# Patient Record
Sex: FEMALE | Race: WHITE | NOT HISPANIC OR LATINO
[De-identification: names, ages, dates, MRNs, and addresses within clinical notes are randomized per-mention and may not be internally consistent; named-entity substitution may affect disease eponyms.]

---

## 2017-01-05 ENCOUNTER — APPOINTMENT (OUTPATIENT)
Dept: DERMATOLOGY | Facility: CLINIC | Age: 57
End: 2017-01-05

## 2017-02-02 ENCOUNTER — MED ADMIN CHARGE (OUTPATIENT)
Age: 57
End: 2017-02-02

## 2017-02-15 ENCOUNTER — APPOINTMENT (OUTPATIENT)
Dept: INTERNAL MEDICINE | Facility: CLINIC | Age: 57
End: 2017-02-15

## 2017-02-15 VITALS
OXYGEN SATURATION: 96 % | HEIGHT: 66 IN | HEART RATE: 71 BPM | DIASTOLIC BLOOD PRESSURE: 80 MMHG | RESPIRATION RATE: 14 BRPM | SYSTOLIC BLOOD PRESSURE: 130 MMHG

## 2017-02-15 DIAGNOSIS — Z86.69 PERSONAL HISTORY OF OTHER DISEASES OF THE NERVOUS SYSTEM AND SENSE ORGANS: ICD-10-CM

## 2017-02-15 RX ORDER — METHYLPREDNISOLONE 4 MG/1
4 TABLET ORAL
Qty: 21 | Refills: 0 | Status: DISCONTINUED | COMMUNITY
Start: 2016-11-30

## 2017-02-15 RX ORDER — BIMATOPROST 0.3 MG/ML
0.03 SOLUTION/ DROPS OPHTHALMIC
Qty: 3 | Refills: 0 | Status: DISCONTINUED | COMMUNITY
Start: 2016-09-28

## 2017-02-15 RX ORDER — ERYTHROMYCIN 5 MG/G
5 OINTMENT OPHTHALMIC
Qty: 4 | Refills: 0 | Status: DISCONTINUED | COMMUNITY
Start: 2016-10-21

## 2017-02-18 ENCOUNTER — MEDICATION RENEWAL (OUTPATIENT)
Age: 57
End: 2017-02-18

## 2017-02-22 ENCOUNTER — RX RENEWAL (OUTPATIENT)
Age: 57
End: 2017-02-22

## 2017-03-13 ENCOUNTER — APPOINTMENT (OUTPATIENT)
Dept: INTERNAL MEDICINE | Facility: CLINIC | Age: 57
End: 2017-03-13

## 2017-03-13 VITALS — DIASTOLIC BLOOD PRESSURE: 80 MMHG | SYSTOLIC BLOOD PRESSURE: 132 MMHG

## 2017-03-13 VITALS
OXYGEN SATURATION: 98 % | TEMPERATURE: 97.8 F | DIASTOLIC BLOOD PRESSURE: 80 MMHG | HEIGHT: 66 IN | HEART RATE: 72 BPM | SYSTOLIC BLOOD PRESSURE: 140 MMHG | RESPIRATION RATE: 14 BRPM

## 2017-03-13 DIAGNOSIS — R50.9 FEVER, UNSPECIFIED: ICD-10-CM

## 2017-03-13 LAB
FLUAV SPEC QL CULT: NORMAL
FLUBV AG SPEC QL IA: NORMAL

## 2017-03-13 RX ORDER — AZITHROMYCIN 250 MG/1
250 TABLET, FILM COATED ORAL
Qty: 1 | Refills: 0 | Status: COMPLETED | COMMUNITY
Start: 2017-03-13 | End: 2017-03-18

## 2017-03-28 ENCOUNTER — RX RENEWAL (OUTPATIENT)
Age: 57
End: 2017-03-28

## 2017-04-06 ENCOUNTER — RX RENEWAL (OUTPATIENT)
Age: 57
End: 2017-04-06

## 2017-04-12 ENCOUNTER — RX RENEWAL (OUTPATIENT)
Age: 57
End: 2017-04-12

## 2017-04-22 ENCOUNTER — RX RENEWAL (OUTPATIENT)
Age: 57
End: 2017-04-22

## 2017-04-25 ENCOUNTER — APPOINTMENT (OUTPATIENT)
Dept: INTERNAL MEDICINE | Facility: CLINIC | Age: 57
End: 2017-04-25

## 2017-04-25 VITALS
HEIGHT: 66 IN | DIASTOLIC BLOOD PRESSURE: 60 MMHG | TEMPERATURE: 98.3 F | SYSTOLIC BLOOD PRESSURE: 110 MMHG | OXYGEN SATURATION: 96 % | RESPIRATION RATE: 14 BRPM | HEART RATE: 60 BPM

## 2017-04-25 DIAGNOSIS — H10.33 UNSPECIFIED ACUTE CONJUNCTIVITIS, BILATERAL: ICD-10-CM

## 2017-04-25 RX ORDER — NAPROXEN 500 MG/1
500 TABLET ORAL
Qty: 30 | Refills: 0 | Status: DISCONTINUED | COMMUNITY
Start: 2016-11-17

## 2017-04-25 RX ORDER — TIZANIDINE 4 MG/1
4 TABLET ORAL
Qty: 30 | Refills: 0 | Status: DISCONTINUED | COMMUNITY
Start: 2017-02-16

## 2017-04-25 RX ORDER — TOBRAMYCIN AND DEXAMETHASONE 3; 1 MG/ML; MG/ML
0.3-0.1 SUSPENSION/ DROPS OPHTHALMIC
Qty: 10 | Refills: 0 | Status: DISCONTINUED | COMMUNITY
Start: 2017-02-15

## 2017-04-25 RX ORDER — CYCLOBENZAPRINE HYDROCHLORIDE 5 MG/1
5 TABLET, FILM COATED ORAL
Qty: 30 | Refills: 0 | Status: DISCONTINUED | COMMUNITY
Start: 2016-11-30 | End: 2017-04-25

## 2017-04-25 RX ORDER — ALPRAZOLAM 0.25 MG/1
0.25 TABLET ORAL
Qty: 60 | Refills: 0 | Status: DISCONTINUED | COMMUNITY
Start: 2017-02-02

## 2017-04-25 RX ORDER — DIAZEPAM 10 MG/1
10 TABLET ORAL
Qty: 2 | Refills: 0 | Status: DISCONTINUED | COMMUNITY
Start: 2017-01-18

## 2017-05-04 ENCOUNTER — APPOINTMENT (OUTPATIENT)
Dept: CARDIOLOGY | Facility: CLINIC | Age: 57
End: 2017-05-04

## 2017-05-11 ENCOUNTER — APPOINTMENT (OUTPATIENT)
Dept: INTERNAL MEDICINE | Facility: CLINIC | Age: 57
End: 2017-05-11

## 2017-06-01 ENCOUNTER — APPOINTMENT (OUTPATIENT)
Dept: DERMATOLOGY | Facility: CLINIC | Age: 57
End: 2017-06-01

## 2017-06-12 ENCOUNTER — RX RENEWAL (OUTPATIENT)
Age: 57
End: 2017-06-12

## 2017-06-22 ENCOUNTER — APPOINTMENT (OUTPATIENT)
Dept: DERMATOLOGY | Facility: CLINIC | Age: 57
End: 2017-06-22

## 2017-06-22 DIAGNOSIS — L91.8 OTHER HYPERTROPHIC DISORDERS OF THE SKIN: ICD-10-CM

## 2017-06-22 DIAGNOSIS — L84 CORNS AND CALLOSITIES: ICD-10-CM

## 2017-06-22 DIAGNOSIS — D22.9 MELANOCYTIC NEVI, UNSPECIFIED: ICD-10-CM

## 2017-06-22 DIAGNOSIS — D23.30 OTHER BENIGN NEOPLASM OF SKIN OF UNSPECIFIED PART OF FACE: ICD-10-CM

## 2017-06-22 DIAGNOSIS — I78.1 NEVUS, NON-NEOPLASTIC: ICD-10-CM

## 2017-06-22 DIAGNOSIS — Z12.83 ENCOUNTER FOR SCREENING FOR MALIGNANT NEOPLASM OF SKIN: ICD-10-CM

## 2017-06-22 DIAGNOSIS — L82.1 OTHER SEBORRHEIC KERATOSIS: ICD-10-CM

## 2017-06-22 DIAGNOSIS — Z84.0 FAMILY HISTORY OF DISEASES OF THE SKIN AND SUBCUTANEOUS TISSUE: ICD-10-CM

## 2017-07-09 ENCOUNTER — RX RENEWAL (OUTPATIENT)
Age: 57
End: 2017-07-09

## 2017-07-27 ENCOUNTER — APPOINTMENT (OUTPATIENT)
Dept: DERMATOLOGY | Facility: CLINIC | Age: 57
End: 2017-07-27

## 2017-08-09 ENCOUNTER — RX RENEWAL (OUTPATIENT)
Age: 57
End: 2017-08-09

## 2017-08-21 ENCOUNTER — RX RENEWAL (OUTPATIENT)
Age: 57
End: 2017-08-21

## 2017-08-24 ENCOUNTER — APPOINTMENT (OUTPATIENT)
Dept: INTERNAL MEDICINE | Facility: CLINIC | Age: 57
End: 2017-08-24
Payer: COMMERCIAL

## 2017-08-24 ENCOUNTER — NON-APPOINTMENT (OUTPATIENT)
Age: 57
End: 2017-08-24

## 2017-08-24 VITALS
BODY MASS INDEX: 27.97 KG/M2 | WEIGHT: 174 LBS | SYSTOLIC BLOOD PRESSURE: 250 MMHG | HEART RATE: 62 BPM | OXYGEN SATURATION: 95 % | DIASTOLIC BLOOD PRESSURE: 76 MMHG | RESPIRATION RATE: 14 BRPM | HEIGHT: 66 IN

## 2017-08-24 VITALS — DIASTOLIC BLOOD PRESSURE: 70 MMHG | SYSTOLIC BLOOD PRESSURE: 116 MMHG

## 2017-08-24 PROCEDURE — 93000 ELECTROCARDIOGRAM COMPLETE: CPT

## 2017-08-24 PROCEDURE — 36415 COLL VENOUS BLD VENIPUNCTURE: CPT

## 2017-08-24 PROCEDURE — 99396 PREV VISIT EST AGE 40-64: CPT | Mod: 25

## 2017-08-24 RX ORDER — CLOTRIMAZOLE AND BETAMETHASONE DIPROPIONATE 10; .5 MG/G; MG/G
1-0.05 CREAM TOPICAL
Qty: 15 | Refills: 0 | Status: DISCONTINUED | COMMUNITY
Start: 2017-05-08

## 2017-08-24 RX ORDER — TERCONAZOLE 8 MG/G
0.8 CREAM VAGINAL
Qty: 20 | Refills: 0 | Status: DISCONTINUED | COMMUNITY
Start: 2017-05-08

## 2017-08-24 RX ORDER — TOBRAMYCIN 3 MG/ML
0.3 SOLUTION/ DROPS OPHTHALMIC EVERY 4 HOURS
Qty: 5 | Refills: 0 | Status: DISCONTINUED | COMMUNITY
Start: 2017-04-25 | End: 2017-08-24

## 2017-08-24 RX ORDER — AMOXICILLIN AND CLAVULANATE POTASSIUM 875; 125 MG/1; MG/1
875-125 TABLET, COATED ORAL
Qty: 20 | Refills: 0 | Status: DISCONTINUED | COMMUNITY
Start: 2017-04-25 | End: 2017-08-24

## 2017-08-28 LAB
25(OH)D3 SERPL-MCNC: 35.8 NG/ML
ALBUMIN SERPL ELPH-MCNC: 4.7 G/DL
ALP BLD-CCNC: 77 U/L
ALT SERPL-CCNC: 23 U/L
ANION GAP SERPL CALC-SCNC: 19 MMOL/L
APPEARANCE: CLEAR
AST SERPL-CCNC: 17 U/L
BASOPHILS # BLD AUTO: 0.06 K/UL
BASOPHILS NFR BLD AUTO: 0.6 %
BILIRUB SERPL-MCNC: 0.2 MG/DL
BILIRUBIN URINE: NEGATIVE
BLOOD URINE: NEGATIVE
BUN SERPL-MCNC: 25 MG/DL
CALCIUM SERPL-MCNC: 10.2 MG/DL
CHLORIDE SERPL-SCNC: 104 MMOL/L
CHOLEST SERPL-MCNC: 180 MG/DL
CHOLEST/HDLC SERPL: 5.5 RATIO
CO2 SERPL-SCNC: 25 MMOL/L
COLOR: YELLOW
CREAT SERPL-MCNC: 0.65 MG/DL
EOSINOPHIL # BLD AUTO: 0.19 K/UL
EOSINOPHIL NFR BLD AUTO: 1.9 %
ESTIMATED AVERAGE GLUCOSE: 134 MG/DL
FOLATE SERPL-MCNC: 11.6 NG/ML
GLUCOSE QUALITATIVE U: NORMAL MG/DL
GLUCOSE SERPL-MCNC: 136 MG/DL
HBA1C MFR BLD HPLC: 6.3 %
HCT VFR BLD CALC: 41.4 %
HDLC SERPL-MCNC: 33 MG/DL
HEMOCCULT STL QL IA: NEGATIVE
HGB BLD-MCNC: 12.9 G/DL
IMM GRANULOCYTES NFR BLD AUTO: 0.2 %
KETONES URINE: NEGATIVE
LDLC SERPL CALC-MCNC: 74 MG/DL
LEUKOCYTE ESTERASE URINE: NEGATIVE
LYMPHOCYTES # BLD AUTO: 2.58 K/UL
LYMPHOCYTES NFR BLD AUTO: 25.8 %
MAN DIFF?: NORMAL
MCHC RBC-ENTMCNC: 26.5 PG
MCHC RBC-ENTMCNC: 31.2 GM/DL
MCV RBC AUTO: 85.2 FL
MONOCYTES # BLD AUTO: 0.88 K/UL
MONOCYTES NFR BLD AUTO: 8.8 %
NEUTROPHILS # BLD AUTO: 6.26 K/UL
NEUTROPHILS NFR BLD AUTO: 62.7 %
NITRITE URINE: NEGATIVE
PH URINE: 5
PLATELET # BLD AUTO: 333 K/UL
POTASSIUM SERPL-SCNC: 4.5 MMOL/L
PROT SERPL-MCNC: 7.9 G/DL
PROTEIN URINE: NEGATIVE MG/DL
RBC # BLD: 4.86 M/UL
RBC # FLD: 13.8 %
SODIUM SERPL-SCNC: 148 MMOL/L
SPECIFIC GRAVITY URINE: 1.02
TRIGL SERPL-MCNC: 364 MG/DL
TSH SERPL-ACNC: 0.87 UIU/ML
UROBILINOGEN URINE: NORMAL MG/DL
VIT B12 SERPL-MCNC: 677 PG/ML
WBC # FLD AUTO: 9.99 K/UL

## 2017-09-04 ENCOUNTER — RX RENEWAL (OUTPATIENT)
Age: 57
End: 2017-09-04

## 2017-09-05 ENCOUNTER — RX RENEWAL (OUTPATIENT)
Age: 57
End: 2017-09-05

## 2017-09-11 ENCOUNTER — RX RENEWAL (OUTPATIENT)
Age: 57
End: 2017-09-11

## 2017-09-20 ENCOUNTER — APPOINTMENT (OUTPATIENT)
Dept: INTERNAL MEDICINE | Facility: CLINIC | Age: 57
End: 2017-09-20
Payer: COMMERCIAL

## 2017-09-20 VITALS
SYSTOLIC BLOOD PRESSURE: 140 MMHG | HEART RATE: 68 BPM | OXYGEN SATURATION: 95 % | TEMPERATURE: 98.5 F | DIASTOLIC BLOOD PRESSURE: 80 MMHG | HEIGHT: 66 IN | RESPIRATION RATE: 14 BRPM

## 2017-09-20 PROCEDURE — 99214 OFFICE O/P EST MOD 30 MIN: CPT

## 2017-10-16 ENCOUNTER — RX RENEWAL (OUTPATIENT)
Age: 57
End: 2017-10-16

## 2017-11-06 ENCOUNTER — RX RENEWAL (OUTPATIENT)
Age: 57
End: 2017-11-06

## 2017-11-08 ENCOUNTER — APPOINTMENT (OUTPATIENT)
Dept: INTERNAL MEDICINE | Facility: CLINIC | Age: 57
End: 2017-11-08
Payer: COMMERCIAL

## 2017-11-08 VITALS
HEIGHT: 66 IN | OXYGEN SATURATION: 95 % | SYSTOLIC BLOOD PRESSURE: 140 MMHG | HEART RATE: 100 BPM | RESPIRATION RATE: 14 BRPM | DIASTOLIC BLOOD PRESSURE: 80 MMHG | TEMPERATURE: 99.6 F

## 2017-11-08 DIAGNOSIS — J06.9 ACUTE UPPER RESPIRATORY INFECTION, UNSPECIFIED: ICD-10-CM

## 2017-11-08 DIAGNOSIS — R21 RASH AND OTHER NONSPECIFIC SKIN ERUPTION: ICD-10-CM

## 2017-11-08 LAB
FLUAV SPEC QL CULT: NORMAL
FLUBV AG SPEC QL IA: NORMAL

## 2017-11-08 PROCEDURE — 87804 INFLUENZA ASSAY W/OPTIC: CPT | Mod: QW

## 2017-11-08 PROCEDURE — 99214 OFFICE O/P EST MOD 30 MIN: CPT

## 2017-11-08 RX ORDER — AMOXICILLIN AND CLAVULANATE POTASSIUM 875; 125 MG/1; MG/1
875-125 TABLET, COATED ORAL
Qty: 20 | Refills: 0 | Status: COMPLETED | COMMUNITY
Start: 2017-09-20 | End: 2017-11-08

## 2017-11-08 RX ORDER — NYSTATIN 100000 [USP'U]/G
100000 CREAM TOPICAL TWICE DAILY
Qty: 1 | Refills: 0 | Status: ACTIVE | COMMUNITY
Start: 2017-11-08 | End: 1900-01-01

## 2017-11-09 ENCOUNTER — RX RENEWAL (OUTPATIENT)
Age: 57
End: 2017-11-09

## 2017-11-29 ENCOUNTER — APPOINTMENT (OUTPATIENT)
Dept: INTERNAL MEDICINE | Facility: CLINIC | Age: 57
End: 2017-11-29

## 2017-12-11 ENCOUNTER — APPOINTMENT (OUTPATIENT)
Dept: INTERNAL MEDICINE | Facility: CLINIC | Age: 57
End: 2017-12-11

## 2017-12-11 ENCOUNTER — RX RENEWAL (OUTPATIENT)
Age: 57
End: 2017-12-11

## 2017-12-14 ENCOUNTER — RX RENEWAL (OUTPATIENT)
Age: 57
End: 2017-12-14

## 2018-01-03 ENCOUNTER — NON-APPOINTMENT (OUTPATIENT)
Age: 58
End: 2018-01-03

## 2018-01-03 ENCOUNTER — APPOINTMENT (OUTPATIENT)
Dept: INTERNAL MEDICINE | Facility: CLINIC | Age: 58
End: 2018-01-03
Payer: COMMERCIAL

## 2018-01-03 VITALS
RESPIRATION RATE: 14 BRPM | TEMPERATURE: 98.4 F | SYSTOLIC BLOOD PRESSURE: 126 MMHG | WEIGHT: 175 LBS | DIASTOLIC BLOOD PRESSURE: 88 MMHG | HEIGHT: 66 IN | HEART RATE: 62 BPM | OXYGEN SATURATION: 99 % | BODY MASS INDEX: 28.12 KG/M2

## 2018-01-03 VITALS — DIASTOLIC BLOOD PRESSURE: 84 MMHG | SYSTOLIC BLOOD PRESSURE: 124 MMHG

## 2018-01-03 DIAGNOSIS — Z01.818 ENCOUNTER FOR OTHER PREPROCEDURAL EXAMINATION: ICD-10-CM

## 2018-01-03 PROCEDURE — 93000 ELECTROCARDIOGRAM COMPLETE: CPT

## 2018-01-03 PROCEDURE — 36415 COLL VENOUS BLD VENIPUNCTURE: CPT

## 2018-01-03 PROCEDURE — 99214 OFFICE O/P EST MOD 30 MIN: CPT | Mod: 25

## 2018-01-04 ENCOUNTER — APPOINTMENT (OUTPATIENT)
Dept: DERMATOLOGY | Facility: CLINIC | Age: 58
End: 2018-01-04

## 2018-01-04 LAB
BASOPHILS # BLD AUTO: 0.05 K/UL
BASOPHILS NFR BLD AUTO: 0.5 %
EOSINOPHIL # BLD AUTO: 0.18 K/UL
EOSINOPHIL NFR BLD AUTO: 1.7 %
HCT VFR BLD CALC: 42.9 %
HGB BLD-MCNC: 13.6 G/DL
IMM GRANULOCYTES NFR BLD AUTO: 0.3 %
LYMPHOCYTES # BLD AUTO: 2.69 K/UL
LYMPHOCYTES NFR BLD AUTO: 25.8 %
MAN DIFF?: NORMAL
MCHC RBC-ENTMCNC: 27.2 PG
MCHC RBC-ENTMCNC: 31.7 GM/DL
MCV RBC AUTO: 85.8 FL
MONOCYTES # BLD AUTO: 0.83 K/UL
MONOCYTES NFR BLD AUTO: 8 %
NEUTROPHILS # BLD AUTO: 6.66 K/UL
NEUTROPHILS NFR BLD AUTO: 63.7 %
PLATELET # BLD AUTO: 313 K/UL
RBC # BLD: 5 M/UL
RBC # FLD: 14.6 %
WBC # FLD AUTO: 10.44 K/UL

## 2018-01-05 LAB
ALBUMIN SERPL ELPH-MCNC: 4.5 G/DL
ALP BLD-CCNC: 79 U/L
ALT SERPL-CCNC: 33 U/L
ANION GAP SERPL CALC-SCNC: 17 MMOL/L
AST SERPL-CCNC: 27 U/L
BILIRUB SERPL-MCNC: 0.3 MG/DL
BUN SERPL-MCNC: 20 MG/DL
CALCIUM SERPL-MCNC: 10.1 MG/DL
CHLORIDE SERPL-SCNC: 99 MMOL/L
CO2 SERPL-SCNC: 27 MMOL/L
CREAT SERPL-MCNC: 0.71 MG/DL
GLUCOSE SERPL-MCNC: 127 MG/DL
HBA1C MFR BLD HPLC: 6.7 %
POTASSIUM SERPL-SCNC: 4.3 MMOL/L
PROT SERPL-MCNC: 7.8 G/DL
SODIUM SERPL-SCNC: 143 MMOL/L

## 2018-01-09 ENCOUNTER — RX RENEWAL (OUTPATIENT)
Age: 58
End: 2018-01-09

## 2018-01-11 ENCOUNTER — RESULT REVIEW (OUTPATIENT)
Age: 58
End: 2018-01-11

## 2018-01-20 ENCOUNTER — RX RENEWAL (OUTPATIENT)
Age: 58
End: 2018-01-20

## 2018-01-23 ENCOUNTER — RX RENEWAL (OUTPATIENT)
Age: 58
End: 2018-01-23

## 2018-02-09 ENCOUNTER — RX RENEWAL (OUTPATIENT)
Age: 58
End: 2018-02-09

## 2018-02-12 ENCOUNTER — OTHER (OUTPATIENT)
Age: 58
End: 2018-02-12

## 2018-02-12 ENCOUNTER — RX RENEWAL (OUTPATIENT)
Age: 58
End: 2018-02-12

## 2018-02-12 ENCOUNTER — MEDICATION RENEWAL (OUTPATIENT)
Age: 58
End: 2018-02-12

## 2018-03-13 ENCOUNTER — RX RENEWAL (OUTPATIENT)
Age: 58
End: 2018-03-13

## 2018-03-21 ENCOUNTER — APPOINTMENT (OUTPATIENT)
Dept: CHRONIC DISEASE MANAGEMENT | Facility: CLINIC | Age: 58
End: 2018-03-21

## 2018-04-11 ENCOUNTER — RX RENEWAL (OUTPATIENT)
Age: 58
End: 2018-04-11

## 2018-04-11 ENCOUNTER — APPOINTMENT (OUTPATIENT)
Dept: CHRONIC DISEASE MANAGEMENT | Facility: CLINIC | Age: 58
End: 2018-04-11

## 2018-04-13 ENCOUNTER — RX RENEWAL (OUTPATIENT)
Age: 58
End: 2018-04-13

## 2018-04-25 ENCOUNTER — APPOINTMENT (OUTPATIENT)
Dept: INTERNAL MEDICINE | Facility: CLINIC | Age: 58
End: 2018-04-25

## 2018-05-01 ENCOUNTER — APPOINTMENT (OUTPATIENT)
Dept: INTERNAL MEDICINE | Facility: CLINIC | Age: 58
End: 2018-05-01
Payer: COMMERCIAL

## 2018-05-01 VITALS
DIASTOLIC BLOOD PRESSURE: 88 MMHG | OXYGEN SATURATION: 95 % | WEIGHT: 180 LBS | SYSTOLIC BLOOD PRESSURE: 132 MMHG | HEIGHT: 66 IN | HEART RATE: 73 BPM | BODY MASS INDEX: 28.93 KG/M2 | RESPIRATION RATE: 14 BRPM | TEMPERATURE: 98.4 F

## 2018-05-01 VITALS — SYSTOLIC BLOOD PRESSURE: 120 MMHG | DIASTOLIC BLOOD PRESSURE: 80 MMHG

## 2018-05-01 PROCEDURE — 99214 OFFICE O/P EST MOD 30 MIN: CPT

## 2018-05-01 RX ORDER — OXYCODONE AND ACETAMINOPHEN 5; 325 MG/1; MG/1
5-325 TABLET ORAL
Qty: 2 | Refills: 0 | Status: DISCONTINUED | COMMUNITY
Start: 2017-11-24

## 2018-05-06 ENCOUNTER — RX RENEWAL (OUTPATIENT)
Age: 58
End: 2018-05-06

## 2018-05-11 ENCOUNTER — RX RENEWAL (OUTPATIENT)
Age: 58
End: 2018-05-11

## 2018-05-31 ENCOUNTER — RX RENEWAL (OUTPATIENT)
Age: 58
End: 2018-05-31

## 2018-06-15 ENCOUNTER — RX RENEWAL (OUTPATIENT)
Age: 58
End: 2018-06-15

## 2018-06-20 ENCOUNTER — LABORATORY RESULT (OUTPATIENT)
Age: 58
End: 2018-06-20

## 2018-06-21 ENCOUNTER — MOBILE ON CALL (OUTPATIENT)
Age: 58
End: 2018-06-21

## 2018-06-21 ENCOUNTER — APPOINTMENT (OUTPATIENT)
Dept: DERMATOLOGY | Facility: CLINIC | Age: 58
End: 2018-06-21
Payer: COMMERCIAL

## 2018-06-21 DIAGNOSIS — D48.5 NEOPLASM OF UNCERTAIN BEHAVIOR OF SKIN: ICD-10-CM

## 2018-06-21 DIAGNOSIS — L57.0 ACTINIC KERATOSIS: ICD-10-CM

## 2018-06-21 PROCEDURE — 99213 OFFICE O/P EST LOW 20 MIN: CPT | Mod: 25

## 2018-06-21 PROCEDURE — 11100 BX SKIN SUBCUTANEOUS&/MUCOUS MEMBRANE 1 LESION: CPT

## 2018-06-23 ENCOUNTER — INPATIENT (INPATIENT)
Facility: HOSPITAL | Age: 58
LOS: 2 days | Discharge: HOME | DRG: 440 | End: 2018-06-26
Attending: FAMILY MEDICINE | Admitting: HOSPITALIST
Payer: COMMERCIAL

## 2018-06-23 VITALS
HEIGHT: 66 IN | RESPIRATION RATE: 18 BRPM | WEIGHT: 184.97 LBS | SYSTOLIC BLOOD PRESSURE: 173 MMHG | TEMPERATURE: 98 F | HEART RATE: 76 BPM | DIASTOLIC BLOOD PRESSURE: 91 MMHG | OXYGEN SATURATION: 100 %

## 2018-06-23 DIAGNOSIS — K85.90 ACUTE PANCREATITIS WITHOUT NECROSIS OR INFECTION, UNSPECIFIED: ICD-10-CM

## 2018-06-23 DIAGNOSIS — E78.1 PURE HYPERGLYCERIDEMIA: ICD-10-CM

## 2018-06-23 DIAGNOSIS — I10 ESSENTIAL (PRIMARY) HYPERTENSION: ICD-10-CM

## 2018-06-23 DIAGNOSIS — E11.9 TYPE 2 DIABETES MELLITUS WITHOUT COMPLICATIONS: ICD-10-CM

## 2018-06-23 DIAGNOSIS — Z29.9 ENCOUNTER FOR PROPHYLACTIC MEASURES, UNSPECIFIED: ICD-10-CM

## 2018-06-23 DIAGNOSIS — Z86.018 PERSONAL HISTORY OF OTHER BENIGN NEOPLASM: Chronic | ICD-10-CM

## 2018-06-23 LAB
ALBUMIN SERPL ELPH-MCNC: 3.5 G/DL — SIGNIFICANT CHANGE UP (ref 3.3–5)
ALP SERPL-CCNC: 116 U/L — SIGNIFICANT CHANGE UP (ref 40–120)
ALT FLD-CCNC: 51 U/L — SIGNIFICANT CHANGE UP (ref 12–78)
ANION GAP SERPL CALC-SCNC: 7 MMOL/L — SIGNIFICANT CHANGE UP (ref 5–17)
APPEARANCE UR: CLEAR — SIGNIFICANT CHANGE UP
APTT BLD: 28.1 SEC — SIGNIFICANT CHANGE UP (ref 27.5–37.4)
AST SERPL-CCNC: 58 U/L — SIGNIFICANT CHANGE UP (ref 15–37)
BACTERIA # UR AUTO: NEGATIVE — SIGNIFICANT CHANGE UP
BASOPHILS # BLD AUTO: 0.1 K/UL — SIGNIFICANT CHANGE UP (ref 0–0.2)
BASOPHILS NFR BLD AUTO: 0.5 % — SIGNIFICANT CHANGE UP (ref 0–2)
BILIRUB SERPL-MCNC: 0.9 MG/DL — SIGNIFICANT CHANGE UP (ref 0.2–1.2)
BILIRUB UR-MCNC: NEGATIVE — SIGNIFICANT CHANGE UP
BUN SERPL-MCNC: 18 MG/DL — SIGNIFICANT CHANGE UP (ref 7–23)
CALCIUM SERPL-MCNC: SIGNIFICANT CHANGE UP MG/DL (ref 8.5–10.1)
CHLORIDE SERPL-SCNC: 100 MMOL/L — SIGNIFICANT CHANGE UP (ref 96–108)
CO2 SERPL-SCNC: 28 MMOL/L — SIGNIFICANT CHANGE UP (ref 22–31)
COLOR SPEC: YELLOW — SIGNIFICANT CHANGE UP
CREAT SERPL-MCNC: 0.5 MG/DL — SIGNIFICANT CHANGE UP (ref 0.5–1.3)
DIFF PNL FLD: ABNORMAL
EOSINOPHIL # BLD AUTO: 0.22 K/UL — SIGNIFICANT CHANGE UP (ref 0–0.5)
EOSINOPHIL NFR BLD AUTO: 1.1 % — SIGNIFICANT CHANGE UP (ref 0–6)
EPI CELLS # UR: SIGNIFICANT CHANGE UP
GLUCOSE SERPL-MCNC: 247 MG/DL — HIGH (ref 70–99)
GLUCOSE UR QL: NEGATIVE — SIGNIFICANT CHANGE UP
HCT VFR BLD CALC: 39.2 % — SIGNIFICANT CHANGE UP (ref 34.5–45)
HGB BLD-MCNC: 14.4 G/DL — SIGNIFICANT CHANGE UP (ref 11.5–15.5)
IMM GRANULOCYTES NFR BLD AUTO: 0.7 % — SIGNIFICANT CHANGE UP (ref 0–1.5)
INR BLD: 1.07 RATIO — SIGNIFICANT CHANGE UP (ref 0.88–1.16)
KETONES UR-MCNC: ABNORMAL
LACTATE SERPL-SCNC: 0.6 MMOL/L — LOW (ref 0.7–2)
LEUKOCYTE ESTERASE UR-ACNC: NEGATIVE — SIGNIFICANT CHANGE UP
LIDOCAIN IGE QN: 2892 U/L — HIGH (ref 73–393)
LYMPHOCYTES # BLD AUTO: 1.87 K/UL — SIGNIFICANT CHANGE UP (ref 1–3.3)
LYMPHOCYTES # BLD AUTO: 9 % — LOW (ref 13–44)
MCHC RBC-ENTMCNC: 30.1 PG — SIGNIFICANT CHANGE UP (ref 27–34)
MCHC RBC-ENTMCNC: 36.7 GM/DL — HIGH (ref 32–36)
MCV RBC AUTO: 82 FL — SIGNIFICANT CHANGE UP (ref 80–100)
MONOCYTES # BLD AUTO: 1.23 K/UL — HIGH (ref 0–0.9)
MONOCYTES NFR BLD AUTO: 5.9 % — SIGNIFICANT CHANGE UP (ref 2–14)
NEUTROPHILS # BLD AUTO: 17.22 K/UL — HIGH (ref 1.8–7.4)
NEUTROPHILS NFR BLD AUTO: 82.8 % — HIGH (ref 43–77)
NITRITE UR-MCNC: NEGATIVE — SIGNIFICANT CHANGE UP
NRBC # BLD: 0 /100 WBCS — SIGNIFICANT CHANGE UP (ref 0–0)
PH UR: 7 — SIGNIFICANT CHANGE UP (ref 5–8)
PLATELET # BLD AUTO: 317 K/UL — SIGNIFICANT CHANGE UP (ref 150–400)
POTASSIUM SERPL-MCNC: 4.4 MMOL/L — SIGNIFICANT CHANGE UP (ref 3.5–5.3)
POTASSIUM SERPL-SCNC: 4.4 MMOL/L — SIGNIFICANT CHANGE UP (ref 3.5–5.3)
PROT SERPL-MCNC: 7 G/DL — SIGNIFICANT CHANGE UP (ref 6–8.3)
PROT UR-MCNC: 25 MG/DL
PROTHROM AB SERPL-ACNC: 11.7 SEC — SIGNIFICANT CHANGE UP (ref 9.8–12.7)
RBC # BLD: 4.78 M/UL — SIGNIFICANT CHANGE UP (ref 3.8–5.2)
RBC # FLD: 14.5 % — SIGNIFICANT CHANGE UP (ref 10.3–14.5)
RBC CASTS # UR COMP ASSIST: SIGNIFICANT CHANGE UP /HPF (ref 0–4)
SODIUM SERPL-SCNC: 135 MMOL/L — SIGNIFICANT CHANGE UP (ref 135–145)
SP GR SPEC: 1.01 — SIGNIFICANT CHANGE UP (ref 1.01–1.02)
UROBILINOGEN FLD QL: NEGATIVE — SIGNIFICANT CHANGE UP
WBC # BLD: 20.79 K/UL — HIGH (ref 3.8–10.5)
WBC # FLD AUTO: 20.79 K/UL — HIGH (ref 3.8–10.5)
WBC UR QL: SIGNIFICANT CHANGE UP

## 2018-06-23 PROCEDURE — 99285 EMERGENCY DEPT VISIT HI MDM: CPT

## 2018-06-23 PROCEDURE — 99223 1ST HOSP IP/OBS HIGH 75: CPT | Mod: AI

## 2018-06-23 PROCEDURE — 71046 X-RAY EXAM CHEST 2 VIEWS: CPT | Mod: 26

## 2018-06-23 PROCEDURE — 76700 US EXAM ABDOM COMPLETE: CPT | Mod: 26

## 2018-06-23 RX ORDER — DEXTROSE 50 % IN WATER 50 %
12.5 SYRINGE (ML) INTRAVENOUS ONCE
Qty: 0 | Refills: 0 | Status: DISCONTINUED | OUTPATIENT
Start: 2018-06-23 | End: 2018-06-26

## 2018-06-23 RX ORDER — SODIUM CHLORIDE 9 MG/ML
1000 INJECTION, SOLUTION INTRAVENOUS
Qty: 0 | Refills: 0 | Status: DISCONTINUED | OUTPATIENT
Start: 2018-06-23 | End: 2018-06-24

## 2018-06-23 RX ORDER — FAMOTIDINE 10 MG/ML
20 INJECTION INTRAVENOUS
Qty: 0 | Refills: 0 | Status: DISCONTINUED | OUTPATIENT
Start: 2018-06-23 | End: 2018-06-23

## 2018-06-23 RX ORDER — ALPRAZOLAM 0.25 MG
0.5 TABLET ORAL
Qty: 0 | Refills: 0 | Status: DISCONTINUED | OUTPATIENT
Start: 2018-06-23 | End: 2018-06-26

## 2018-06-23 RX ORDER — INSULIN LISPRO 100/ML
VIAL (ML) SUBCUTANEOUS
Qty: 0 | Refills: 0 | Status: DISCONTINUED | OUTPATIENT
Start: 2018-06-23 | End: 2018-06-26

## 2018-06-23 RX ORDER — MORPHINE SULFATE 50 MG/1
4 CAPSULE, EXTENDED RELEASE ORAL ONCE
Qty: 0 | Refills: 0 | Status: DISCONTINUED | OUTPATIENT
Start: 2018-06-23 | End: 2018-06-23

## 2018-06-23 RX ORDER — HYDROMORPHONE HYDROCHLORIDE 2 MG/ML
0.5 INJECTION INTRAMUSCULAR; INTRAVENOUS; SUBCUTANEOUS ONCE
Qty: 0 | Refills: 0 | Status: DISCONTINUED | OUTPATIENT
Start: 2018-06-23 | End: 2018-06-23

## 2018-06-23 RX ORDER — FENOFIBRATE,MICRONIZED 130 MG
145 CAPSULE ORAL DAILY
Qty: 0 | Refills: 0 | Status: DISCONTINUED | OUTPATIENT
Start: 2018-06-23 | End: 2018-06-24

## 2018-06-23 RX ORDER — DEXTROSE 50 % IN WATER 50 %
15 SYRINGE (ML) INTRAVENOUS ONCE
Qty: 0 | Refills: 0 | Status: DISCONTINUED | OUTPATIENT
Start: 2018-06-23 | End: 2018-06-26

## 2018-06-23 RX ORDER — ONDANSETRON 8 MG/1
4 TABLET, FILM COATED ORAL EVERY 6 HOURS
Qty: 0 | Refills: 0 | Status: DISCONTINUED | OUTPATIENT
Start: 2018-06-23 | End: 2018-06-26

## 2018-06-23 RX ORDER — SODIUM CHLORIDE 9 MG/ML
1000 INJECTION, SOLUTION INTRAVENOUS
Qty: 0 | Refills: 0 | Status: DISCONTINUED | OUTPATIENT
Start: 2018-06-23 | End: 2018-06-26

## 2018-06-23 RX ORDER — SODIUM CHLORIDE 9 MG/ML
1000 INJECTION INTRAMUSCULAR; INTRAVENOUS; SUBCUTANEOUS
Qty: 0 | Refills: 0 | Status: DISCONTINUED | OUTPATIENT
Start: 2018-06-23 | End: 2018-06-23

## 2018-06-23 RX ORDER — ATENOLOL 25 MG/1
25 TABLET ORAL DAILY
Qty: 0 | Refills: 0 | Status: DISCONTINUED | OUTPATIENT
Start: 2018-06-23 | End: 2018-06-26

## 2018-06-23 RX ORDER — SODIUM CHLORIDE 9 MG/ML
3 INJECTION INTRAMUSCULAR; INTRAVENOUS; SUBCUTANEOUS ONCE
Qty: 0 | Refills: 0 | Status: COMPLETED | OUTPATIENT
Start: 2018-06-23 | End: 2018-06-23

## 2018-06-23 RX ORDER — FAMOTIDINE 10 MG/ML
20 INJECTION INTRAVENOUS
Qty: 0 | Refills: 0 | Status: DISCONTINUED | OUTPATIENT
Start: 2018-06-23 | End: 2018-06-26

## 2018-06-23 RX ORDER — METOCLOPRAMIDE HCL 10 MG
10 TABLET ORAL EVERY 6 HOURS
Qty: 0 | Refills: 0 | Status: DISCONTINUED | OUTPATIENT
Start: 2018-06-23 | End: 2018-06-26

## 2018-06-23 RX ORDER — MORPHINE SULFATE 50 MG/1
2 CAPSULE, EXTENDED RELEASE ORAL EVERY 6 HOURS
Qty: 0 | Refills: 0 | Status: DISCONTINUED | OUTPATIENT
Start: 2018-06-23 | End: 2018-06-26

## 2018-06-23 RX ORDER — ONDANSETRON 8 MG/1
4 TABLET, FILM COATED ORAL ONCE
Qty: 0 | Refills: 0 | Status: COMPLETED | OUTPATIENT
Start: 2018-06-23 | End: 2018-06-23

## 2018-06-23 RX ORDER — IOHEXOL 300 MG/ML
30 INJECTION, SOLUTION INTRAVENOUS ONCE
Qty: 0 | Refills: 0 | Status: COMPLETED | OUTPATIENT
Start: 2018-06-23 | End: 2018-06-23

## 2018-06-23 RX ORDER — NOREPINEPHRINE BITARTRATE/D5W 8 MG/250ML
0.05 PLASTIC BAG, INJECTION (ML) INTRAVENOUS
Qty: 8 | Refills: 0 | Status: DISCONTINUED | OUTPATIENT
Start: 2018-06-23 | End: 2018-06-23

## 2018-06-23 RX ORDER — GLUCAGON INJECTION, SOLUTION 0.5 MG/.1ML
1 INJECTION, SOLUTION SUBCUTANEOUS ONCE
Qty: 0 | Refills: 0 | Status: DISCONTINUED | OUTPATIENT
Start: 2018-06-23 | End: 2018-06-26

## 2018-06-23 RX ORDER — SODIUM CHLORIDE 9 MG/ML
1000 INJECTION INTRAMUSCULAR; INTRAVENOUS; SUBCUTANEOUS ONCE
Qty: 0 | Refills: 0 | Status: COMPLETED | OUTPATIENT
Start: 2018-06-23 | End: 2018-06-23

## 2018-06-23 RX ADMIN — MORPHINE SULFATE 4 MILLIGRAM(S): 50 CAPSULE, EXTENDED RELEASE ORAL at 20:35

## 2018-06-23 RX ADMIN — MORPHINE SULFATE 2 MILLIGRAM(S): 50 CAPSULE, EXTENDED RELEASE ORAL at 23:03

## 2018-06-23 RX ADMIN — SODIUM CHLORIDE 125 MILLILITER(S): 9 INJECTION INTRAMUSCULAR; INTRAVENOUS; SUBCUTANEOUS at 21:42

## 2018-06-23 RX ADMIN — HYDROMORPHONE HYDROCHLORIDE 0.5 MILLIGRAM(S): 2 INJECTION INTRAMUSCULAR; INTRAVENOUS; SUBCUTANEOUS at 22:05

## 2018-06-23 RX ADMIN — MORPHINE SULFATE 2 MILLIGRAM(S): 50 CAPSULE, EXTENDED RELEASE ORAL at 23:20

## 2018-06-23 RX ADMIN — SODIUM CHLORIDE 3 MILLILITER(S): 9 INJECTION INTRAMUSCULAR; INTRAVENOUS; SUBCUTANEOUS at 20:10

## 2018-06-23 RX ADMIN — SODIUM CHLORIDE 1000 MILLILITER(S): 9 INJECTION INTRAMUSCULAR; INTRAVENOUS; SUBCUTANEOUS at 21:10

## 2018-06-23 RX ADMIN — HYDROMORPHONE HYDROCHLORIDE 0.5 MILLIGRAM(S): 2 INJECTION INTRAMUSCULAR; INTRAVENOUS; SUBCUTANEOUS at 21:48

## 2018-06-23 RX ADMIN — MORPHINE SULFATE 4 MILLIGRAM(S): 50 CAPSULE, EXTENDED RELEASE ORAL at 20:16

## 2018-06-23 RX ADMIN — ONDANSETRON 4 MILLIGRAM(S): 8 TABLET, FILM COATED ORAL at 20:16

## 2018-06-23 RX ADMIN — IOHEXOL 30 MILLILITER(S): 300 INJECTION, SOLUTION INTRAVENOUS at 21:43

## 2018-06-23 RX ADMIN — SODIUM CHLORIDE 150 MILLILITER(S): 9 INJECTION, SOLUTION INTRAVENOUS at 23:04

## 2018-06-23 RX ADMIN — Medication 10 MILLIGRAM(S): at 23:02

## 2018-06-23 RX ADMIN — SODIUM CHLORIDE 1000 MILLILITER(S): 9 INJECTION INTRAMUSCULAR; INTRAVENOUS; SUBCUTANEOUS at 20:15

## 2018-06-23 NOTE — H&P ADULT - NSHPSOCIALHISTORY_GEN_ALL_CORE
Former smoker, 3PPD X 15 years> 45 pack years. Quit 1990  Denies alcohol or drug use  , lives with   Works as

## 2018-06-23 NOTE — ED ADULT TRIAGE NOTE - CHIEF COMPLAINT QUOTE
Patient complaining of diffuse abdominal pain started this morning with nausea and vomiting scheduled for colonoscopy and endoscopy on Tuesday went to urgent care this afternoon and was prescribed prilosec but wasn't able to tolerate and she vomited the medication

## 2018-06-23 NOTE — ED PROVIDER NOTE - OBJECTIVE STATEMENT
pt is a 58 yo f whose pmd is dr Edwards/Perla gi is dr Rosa. she is scheduled for egd colonoscopy next week for recurrent abd pain. she pt is a 58 yo f whose pmd is dr Edwards/Perla gi is dr Rosa. she is scheduled for egd colonoscopy next week for recurrent abd pain. she has hx of pancreatitis, diabetes, htn sp fibroid surgery, no allergeis former smoker  developed epigastric abs pain today. she went ot urgent care was given a ppi but vomited it up. so she came here  pain is a "dull ache"

## 2018-06-23 NOTE — H&P ADULT - PROBLEM SELECTOR PLAN 5
Padua score for prediction VTE 2 (low) will implement SCDs and contemplate SQ LMW heparin based on clinical course  is NPO, will institute GI PPx with Protonix Padua score for prediction VTE 2 (low) will implement SCDs and contemplate SQ LMW heparin based on clinical course  is NPO, will institute GI PPx with pepcid (protonix with SE pancreatitis)

## 2018-06-23 NOTE — ED ADULT NURSE NOTE - OBJECTIVE STATEMENT
Patient complaining of diffuse burning abdominal pain starting this am with nausea and emesis x 2 noted. Pt. stated she was seen in urgent care and prescribed Prilosec but was unable to tolerate. Pt. stated she is scheduled for a colonoscopy and endoscopy on Tuesday. Denied fevers or diarrhea.

## 2018-06-23 NOTE — ED ADULT NURSE REASSESSMENT NOTE - NS ED NURSE REASSESS COMMENT FT1
Pt. preparing for CT scan, reported multiple episodes of emesis with increase in pain. Dr. Reyes made aware. Reglan and morphine given per MD order.

## 2018-06-23 NOTE — H&P ADULT - NSHPLABSRESULTS_GEN_ALL_CORE
14.4   20.79 )-----------( 317      ( 2018 20:18 )             39.2           135  |  100  |  18  ----------------------------<  247<H>  4.4   |  28  |  0.50    Ca    see comment specimen to lipemic for testing      2018 20:18    TPro  x   /  Alb  3.5  /  TBili  0.9  /  DBili  x   /  AST  58  /  ALT  x   /  AlkPhos  116                Urinalysis Basic - ( 2018 20:18 )    Color: Yellow / Appearance: Clear / S.015 / pH: x  Gluc: x / Ketone: Trace  / Bili: Negative / Urobili: Negative   Blood: x / Protein: 25 mg/dL / Nitrite: Negative   Leuk Esterase: Negative / RBC: 0-2 /HPF / WBC 0-2   Sq Epi: x / Non Sq Epi: Occasional / Bacteria: Negative        PT/INR - ( 2018 20:18 )   PT: 11.7 sec;   INR: 1.07 ratio         PTT - ( 2018 20:18 )  PTT:28.1 sec    Lactate Trend   @ 20:18 Lactate:0.6             CAPILLARY BLOOD GLUCOSE            US GB and CT ABD pending

## 2018-06-23 NOTE — H&P ADULT - ATTENDING COMMENTS
Seen and examined by attending MD. Case discussed with patient who is alert and oriented , and voiced understanding and agreement to aforementioned plan.

## 2018-06-23 NOTE — H&P ADULT - NSHPPHYSICALEXAM_GEN_ALL_CORE
General: Well developed, well nourished, NAD  HEENT: NCAT, PERRLA, EOMI bl, moist mucous membranes   Neck: Supple, nontender, no mass  Neurology: A&Ox3,, CN II-XII grossly intact, sensation intact,   Respiratory: CTA B/L, No W/R/R  CV: RRR, +S1/S2, no murmurs, rubs or gallops  Abdominal: Soft,tender at RUQ with negative nicholson sign, neg R neg psoas and obturator, NABS. Rectal exam performed with ED TE chaperone Parvin. Small ext hemorrhoid, skin tag, normal rectal tone, no stool.   Extremities: No edema, Myron neg + peripheral pulses  MSK: Normal ROM, no joint erythema or warmth, no joint swelling   Skin: warm, dry, normal color, no rash or abnormal lesions

## 2018-06-23 NOTE — H&P ADULT - ASSESSMENT
56 YO F PMH Pancreatitis, hypertigyleridemia, DM2, HTN on indapamide admitted with abdominal pain. DDx pancreatitis.

## 2018-06-23 NOTE — H&P ADULT - PROBLEM SELECTOR PLAN 1
[place] II score 9, pending pH  Trial NPO, IVF changed to LR  needs stat CT, awaiting PO oral contrast trial  GI consult Dr Rosa  Attribute to medication (indapamide)? Also idiopathic, biliary (although US not indicative) [place] II score 9, pending pH  Trial NPO, IVF changed to LR  needs stat CT, awaiting PO oral contrast trial  GI consult Dr Rosa  Attribute to hypertiglyceridemia? Serum Ca unable to view due to grossly lipemic blood sample. Also consider DDx medication induced (indapamide)? Also idiopathic, biliary (although US not indicative)

## 2018-06-23 NOTE — H&P ADULT - NSHPREVIEWOFSYSTEMS_GEN_ALL_CORE
Constitutional: denies fever, chills, general malaise, weight loss, weight gain, diaphoresis   HEENT: denies dry mouth, sore throat, runny nose, photophobia, blurry vision, double vision, discharge, eye pain, difficulty hearing, vertigo, dysphagia, epistaxis, recent dental work    Respiratory: denies SOB, [place], cough, sputum production, wheezing, hemoptysis  Cardiovascular: denies CP, palpitations, edema  Gastrointestinal: HPI  Genitourinary: denies dysuria, frequency, urgency, incontinence, hematuria   Skin/Breast: denies rash, hives, itching, masses, hair loss   Musculoskeletal: denies myalgias, arthritis, joint swelling, muscle weakness  Neurologic: denies syncope, LOC, headache, weakness, dizziness, paresthesias, numbness, seizures, confusion, dementia   Psychiatric: denies feeling anxious, depressed, suicidal, homicidal thoughts  Endocrine: denies , cold or heat intolerance, polydipsia, polyuria, polyphagia   Hematology/Oncology: denies bruising, tender or enlarged lymph nodes   ROS negative except as noted above

## 2018-06-23 NOTE — H&P ADULT - HISTORY OF PRESENT ILLNESS
58 yo F PMH DM HTN Chronic pancreatitis hypertriglyceridemia, and anxiety, presents to ED by car with complaints of abdominal pain and nausea with emesis X 1 day. She describes that symptoms resemble prior bouts of pancreatitis. Her pain is located at the epigastrium radiating to RUQ and is dull, 7/10 and constant. There is no relation to position or food intake. She reports decreased PO intake over last 24 hours due tot he nausea, and has vomited 3 times this morning, mostly undigested food. She reports solid BM this morning, normal in color, consistency and caliber, however, she decribes blood on toilet paper yesterday 9 she also describes a hx hemorrhoids) She denies dysphagia hematemesis, chest pain dyspnea fever or chills.

## 2018-06-24 LAB
ALBUMIN SERPL ELPH-MCNC: 3.1 G/DL — LOW (ref 3.3–5)
ALP SERPL-CCNC: 93 U/L — SIGNIFICANT CHANGE UP (ref 40–120)
ALT FLD-CCNC: 45 U/L — SIGNIFICANT CHANGE UP (ref 12–78)
ANION GAP SERPL CALC-SCNC: 11 MMOL/L — SIGNIFICANT CHANGE UP (ref 5–17)
AST SERPL-CCNC: 24 U/L — SIGNIFICANT CHANGE UP (ref 15–37)
BASOPHILS # BLD AUTO: 0.09 K/UL — SIGNIFICANT CHANGE UP (ref 0–0.2)
BASOPHILS NFR BLD AUTO: 0.5 % — SIGNIFICANT CHANGE UP (ref 0–2)
BILIRUB SERPL-MCNC: 0.6 MG/DL — SIGNIFICANT CHANGE UP (ref 0.2–1.2)
BUN SERPL-MCNC: 11 MG/DL — SIGNIFICANT CHANGE UP (ref 7–23)
CALCIUM SERPL-MCNC: 8.5 MG/DL — SIGNIFICANT CHANGE UP (ref 8.5–10.1)
CHLORIDE SERPL-SCNC: 101 MMOL/L — SIGNIFICANT CHANGE UP (ref 96–108)
CHOLEST SERPL-MCNC: 371 MG/DL — HIGH (ref 10–199)
CO2 SERPL-SCNC: 25 MMOL/L — SIGNIFICANT CHANGE UP (ref 22–31)
CREAT SERPL-MCNC: 0.57 MG/DL — SIGNIFICANT CHANGE UP (ref 0.5–1.3)
EOSINOPHIL # BLD AUTO: 0.16 K/UL — SIGNIFICANT CHANGE UP (ref 0–0.5)
EOSINOPHIL NFR BLD AUTO: 0.8 % — SIGNIFICANT CHANGE UP (ref 0–6)
GLUCOSE SERPL-MCNC: 200 MG/DL — HIGH (ref 70–99)
HBA1C BLD-MCNC: 7.4 % — HIGH (ref 4–5.6)
HCT VFR BLD CALC: 37.8 % — SIGNIFICANT CHANGE UP (ref 34.5–45)
HDLC SERPL-MCNC: 20 MG/DL — LOW (ref 40–125)
HGB BLD-MCNC: 13.1 G/DL — SIGNIFICANT CHANGE UP (ref 11.5–15.5)
IMM GRANULOCYTES NFR BLD AUTO: 0.7 % — SIGNIFICANT CHANGE UP (ref 0–1.5)
LIDOCAIN IGE QN: 2346 U/L — HIGH (ref 73–393)
LIPID PNL WITH DIRECT LDL SERPL: SIGNIFICANT CHANGE UP
LYMPHOCYTES # BLD AUTO: 1.32 K/UL — SIGNIFICANT CHANGE UP (ref 1–3.3)
LYMPHOCYTES # BLD AUTO: 6.7 % — LOW (ref 13–44)
MCHC RBC-ENTMCNC: 28.2 PG — SIGNIFICANT CHANGE UP (ref 27–34)
MCHC RBC-ENTMCNC: 34.7 GM/DL — SIGNIFICANT CHANGE UP (ref 32–36)
MCV RBC AUTO: 81.3 FL — SIGNIFICANT CHANGE UP (ref 80–100)
MONOCYTES # BLD AUTO: 1.48 K/UL — HIGH (ref 0–0.9)
MONOCYTES NFR BLD AUTO: 7.5 % — SIGNIFICANT CHANGE UP (ref 2–14)
NEUTROPHILS # BLD AUTO: 16.62 K/UL — HIGH (ref 1.8–7.4)
NEUTROPHILS NFR BLD AUTO: 83.8 % — HIGH (ref 43–77)
PLATELET # BLD AUTO: 263 K/UL — SIGNIFICANT CHANGE UP (ref 150–400)
POTASSIUM SERPL-MCNC: 3.6 MMOL/L — SIGNIFICANT CHANGE UP (ref 3.5–5.3)
POTASSIUM SERPL-SCNC: 3.6 MMOL/L — SIGNIFICANT CHANGE UP (ref 3.5–5.3)
PROT SERPL-MCNC: 6.8 G/DL — SIGNIFICANT CHANGE UP (ref 6–8.3)
RBC # BLD: 4.65 M/UL — SIGNIFICANT CHANGE UP (ref 3.8–5.2)
RBC # FLD: 14.2 % — SIGNIFICANT CHANGE UP (ref 10.3–14.5)
SODIUM SERPL-SCNC: 137 MMOL/L — SIGNIFICANT CHANGE UP (ref 135–145)
TOTAL CHOLESTEROL/HDL RATIO MEASUREMENT: 18.6 RATIO — HIGH (ref 3.3–7.1)
TRIGL SERPL-MCNC: 1650 MG/DL — HIGH (ref 10–149)
WBC # BLD: 19.8 K/UL — HIGH (ref 3.8–10.5)
WBC # FLD AUTO: 19.8 K/UL — HIGH (ref 3.8–10.5)

## 2018-06-24 PROCEDURE — 93010 ELECTROCARDIOGRAM REPORT: CPT

## 2018-06-24 PROCEDURE — 74177 CT ABD & PELVIS W/CONTRAST: CPT | Mod: 26

## 2018-06-24 PROCEDURE — 99233 SBSQ HOSP IP/OBS HIGH 50: CPT

## 2018-06-24 RX ORDER — SODIUM CHLORIDE 9 MG/ML
1000 INJECTION INTRAMUSCULAR; INTRAVENOUS; SUBCUTANEOUS
Qty: 0 | Refills: 0 | Status: DISCONTINUED | OUTPATIENT
Start: 2018-06-24 | End: 2018-06-25

## 2018-06-24 RX ORDER — FENOFIBRATE,MICRONIZED 130 MG
145 CAPSULE ORAL DAILY
Qty: 0 | Refills: 0 | Status: DISCONTINUED | OUTPATIENT
Start: 2018-06-24 | End: 2018-06-26

## 2018-06-24 RX ORDER — ACETAMINOPHEN 500 MG
650 TABLET ORAL EVERY 6 HOURS
Qty: 0 | Refills: 0 | Status: DISCONTINUED | OUTPATIENT
Start: 2018-06-24 | End: 2018-06-26

## 2018-06-24 RX ORDER — ACETAMINOPHEN 500 MG
650 TABLET ORAL ONCE
Qty: 0 | Refills: 0 | Status: COMPLETED | OUTPATIENT
Start: 2018-06-24 | End: 2018-06-24

## 2018-06-24 RX ADMIN — Medication 650 MILLIGRAM(S): at 12:11

## 2018-06-24 RX ADMIN — FAMOTIDINE 104 MILLIGRAM(S): 10 INJECTION INTRAVENOUS at 17:13

## 2018-06-24 RX ADMIN — FAMOTIDINE 104 MILLIGRAM(S): 10 INJECTION INTRAVENOUS at 05:45

## 2018-06-24 RX ADMIN — Medication 1: at 12:11

## 2018-06-24 RX ADMIN — SODIUM CHLORIDE 200 MILLILITER(S): 9 INJECTION INTRAMUSCULAR; INTRAVENOUS; SUBCUTANEOUS at 22:42

## 2018-06-24 RX ADMIN — Medication 650 MILLIGRAM(S): at 15:59

## 2018-06-24 RX ADMIN — SODIUM CHLORIDE 150 MILLILITER(S): 9 INJECTION, SOLUTION INTRAVENOUS at 11:28

## 2018-06-24 RX ADMIN — ATENOLOL 25 MILLIGRAM(S): 25 TABLET ORAL at 05:45

## 2018-06-24 RX ADMIN — SODIUM CHLORIDE 200 MILLILITER(S): 9 INJECTION INTRAMUSCULAR; INTRAVENOUS; SUBCUTANEOUS at 17:13

## 2018-06-24 RX ADMIN — Medication 650 MILLIGRAM(S): at 22:43

## 2018-06-24 RX ADMIN — Medication 650 MILLIGRAM(S): at 23:43

## 2018-06-24 RX ADMIN — Medication 145 MILLIGRAM(S): at 11:28

## 2018-06-24 RX ADMIN — Medication 650 MILLIGRAM(S): at 16:49

## 2018-06-24 RX ADMIN — Medication 0.5 MILLIGRAM(S): at 22:42

## 2018-06-24 RX ADMIN — Medication 10 MILLIGRAM(S): at 05:10

## 2018-06-24 RX ADMIN — Medication 1: at 07:51

## 2018-06-24 NOTE — PROGRESS NOTE ADULT - SUBJECTIVE AND OBJECTIVE BOX
Patient is a 57y old  Female who presents with a chief complaint of Nausea, vomiting and abdominal pain (2018 22:52)      HPI:  56 yo F PMH DM HTN Chronic pancreatitis hypertriglyceridemia, and anxiety, presents to ED by car with complaints of abdominal pain and nausea with emesis X 1 day. She describes that symptoms resemble prior bouts of pancreatitis. Her pain is located at the epigastrium radiating to RUQ and is dull, 7/10 and constant. There is no relation to position or food intake. She reports decreased PO intake over last 24 hours due tot he nausea, and has vomited 3 times this morning, mostly undigested food. She reports solid BM this morning, normal in color, consistency and caliber, however, she decribes blood on toilet paper yesterday 9 she also describes a hx hemorrhoids) She denies dysphagia hematemesis, chest pain dyspnea fever or chills. (2018 22:52)  admitted with acute pancreatitis - pt seen and examine pt state abd pain little better , no fever , no distress , npo .    INTERVAL HPI/OVERNIGHT EVENTS:  T(C): 37.1 (18 @ 11:45), Max: 37.3 (-18 @ 07:42)  HR: 81 (--18 @ 11:45) (73 - 85)  BP: 119/75 (-24-18 @ 11:45) (115/64 - 173/91)  RR: 18 (-24-18 @ 11:45) (17 - 19)  SpO2: 93% (18 @ 11:45) (93% - 100%)  Wt(kg): --  I&O's Summary      REVIEW OF SYSTEMS:  CONSTITUTIONAL: No fever, weight loss, or fatigue  EYES: No eye pain, visual disturbances, or discharge  ENMT:   No sinus or throat pain  NECK: No pain or stiffness  BREASTS: No pain, no masses,   RESPIRATORY: No cough, wheezing, chills  No shortness of breath  CARDIOVASCULAR: No chest pain, palpitations, dizziness, or leg swelling  GASTROINTESTINAL: yes  abdominal yes  epigastric pain. No nausea, vomiting, No diarrhea or constipation  GENITOURINARY: No dysuria, frequency, hematuria, or incontinence  NEUROLOGICAL: No headaches, memory loss, loss of strength, numbness,   SKIN: No itching, burning, rashes, or lesions  MUSCULOSKELETAL: No joint pain or swelling; No muscle, back, or extremity pain      PHYSICAL EXAM:  GENERAL: NAD, well-groomed, well-developed  HEAD:  Atraumatic, Normocephalic  EYES: EOMI, PERRLA, conjunctiva and sclera clear  ENMT:  Moist mucous membranes, , No lesions  NECK: Supple, No JVD,   NERVOUS SYSTEM:  Alert & Oriented X3, Good concentration; Motor Strength 5/5 B/L upper and lower extremities; DTRs 2+ intact and symmetric  CHEST/LUNG: Clear to percussion bilaterally; No rales, rhonchi, wheezing, or rubs  HEART: Regular rate and rhythm; No murmurs, no tachy   ABDOMEN: Soft,  +tender, Nondistended; Bowel sounds present  EXTREMITIES:  2+ Peripheral Pulses, No clubbing, cyanosis, or edema    SKIN: No rashes or lesions    MEDICATIONS  (STANDING):  ATENolol  Tablet 25 milliGRAM(s) Oral daily  dextrose 5%. 1000 milliLiter(s) (50 mL/Hr) IV Continuous <Continuous>  dextrose 50% Injectable 12.5 Gram(s) IV Push once  famotidine  IVPB 20 milliGRAM(s) IV Intermittent two times a day  fenofibrate Tablet 145 milliGRAM(s) Oral daily  insulin lispro (HumaLOG) corrective regimen sliding scale   SubCutaneous three times a day before meals  lactated ringers. 1000 milliLiter(s) (150 mL/Hr) IV Continuous <Continuous>    MEDICATIONS  (PRN):  ALPRAZolam 0.5 milliGRAM(s) Oral two times a day PRN anxiety  dextrose 40% Gel 15 Gram(s) Oral once PRN Blood Glucose LESS THAN 70 milliGRAM(s)/deciliter  glucagon  Injectable 1 milliGRAM(s) IntraMuscular once PRN Glucose LESS THAN 70 milligrams/deciliter  metoclopramide Injectable 10 milliGRAM(s) IV Push every 6 hours PRN Nausea and/or Vomiting  morphine  - Injectable 2 milliGRAM(s) IV Push every 6 hours PRN Moderate Pain (4 - 6)  ondansetron Injectable 4 milliGRAM(s) IV Push every 6 hours PRN Nausea      LABS:                        13.1   19.80 )-----------( 263      ( 2018 10:53 )             37.8     06-24    137  |  101  |  11  ----------------------------<  200<H>  3.6   |  25  |  0.57    Ca    8.5      2018 09:48    TPro  6.8  /  Alb  3.1<L>  /  TBili  0.6  /  DBili  x   /  AST  24  /  ALT  45  /  AlkPhos  93  06-24    PT/INR - ( 2018 20:18 )   PT: 11.7 sec;   INR: 1.07 ratio         PTT - ( 2018 20:18 )  PTT:28.1 sec  Urinalysis Basic - ( 2018 20:18 )    Color: Yellow / Appearance: Clear / S.015 / pH: x  Gluc: x / Ketone: Trace  / Bili: Negative / Urobili: Negative   Blood: x / Protein: 25 mg/dL / Nitrite: Negative   Leuk Esterase: Negative / RBC: 0-2 /HPF / WBC 0-2   Sq Epi: x / Non Sq Epi: Occasional / Bacteria: Negative      CAPILLARY BLOOD GLUCOSE      POCT Blood Glucose.: 200 mg/dL (2018 11:44)  POCT Blood Glucose.: 197 mg/dL (2018 07:44)              RADIOLOGY & ADDITIONAL TESTS:    Imaging Personally Reviewed:       Advance Directives:      Palliative Care:

## 2018-06-24 NOTE — PROGRESS NOTE ADULT - PROBLEM SELECTOR PLAN 5
[place] score for prediction VTE 2 (low) will implement SCDs and contemplate SQ LMW heparin based on clinical course  is NPO, will institute GI PPx with pepcid (protonix with SE pancreatitis)

## 2018-06-24 NOTE — PROGRESS NOTE ADULT - PROBLEM SELECTOR PLAN 1
NPO, IVF on ns , pain meds   GI consult Dr Rosa  possible Attribute to hypertriglyceridemia  Serum Ca unable to view due to grossly lipemic blood sample

## 2018-06-24 NOTE — PROGRESS NOTE ADULT - ASSESSMENT
56 YO F PMH Pancreatitis, hypertigyleridemia, DM2, HTN on indapamide admitted with abdominal pain. acute  pancreatitis.

## 2018-06-25 ENCOUNTER — TRANSCRIPTION ENCOUNTER (OUTPATIENT)
Age: 58
End: 2018-06-25

## 2018-06-25 DIAGNOSIS — K85.90 ACUTE PANCREATITIS WITHOUT NECROSIS OR INFECTION, UNSPECIFIED: ICD-10-CM

## 2018-06-25 LAB
AMYLASE P1 CFR SERPL: 76 U/L — SIGNIFICANT CHANGE UP (ref 25–115)
ANION GAP SERPL CALC-SCNC: 7 MMOL/L — SIGNIFICANT CHANGE UP (ref 5–17)
ANION GAP SERPL CALC-SCNC: 7 MMOL/L — SIGNIFICANT CHANGE UP (ref 5–17)
BASOPHILS # BLD AUTO: 0 K/UL — SIGNIFICANT CHANGE UP (ref 0–0.2)
BASOPHILS NFR BLD AUTO: 0 % — SIGNIFICANT CHANGE UP (ref 0–2)
BUN SERPL-MCNC: 10 MG/DL — SIGNIFICANT CHANGE UP (ref 7–23)
BUN SERPL-MCNC: 8 MG/DL — SIGNIFICANT CHANGE UP (ref 7–23)
CALCIUM SERPL-MCNC: 8.2 MG/DL — LOW (ref 8.5–10.1)
CALCIUM SERPL-MCNC: 8.7 MG/DL — SIGNIFICANT CHANGE UP (ref 8.5–10.1)
CHLORIDE SERPL-SCNC: 106 MMOL/L — SIGNIFICANT CHANGE UP (ref 96–108)
CHLORIDE SERPL-SCNC: 107 MMOL/L — SIGNIFICANT CHANGE UP (ref 96–108)
CO2 SERPL-SCNC: 26 MMOL/L — SIGNIFICANT CHANGE UP (ref 22–31)
CO2 SERPL-SCNC: 28 MMOL/L — SIGNIFICANT CHANGE UP (ref 22–31)
CREAT SERPL-MCNC: 0.54 MG/DL — SIGNIFICANT CHANGE UP (ref 0.5–1.3)
CREAT SERPL-MCNC: 0.6 MG/DL — SIGNIFICANT CHANGE UP (ref 0.5–1.3)
EOSINOPHIL # BLD AUTO: 0.34 K/UL — SIGNIFICANT CHANGE UP (ref 0–0.5)
EOSINOPHIL NFR BLD AUTO: 2 % — SIGNIFICANT CHANGE UP (ref 0–6)
GLUCOSE SERPL-MCNC: 125 MG/DL — HIGH (ref 70–99)
GLUCOSE SERPL-MCNC: 157 MG/DL — HIGH (ref 70–99)
HCT VFR BLD CALC: 36.3 % — SIGNIFICANT CHANGE UP (ref 34.5–45)
HGB BLD-MCNC: 12.1 G/DL — SIGNIFICANT CHANGE UP (ref 11.5–15.5)
LG PLATELETS BLD QL AUTO: SLIGHT — SIGNIFICANT CHANGE UP
LIDOCAIN IGE QN: 770 U/L — HIGH (ref 73–393)
LYMPHOCYTES # BLD AUTO: 13 % — SIGNIFICANT CHANGE UP (ref 13–44)
LYMPHOCYTES # BLD AUTO: 2.23 K/UL — SIGNIFICANT CHANGE UP (ref 1–3.3)
MANUAL SMEAR VERIFICATION: SIGNIFICANT CHANGE UP
MCHC RBC-ENTMCNC: 27.8 PG — SIGNIFICANT CHANGE UP (ref 27–34)
MCHC RBC-ENTMCNC: 33.3 GM/DL — SIGNIFICANT CHANGE UP (ref 32–36)
MCV RBC AUTO: 83.3 FL — SIGNIFICANT CHANGE UP (ref 80–100)
MONOCYTES # BLD AUTO: 1.2 K/UL — HIGH (ref 0–0.9)
MONOCYTES NFR BLD AUTO: 7 % — SIGNIFICANT CHANGE UP (ref 2–14)
NEUTROPHILS # BLD AUTO: 13.41 K/UL — HIGH (ref 1.8–7.4)
NEUTROPHILS NFR BLD AUTO: 77 % — SIGNIFICANT CHANGE UP (ref 43–77)
NEUTS BAND # BLD: 1 % — SIGNIFICANT CHANGE UP (ref 0–8)
NRBC # BLD: 0 — SIGNIFICANT CHANGE UP
NRBC # BLD: SIGNIFICANT CHANGE UP /100 WBCS (ref 0–0)
PLAT MORPH BLD: NORMAL — SIGNIFICANT CHANGE UP
PLATELET # BLD AUTO: 244 K/UL — SIGNIFICANT CHANGE UP (ref 150–400)
POTASSIUM SERPL-MCNC: 3.1 MMOL/L — LOW (ref 3.5–5.3)
POTASSIUM SERPL-MCNC: 3.8 MMOL/L — SIGNIFICANT CHANGE UP (ref 3.5–5.3)
POTASSIUM SERPL-SCNC: 3.1 MMOL/L — LOW (ref 3.5–5.3)
POTASSIUM SERPL-SCNC: 3.8 MMOL/L — SIGNIFICANT CHANGE UP (ref 3.5–5.3)
RBC # BLD: 4.36 M/UL — SIGNIFICANT CHANGE UP (ref 3.8–5.2)
RBC # FLD: 14.6 % — HIGH (ref 10.3–14.5)
RBC BLD AUTO: SIGNIFICANT CHANGE UP
SODIUM SERPL-SCNC: 140 MMOL/L — SIGNIFICANT CHANGE UP (ref 135–145)
SODIUM SERPL-SCNC: 141 MMOL/L — SIGNIFICANT CHANGE UP (ref 135–145)
WBC # BLD: 17.19 K/UL — HIGH (ref 3.8–10.5)
WBC # FLD AUTO: 17.19 K/UL — HIGH (ref 3.8–10.5)

## 2018-06-25 PROCEDURE — 99233 SBSQ HOSP IP/OBS HIGH 50: CPT | Mod: GC

## 2018-06-25 RX ORDER — POTASSIUM CHLORIDE 20 MEQ
10 PACKET (EA) ORAL
Qty: 0 | Refills: 0 | Status: DISCONTINUED | OUTPATIENT
Start: 2018-06-25 | End: 2018-06-25

## 2018-06-25 RX ORDER — POTASSIUM CHLORIDE 20 MEQ
40 PACKET (EA) ORAL EVERY 4 HOURS
Qty: 0 | Refills: 0 | Status: COMPLETED | OUTPATIENT
Start: 2018-06-25 | End: 2018-06-25

## 2018-06-25 RX ORDER — POTASSIUM CHLORIDE 20 MEQ
10 PACKET (EA) ORAL
Qty: 0 | Refills: 0 | Status: COMPLETED | OUTPATIENT
Start: 2018-06-25 | End: 2018-06-25

## 2018-06-25 RX ORDER — SODIUM CHLORIDE 9 MG/ML
1000 INJECTION INTRAMUSCULAR; INTRAVENOUS; SUBCUTANEOUS
Qty: 0 | Refills: 0 | Status: DISCONTINUED | OUTPATIENT
Start: 2018-06-25 | End: 2018-06-26

## 2018-06-25 RX ORDER — LIPASE/PROTEASE/AMYLASE 16-48-48K
5 CAPSULE,DELAYED RELEASE (ENTERIC COATED) ORAL THREE TIMES A DAY
Qty: 0 | Refills: 0 | Status: DISCONTINUED | OUTPATIENT
Start: 2018-06-25 | End: 2018-06-26

## 2018-06-25 RX ADMIN — FAMOTIDINE 104 MILLIGRAM(S): 10 INJECTION INTRAVENOUS at 17:55

## 2018-06-25 RX ADMIN — FAMOTIDINE 104 MILLIGRAM(S): 10 INJECTION INTRAVENOUS at 06:30

## 2018-06-25 RX ADMIN — SODIUM CHLORIDE 125 MILLILITER(S): 9 INJECTION INTRAMUSCULAR; INTRAVENOUS; SUBCUTANEOUS at 21:27

## 2018-06-25 RX ADMIN — ATENOLOL 25 MILLIGRAM(S): 25 TABLET ORAL at 06:30

## 2018-06-25 RX ADMIN — Medication 40 MILLIEQUIVALENT(S): at 15:16

## 2018-06-25 RX ADMIN — Medication 100 MILLIEQUIVALENT(S): at 14:11

## 2018-06-25 RX ADMIN — Medication 100 MILLIEQUIVALENT(S): at 15:49

## 2018-06-25 RX ADMIN — Medication 650 MILLIGRAM(S): at 06:30

## 2018-06-25 RX ADMIN — Medication 5 CAPSULE(S): at 15:16

## 2018-06-25 RX ADMIN — SODIUM CHLORIDE 200 MILLILITER(S): 9 INJECTION INTRAMUSCULAR; INTRAVENOUS; SUBCUTANEOUS at 06:29

## 2018-06-25 RX ADMIN — Medication 40 MILLIEQUIVALENT(S): at 10:57

## 2018-06-25 RX ADMIN — Medication 5 CAPSULE(S): at 21:28

## 2018-06-25 RX ADMIN — Medication 100 MILLIEQUIVALENT(S): at 12:51

## 2018-06-25 RX ADMIN — Medication 145 MILLIGRAM(S): at 12:28

## 2018-06-25 RX ADMIN — Medication 0.5 MILLIGRAM(S): at 21:50

## 2018-06-25 RX ADMIN — Medication 650 MILLIGRAM(S): at 21:28

## 2018-06-25 NOTE — DISCHARGE NOTE ADULT - CARE PLAN
Principal Discharge DX:	Other acute pancreatitis  Secondary Diagnosis:	Hypertriglyceridemia, familial  Secondary Diagnosis:	Type 2 diabetes mellitus without complication, unspecified whether long term insulin use  Secondary Diagnosis:	Essential hypertension  Secondary Diagnosis:	Anxiety Principal Discharge DX:	Other acute pancreatitis  Goal:	Improving  Assessment and plan of treatment:	Continue fenofibrate  Follow up with GI as outpatient  Secondary Diagnosis:	Hypertriglyceridemia, familial  Goal:	Improving  Assessment and plan of treatment:	Plan as above  Secondary Diagnosis:	Type 2 diabetes mellitus without complication, unspecified whether long term insulin use  Goal:	Controlled  Assessment and plan of treatment:	Continue home meds  Secondary Diagnosis:	Essential hypertension  Goal:	Controlled  Assessment and plan of treatment:	Continue atenolol  Secondary Diagnosis:	Anxiety  Goal:	stable  Assessment and plan of treatment:	Continue Xanax Principal Discharge DX:	Other acute pancreatitis  Goal:	Improving  Assessment and plan of treatment:	Continue fenofibrate   Start Creon   Follow up with GI as outpatient  Secondary Diagnosis:	Hypertriglyceridemia, familial  Goal:	Improving  Assessment and plan of treatment:	continue no/low fat diet  Follow up with PCP for repeat lipid panel  Secondary Diagnosis:	Type 2 diabetes mellitus without complication, unspecified whether long term insulin use  Goal:	Controlled  Assessment and plan of treatment:	Continue home meds  Secondary Diagnosis:	Essential hypertension  Goal:	Controlled  Assessment and plan of treatment:	Continue atenolol  Secondary Diagnosis:	Anxiety  Goal:	stable  Assessment and plan of treatment:	Continue Xanax Principal Discharge DX:	Other acute pancreatitis  Goal:	Improving sec to high tg  Assessment and plan of treatment:	Continue fenofibrate   Start Creon   Follow up with GI as outpatient  Secondary Diagnosis:	Hypertriglyceridemia, familial  Goal:	Improving  Assessment and plan of treatment:	continue no/low fat diet  Follow up with PCP for repeat lipid panel  Secondary Diagnosis:	Type 2 diabetes mellitus without complication, unspecified whether long term insulin use  Goal:	Controlled  Assessment and plan of treatment:	Continue home meds  / metformin  Secondary Diagnosis:	Essential hypertension  Goal:	Controlled  Assessment and plan of treatment:	Continue atenolol , low salt diet  Secondary Diagnosis:	Anxiety  Goal:	stable  Assessment and plan of treatment:	Continue Xanax

## 2018-06-25 NOTE — CONSULT NOTE ADULT - ASSESSMENT
56 yo F PMH DM HTN Chronic pancreatitis hypertriglyceridemia, and anxiety, presents to ED by car with complaints of abdominal pain and nausea with emesis X 1 day. GI consulted for pancreatitis

## 2018-06-25 NOTE — PROGRESS NOTE ADULT - ASSESSMENT
58 YO F PMH Pancreatitis, hypertigyleridemia, DM2, HTN on indapamide admitted with abdominal pain. acute  pancreatitis. 56 YO F PMH Pancreatitis, hypertigyleridemia, DM2, HTN on indapamide admitted with abdominal pain. acute  pancreatitis sec to high tg .

## 2018-06-25 NOTE — DISCHARGE NOTE ADULT - PLAN OF CARE
Improving Continue fenofibrate  Follow up with GI as outpatient Plan as above Controlled Continue home meds Continue atenolol stable Continue Xanax Continue fenofibrate   Start Creon   Follow up with GI as outpatient continue no/low fat diet  Follow up with PCP for repeat lipid panel Improving sec to high tg Continue home meds  / metformin Continue atenolol , low salt diet

## 2018-06-25 NOTE — PROGRESS NOTE ADULT - SUBJECTIVE AND OBJECTIVE BOX
Patient is a 57y old  Female who presents with a chief complaint of Nausea, vomiting and abdominal pain (2018 22:52)    HPI: 58 yo F PMH DM HTN Chronic pancreatitis hypertriglyceridemia, and anxiety, presents to ED by car with complaints of abdominal pain and nausea with emesis X 1 day. She describes that symptoms resemble prior bouts of pancreatitis. Her pain is located at the epigastrium radiating to RUQ and is dull, 7/10 and constant. There is no relation to position or food intake. She reports decreased PO intake over last 24 hours due tot he nausea, and has vomited 3 times this morning, mostly undigested food. She reports solid BM this morning, normal in color, consistency and caliber, however, she decribes blood on toilet paper yesterday 9 she also describes a hx hemorrhoids) She denies dysphagia hematemesis, chest pain dyspnea fever or chills. (2018 22:52)  admitted with acute pancreatitis - pt seen and examine pt state abd pain little better , no fever , no distress , npo .    INTERVAL HPI/OVERNIGHT EVENTS:  Vital Signs Last 24 Hrs  T(C): 37.3 (2018 11:27), Max: 37.9 (2018 23:29)  T(F): 99.1 (2018 11:27), Max: 100.3 (2018 23:29)  HR: 75 (2018 11:27) (73 - 93)  BP: 117/76 (2018 11:27) (117/73 - 158/84)  RR: 18 (2018 11:27) (16 - 18)  SpO2: 95% (2018 11:27) (93% - 95%)    REVIEW OF SYSTEMS:  CONSTITUTIONAL: No fever, weight loss, or fatigue  EYES: No eye pain, visual disturbances, or discharge  ENMT:   No sinus or throat pain  NECK: No pain or stiffness  BREASTS: No pain, no masses,   RESPIRATORY: No cough, wheezing, chills  No shortness of breath  CARDIOVASCULAR: No chest pain, palpitations, dizziness, or leg swelling  GASTROINTESTINAL:  + mild epigastric/abdominal discomfort, No nausea, vomiting, No diarrhea or constipation  GENITOURINARY: No dysuria, frequency, hematuria, or incontinence  NEUROLOGICAL: No headaches, memory loss, loss of strength, numbness,   SKIN: No itching, burning, rashes, or lesions  MUSCULOSKELETAL: No joint pain or swelling; No muscle, back, or extremity pain    PHYSICAL EXAM:  GENERAL: NAD, well-groomed, well-developed  HEAD:  Atraumatic, Normocephalic  EYES: EOMI, PERRLA, conjunctiva and sclera clear  ENMT:  Moist mucous membranes, , No lesions  NECK: Supple, No JVD,   NERVOUS SYSTEM:  Alert & Oriented X3, Good concentration; Motor Strength 5/5 B/L upper and lower extremities; DTRs 2+ intact and symmetric  CHEST/LUNG: Clear to percussion bilaterally; No rales, rhonchi, wheezing, or rubs  HEART: Regular rate and rhythm; No murmurs, no tachy   ABDOMEN: Soft,  +tender, Nondistended; Bowel sounds present  EXTREMITIES:  2+ Peripheral Pulses, No clubbing, cyanosis, or edema    SKIN: No rashes or lesions    MEDICATIONS  (STANDING):  ATENolol  Tablet 25 milliGRAM(s) Oral daily  dextrose 5%. 1000 milliLiter(s) (50 mL/Hr) IV Continuous <Continuous>  dextrose 50% Injectable 12.5 Gram(s) IV Push once  famotidine  IVPB 20 milliGRAM(s) IV Intermittent two times a day  fenofibrate Tablet 145 milliGRAM(s) Oral daily  insulin lispro (HumaLOG) corrective regimen sliding scale   SubCutaneous three times a day before meals  potassium chloride    Tablet ER 40 milliEquivalent(s) Oral every 4 hours  sodium chloride 0.9%. 1000 milliLiter(s) (125 mL/Hr) IV Continuous <Continuous>    MEDICATIONS  (PRN):  acetaminophen   Tablet. 650 milliGRAM(s) Oral every 6 hours PRN Mild Pain (1 - 3)  ALPRAZolam 0.5 milliGRAM(s) Oral two times a day PRN anxiety                        12.1   17.19 )-----------( 244      ( 2018 06:49 )             36.3     2018 06:49    141    |  106    |  10     ----------------------------<  157    3.1     |  28     |  0.60     Ca    8.2        2018 06:49    TPro  6.8    /  Alb  3.1    /  TBili  0.6    /  DBili  x      /  AST  24     /  ALT  45     /  AlkPhos  93     2018 09:48    LIVER FUNCTIONS - ( 2018 09:48 )  Alb: 3.1 g/dL / Pro: 6.8 g/dL / ALK PHOS: 93 U/L / ALT: 45 U/L / AST: 24 U/L / GGT: x           PT/INR - ( 2018 20:18 )   PT: 11.7 sec;   INR: 1.07 ratio         PTT - ( 2018 20:18 )  PTT:28.1 sec  CAPILLARY BLOOD GLUCOSE      POCT Blood Glucose.: 148 mg/dL (2018 11:43)  POCT Blood Glucose.: 142 mg/dL (2018 07:38)  POCT Blood Glucose.: 129 mg/dL (2018 21:53)  POCT Blood Glucose.: 119 mg/dL (2018 16:40)    Urinalysis Basic - ( 2018 20:18 )  Color: Yellow / Appearance: Clear / S.015 / pH: x  Gluc: x / Ketone: Trace  / Bili: Negative / Urobili: Negative   Blood: x / Protein: 25 mg/dL / Nitrite: Negative   Leuk Esterase: Negative / RBC: 0-2 /HPF / WBC 0-2   Sq Epi: x / Non Sq Epi: Occasional / Bacteria: Negative    dextrose 40% Gel 15 Gram(s) Oral once PRN Blood Glucose LESS THAN 70 milliGRAM(s)/deciliter  glucagon  Injectable 1 milliGRAM(s) IntraMuscular once PRN Glucose LESS THAN 70 milligrams/deciliter  metoclopramide Injectable 10 milliGRAM(s) IV Push every 6 hours PRN Nausea and/or Vomiting  morphine  - Injectable 2 milliGRAM(s) IV Push every 6 hours PRN Moderate Pain (4 - 6)  ondansetron Injectable 4 milliGRAM(s) IV Push every 6 hours PRN Nausea    LABS:                         12.1   17.19 )-----------( 244      ( 2018 06:49 )             36.3     2018 06:49    141    |  106    |  10     ----------------------------<  157    3.1     |  28     |  0.60     Ca    8.2        2018 06:49    TPro  6.8    /  Alb  3.1    /  TBili  0.6    /  DBili  x      /  AST  24     /  ALT  45     /  AlkPhos  93     2018 09:48    LIVER FUNCTIONS - ( 2018 09:48 )  Alb: 3.1 g/dL / Pro: 6.8 g/dL / ALK PHOS: 93 U/L / ALT: 45 U/L / AST: 24 U/L / GGT: x           PT/INR - ( 2018 20:18 )   PT: 11.7 sec;   INR: 1.07 ratio      PTT - ( 2018 20:18 )  PTT:28.1 sec  CAPILLARY BLOOD GLUCOSE  POCT Blood Glucose.: 148 mg/dL (2018 11:43)  POCT Blood Glucose.: 142 mg/dL (2018 07:38)  POCT Blood Glucose.: 129 mg/dL (2018 21:53)  POCT Blood Glucose.: 119 mg/dL (2018 16:40)    Urinalysis Basic - ( 2018 20:18 )  Color: Yellow / Appearance: Clear / S.015 / pH: x  Gluc: x / Ketone: Trace  / Bili: Negative / Urobili: Negative   Blood: x / Protein: 25 mg/dL / Nitrite: Negative   Leuk Esterase: Negative / RBC: 0-2 /HPF / WBC 0-2   Sq Epi: x / Non Sq Epi: Occasional / Bacteria: Negative      RADIOLOGY & ADDITIONAL TESTS:  < from: Xray Chest 2 Views PA/Lat (18 @ 20:30) >  No evidence for pulmonary consolidation, pleural effusion or   pneumothorax.    Small linear atelectasis in the lung bases bilaterally.    The trachea is midline.    Mild enlargement of the cardiac silhouette.    No evidence for free air under the diaphragm.    Imaging Personally Reviewed:     < from: CT Abdomen and Pelvis w/ Oral Cont and w/ IV Cont (18 @ 01:08) >  Acute pancreatitis. No peripancreatic fluid collection.    Diffuse hepatic steatosis. Patient is a 57y old  Female who presents with a chief complaint of Nausea, vomiting and abdominal pain (2018 22:52)    HPI: 56 yo F PMH DM HTN Chronic pancreatitis hypertriglyceridemia, and anxiety, presents to ED by car with complaints of abdominal pain and nausea with emesis X 1 day. She describes that symptoms resemble prior bouts of pancreatitis. Her pain is located at the epigastrium radiating to RUQ and is dull, 7/10 and constant. There is no relation to position or food intake. She reports decreased PO intake over last 24 hours due tot he nausea, and has vomited 3 times this morning, mostly undigested food. She reports solid BM this morning, normal in color, consistency and caliber, however, she decribes blood on toilet paper yesterday 9 she also describes a hx hemorrhoids) She denies dysphagia hematemesis, chest pain dyspnea fever or chills. (2018 22:52)  admitted with acute pancreatitis - pt seen and examine pt state abd pain little better , no fever , no distress , npo .    INTERVAL HPI/OVERNIGHT EVENTS:  Vital Signs Last 24 Hrs  T(C): 37.3 (2018 11:27), Max: 37.9 (2018 23:29)  T(F): 99.1 (2018 11:27), Max: 100.3 (2018 23:29)  HR: 75 (2018 11:27) (73 - 93)  BP: 117/76 (2018 11:27) (117/73 - 158/84)  RR: 18 (2018 11:27) (16 - 18)  SpO2: 95% (2018 11:27) (93% - 95%)    REVIEW OF SYSTEMS:  CONSTITUTIONAL: No fever, weight loss, or fatigue  EYES: No eye pain, visual disturbances, or discharge  ENMT:   No sinus or throat pain  NECK: No pain or stiffness  BREASTS: No pain, no masses,   RESPIRATORY: No cough, wheezing, chills  No shortness of breath  CARDIOVASCULAR: No chest pain, palpitations, dizziness, or leg swelling  GASTROINTESTINAL:  + mild epigastric/abdominal discomfort, No nausea, vomiting, No diarrhea or constipation  GENITOURINARY: No dysuria, frequency, hematuria, or incontinence  NEUROLOGICAL: No headaches, memory loss, loss of strength, numbness,   SKIN: No itching, burning, rashes, or lesions  MUSCULOSKELETAL: No joint pain or swelling; No muscle, back, or extremity pain    PHYSICAL EXAM:  GENERAL: NAD, well-groomed, well-developed  HEAD:  Atraumatic, Normocephalic  EYES: EOMI, PERRLA, conjunctiva and sclera clear  ENMT:  Moist mucous membranes, , No lesions  NECK: Supple, No JVD,   NERVOUS SYSTEM:  Alert & Oriented X3, Good concentration; Motor Strength 5/5 B/L upper and lower extremities; DTRs 2+ intact and symmetric  CHEST/LUNG: Clear to percussion bilaterally; No rales, rhonchi, wheezing,   HEART: Regular rate and rhythm; No murmurs, no tachy   ABDOMEN: Soft,  +tender, Nondistended; Bowel sounds present  EXTREMITIES:  2+ Peripheral Pulses, No clubbing, cyanosis, or edema    SKIN: No rashes or lesions    MEDICATIONS  (STANDING):  ATENolol  Tablet 25 milliGRAM(s) Oral daily  dextrose 5%. 1000 milliLiter(s) (50 mL/Hr) IV Continuous <Continuous>  dextrose 50% Injectable 12.5 Gram(s) IV Push once  famotidine  IVPB 20 milliGRAM(s) IV Intermittent two times a day  fenofibrate Tablet 145 milliGRAM(s) Oral daily  insulin lispro (HumaLOG) corrective regimen sliding scale   SubCutaneous three times a day before meals  potassium chloride    Tablet ER 40 milliEquivalent(s) Oral every 4 hours  sodium chloride 0.9%. 1000 milliLiter(s) (125 mL/Hr) IV Continuous <Continuous>    MEDICATIONS  (PRN):  acetaminophen   Tablet. 650 milliGRAM(s) Oral every 6 hours PRN Mild Pain (1 - 3)  ALPRAZolam 0.5 milliGRAM(s) Oral two times a day PRN anxiety                        12.1   17.19 )-----------( 244      ( 2018 06:49 )             36.3     2018 06:49    141    |  106    |  10     ----------------------------<  157    3.1     |  28     |  0.60     Ca    8.2        2018 06:49    TPro  6.8    /  Alb  3.1    /  TBili  0.6    /  DBili  x      /  AST  24     /  ALT  45     /  AlkPhos  93     2018 09:48    LIVER FUNCTIONS - ( 2018 09:48 )  Alb: 3.1 g/dL / Pro: 6.8 g/dL / ALK PHOS: 93 U/L / ALT: 45 U/L / AST: 24 U/L / GGT: x           PT/INR - ( 2018 20:18 )   PT: 11.7 sec;   INR: 1.07 ratio         PTT - ( 2018 20:18 )  PTT:28.1 sec  CAPILLARY BLOOD GLUCOSE      POCT Blood Glucose.: 148 mg/dL (2018 11:43)  POCT Blood Glucose.: 142 mg/dL (2018 07:38)  POCT Blood Glucose.: 129 mg/dL (2018 21:53)  POCT Blood Glucose.: 119 mg/dL (2018 16:40)    Urinalysis Basic - ( 2018 20:18 )  Color: Yellow / Appearance: Clear / S.015 / pH: x  Gluc: x / Ketone: Trace  / Bili: Negative / Urobili: Negative   Blood: x / Protein: 25 mg/dL / Nitrite: Negative   Leuk Esterase: Negative / RBC: 0-2 /HPF / WBC 0-2   Sq Epi: x / Non Sq Epi: Occasional / Bacteria: Negative    dextrose 40% Gel 15 Gram(s) Oral once PRN Blood Glucose LESS THAN 70 milliGRAM(s)/deciliter  glucagon  Injectable 1 milliGRAM(s) IntraMuscular once PRN Glucose LESS THAN 70 milligrams/deciliter  metoclopramide Injectable 10 milliGRAM(s) IV Push every 6 hours PRN Nausea and/or Vomiting  morphine  - Injectable 2 milliGRAM(s) IV Push every 6 hours PRN Moderate Pain (4 - 6)  ondansetron Injectable 4 milliGRAM(s) IV Push every 6 hours PRN Nausea    LABS:                         12.1   17.19 )-----------( 244      ( 2018 06:49 )             36.3     2018 06:49    141    |  106    |  10     ----------------------------<  157    3.1     |  28     |  0.60     Ca    8.2        2018 06:49    TPro  6.8    /  Alb  3.1    /  TBili  0.6    /  DBili  x      /  AST  24     /  ALT  45     /  AlkPhos  93     2018 09:48    LIVER FUNCTIONS - ( 2018 09:48 )  Alb: 3.1 g/dL / Pro: 6.8 g/dL / ALK PHOS: 93 U/L / ALT: 45 U/L / AST: 24 U/L / GGT: x           PT/INR - ( 2018 20:18 )   PT: 11.7 sec;   INR: 1.07 ratio      PTT - ( 2018 20:18 )  PTT:28.1 sec  CAPILLARY BLOOD GLUCOSE  POCT Blood Glucose.: 148 mg/dL (2018 11:43)  POCT Blood Glucose.: 142 mg/dL (2018 07:38)  POCT Blood Glucose.: 129 mg/dL (2018 21:53)  POCT Blood Glucose.: 119 mg/dL (2018 16:40)    Urinalysis Basic - ( 2018 20:18 )  Color: Yellow / Appearance: Clear / S.015 / pH: x  Gluc: x / Ketone: Trace  / Bili: Negative / Urobili: Negative   Blood: x / Protein: 25 mg/dL / Nitrite: Negative   Leuk Esterase: Negative / RBC: 0-2 /HPF / WBC 0-2   Sq Epi: x / Non Sq Epi: Occasional / Bacteria: Negative      RADIOLOGY & ADDITIONAL TESTS:  < from: Xray Chest 2 Views PA/Lat (18 @ 20:30) >  No evidence for pulmonary consolidation, pleural effusion or   pneumothorax.    Small linear atelectasis in the lung bases bilaterally.    The trachea is midline.    Mild enlargement of the cardiac silhouette.    No evidence for free air under the diaphragm.    Imaging Personally Reviewed:     < from: CT Abdomen and Pelvis w/ Oral Cont and w/ IV Cont (18 @ 01:08) >  Acute pancreatitis. No peripancreatic fluid collection.    Diffuse hepatic steatosis.

## 2018-06-25 NOTE — CONSULT NOTE ADULT - SUBJECTIVE AND OBJECTIVE BOX
Chief Complaint:  Patient is a 57y old  Female who presents with a chief complaint of Nausea, vomiting and abdominal pain (2018 22:52)    Chronic pancreatitis, unspecified pancreatitis type  Diabetes  HTN (hypertension)  Anxiety  History of uterine fibroid     HPI:  56 yo F PMH DM HTN Chronic pancreatitis hypertriglyceridemia, and anxiety, presents to ED by car with complaints of abdominal pain and nausea with emesis X 1 day. She describes that symptoms resemble prior bouts of pancreatitis. Her pain is located at the epigastrium radiating to RUQ and is dull, 7/10 and constant. There is no relation to position or food intake. She reports decreased PO intake over last 24 hours due tot he nausea, and has vomited 3 times this morning, mostly undigested food. She reports solid BM this morning, normal in color, consistency and caliber, however, she decribes blood on toilet paper yesterday 9 she also describes a hx hemorrhoids) She denies dysphagia hematemesis, chest pain dyspnea fever or chills. (2018 22:52)    GI consulted for pancreatitis. Pt seen and examined. Reports hx of multiple past attacks 2/2 hypertriglyceridemia. States she has been non compliant with diet. Takes fenofibrate at home. Sees dr alexis for GI as outpatient, unavailable for hospital consult. Last bm this past saturday, passing gas. Currently with 5/10 abd pain. denies current n/v. tolerating clears. Denies etoh use.      codeine (Unknown)      acetaminophen   Tablet. 650 milliGRAM(s) Oral every 6 hours PRN  ALPRAZolam 0.5 milliGRAM(s) Oral two times a day PRN  ATENolol  Tablet 25 milliGRAM(s) Oral daily  dextrose 40% Gel 15 Gram(s) Oral once PRN  dextrose 5%. 1000 milliLiter(s) IV Continuous <Continuous>  dextrose 50% Injectable 12.5 Gram(s) IV Push once  famotidine  IVPB 20 milliGRAM(s) IV Intermittent two times a day  fenofibrate Tablet 145 milliGRAM(s) Oral daily  glucagon  Injectable 1 milliGRAM(s) IntraMuscular once PRN  insulin lispro (HumaLOG) corrective regimen sliding scale   SubCutaneous three times a day before meals  metoclopramide Injectable 10 milliGRAM(s) IV Push every 6 hours PRN  morphine  - Injectable 2 milliGRAM(s) IV Push every 6 hours PRN  ondansetron Injectable 4 milliGRAM(s) IV Push every 6 hours PRN  potassium chloride    Tablet ER 40 milliEquivalent(s) Oral every 4 hours  potassium chloride  10 mEq/100 mL IVPB 10 milliEquivalent(s) IV Intermittent every 1 hour  sodium chloride 0.9%. 1000 milliLiter(s) IV Continuous <Continuous>        FAMILY HISTORY:  Family history of diabetes mellitus (DM) (Father)  Family history of breast cancer (Mother)        Review of Systems:    General:  No wt loss, fevers, chills, night sweats, fatigue  Eyes:  Good vision, no reported pain  ENT:  No sore throat, pain, runny nose, dysphagia  CV:  No pain, palpitations, no lightheadedness  Resp:  No dyspnea, cough, tachypnea, wheezing  GI: see above  :  No pain, bleeding, incontinence, nocturia  Muscle:  No pain, weakness  Neuro:  No weakness, tingling, memory problems  Psych:  No fatigue, insomnia, mood problems, depression  Endocrine:  No polyuria, polydypsia, cold/heat intolerance  Heme:  No petechiae, ecchymosis, easy bruisability  Skin:  No rash, tattoos, scars, edema    Relevant Family History:   n/c    Relevant Social History: n/c      Physical Exam:    Vital Signs:  Vital Signs Last 24 Hrs  T(C): 37.1 (2018 12:46), Max: 37.9 (2018 23:29)  T(F): 98.8 (2018 12:46), Max: 100.3 (2018 23:29)  HR: 107 (2018 12:46) (73 - 107)  BP: 114/68 (2018 12:46) (114/68 - 158/84)  BP(mean): --  RR: 17 (2018 12:46) (16 - 18)  SpO2: 96% (2018 12:46) (93% - 96%)  Daily     Daily     General:  lying in bed in nad  HEENT:  NC/AT,  conjunctivae clear and pink  Chest:  dec bs  Cardiovascular:  Regular rhythm, S1, S2  Abdomen:  Soft, ttp, +dt, normoactive bowel sounds  Extremities:  no edema  Skin:  No rash  Neuro/Psych:  A&O  x3    Laboratory:                            12.1   17.19 )-----------( 244      ( 2018 06:49 )             36.3     -    141  |  106  |  10  ----------------------------<  157<H>  3.1<L>   |  28  |  0.60    Ca    8.2<L>      2018 06:49    TPro  6.8  /  Alb  3.1<L>  /  TBili  0.6  /  DBili  x   /  AST  24  /  ALT  45  /  AlkPhos  93  06-24    LIVER FUNCTIONS - ( 2018 09:48 )  Alb: 3.1 g/dL / Pro: 6.8 g/dL / ALK PHOS: 93 U/L / ALT: 45 U/L / AST: 24 U/L / GGT: x           PT/INR - ( 2018 20:18 )   PT: 11.7 sec;   INR: 1.07 ratio         PTT - ( 2018 20:18 )  PTT:28.1 sec  Urinalysis Basic - ( 2018 20:18 )    Color: Yellow / Appearance: Clear / S.015 / pH: x  Gluc: x / Ketone: Trace  / Bili: Negative / Urobili: Negative   Blood: x / Protein: 25 mg/dL / Nitrite: Negative   Leuk Esterase: Negative / RBC: 0-2 /HPF / WBC 0-2   Sq Epi: x / Non Sq Epi: Occasional / Bacteria: Negative      Amylase Serum76      Lipase [id number]       Ammonia--  Amylase Serum--      Lipase [id number]       Ammonia--    Imaging:

## 2018-06-25 NOTE — DISCHARGE NOTE ADULT - SECONDARY DIAGNOSIS.
Hypertriglyceridemia, familial Type 2 diabetes mellitus without complication, unspecified whether long term insulin use Essential hypertension Anxiety

## 2018-06-25 NOTE — DISCHARGE NOTE ADULT - CARE PROVIDER_API CALL
Darrin Edwards), Internal Medicine  [address] [place] [address]  Phone: [phone]  Fax: [phone]    Miky Rosa), Gastroenterology; Internal Medicine  [address] [place] [address]  Phone: [phone]  Fax: [phone]

## 2018-06-25 NOTE — DISCHARGE NOTE ADULT - CARE PROVIDERS DIRECT ADDRESSES
[email or web address],DirectAddress_Unknown

## 2018-06-25 NOTE — DISCHARGE NOTE ADULT - HOSPITAL COURSE
56 yo F PMH DM HTN Chronic pancreatitis hypertriglyceridemia, and anxiety, presented with complaints of abdominal pain and nausea with emesis X 1 day. She described that symptoms resemble prior bouts of pancreatitis. Her pain is located at the epigastrium radiating to RUQ and is dull, 7/10 and constant. Patient had CT A/P which showed acute pancreatitis. Patient's lipase trended down 2892--> 2346--> 770. TGs were 1650. She was made NPO, started on IVF and given pain meds. Patient was continued on fenofibrate and lipase trended down. Patient's diet was advanced as tolerated. Patient was seen by GI and started on Creon with meals, zofran prn and pepcid. Patient continued on home bp meds. 56 yo F PMH DM HTN Chronic pancreatitis hypertriglyceridemia, and anxiety, presented with complaints of abdominal pain and nausea with emesis X 1 day. She described that symptoms resemble prior bouts of pancreatitis. Her pain is located at the epigastrium radiating to RUQ and is dull, 7/10 and constant. Patient had CT A/P which showed acute pancreatitis. Patient's lipase trended down 2892--> 2346--> 770-->550. TGs were 1650. She was made NPO, started on IVF and given pain meds. Patient was continued on fenofibrate and lipase trended down. Patient's diet was advanced as tolerated. Patient was seen by GI and started on Creon with meals, zofran prn and pepcid. Patient continued on home bp meds. Patient seen by nutritionist for education about no/low fat diet. Patient advised to follow up with PMD to repeat lipid panel.     On discharge day, patient's vitals and physical exam were:   Vital Signs Last 24 Hrs  T(C): 36.5 (26 Jun 2018 07:29), Max: 37.7 (25 Jun 2018 19:56)  T(F): 97.7 (26 Jun 2018 07:29), Max: 99.8 (25 Jun 2018 19:56)  HR: 83 (26 Jun 2018 07:29) (73 - 86)  BP: 134/86 (26 Jun 2018 07:29) (117/76 - 135/83)  RR: 20 (26 Jun 2018 07:29) (18 - 20)  SpO2: 95% (26 Jun 2018 07:29) (95% - 98%)    PHYSICAL EXAM:  GENERAL: NAD, well-groomed, well-developed  HEAD:  Atraumatic, Normocephalic  EYES: EOMI, PERRLA, conjunctiva and sclera clear  ENMT:  Moist mucous membranes, , No lesions  NECK: Supple, No JVD,   NERVOUS SYSTEM:  Alert & Oriented X3, Good concentration; Motor Strength 5/5 B/L upper and lower extremities  CHEST/LUNG: Clear to percussion bilaterally; No rales, rhonchi, wheezing,   HEART: Regular rate and rhythm; No murmurs, no tachy   ABDOMEN: Soft, +tender, mildly distended; no guarding, no rebound  EXTREMITIES:  2+ Peripheral Pulses, No clubbing, cyanosis, or edema    Patient medically optimized and hemodynamically stable for discharge to home. 58 yo F PMH DM HTN Chronic pancreatitis hypertriglyceridemia, and anxiety, presented with complaints of abdominal pain and nausea with emesis X 1 day. She described that symptoms resemble prior bouts of  acute pancreatitis. Her pain is located at the epigastrium radiating to RUQ and is dull, 7/10 and constant. Patient had CT A/P which showed acute pancreatitis. Patient's lipase trended down 2892--> 2346--> 770-->550. TGs were 1650. She was made NPO, started on IVF and given pain meds. Patient was continued on fenofibrate and lipase trended down  cause of acute pancreatitis this time . Patient's diet was advanced as tolerated. Patient was seen by GI and started on Creon with meals, zofran prn and pepcid. Patient continued on home bp meds. Patient seen by nutritionist for education about no/low fat diet. Patient advised to follow up with PMD dr honeycutt  to repeat lipid panel.     On discharge day, patient's vitals and physical exam were:  6-26-18   Vital Signs Last 24 Hrs  T(C): 36.5 (26 Jun 2018 07:29), Max: 37.7 (25 Jun 2018 19:56)  T(F): 97.7 (26 Jun 2018 07:29), Max: 99.8 (25 Jun 2018 19:56)  HR: 83 (26 Jun 2018 07:29) (73 - 86)  BP: 134/86 (26 Jun 2018 07:29) (117/76 - 135/83)  RR: 20 (26 Jun 2018 07:29) (18 - 20)  SpO2: 95% (26 Jun 2018 07:29) (95% - 98%)    PHYSICAL EXAM:  GENERAL: NAD, well-groomed, well-developed  HEAD:  Atraumatic, Normocephalic  EYES: EOMI, PERRLA, conjunctiva and sclera clear  ENMT:  Moist mucous membranes, , No lesions  NECK: Supple, No JVD,   NERVOUS SYSTEM:  Alert & Oriented X3, Good concentration; Motor Strength 5/5 B/L upper and lower extremities  CHEST/LUNG: Clear to percussion bilaterally; No rales, rhonchi, wheezing,   HEART: Regular rate and rhythm; No murmurs, no tachy   ABDOMEN: Soft, +tender, mildly distended;   EXTREMITIES:  2+ Peripheral Pulses, No clubbing, cyanosis, or edema    Patient medically optimized and hemodynamically stable for discharge to home.

## 2018-06-25 NOTE — DISCHARGE NOTE ADULT - MEDICATION SUMMARY - MEDICATIONS TO TAKE
I will START or STAY ON the medications listed below when I get home from the hospital:    naproxen 250 mg oral tablet  -- 1 tab(s) by mouth 2 times a day  -- Indication: For Prophylactic measure    metFORMIN 500 mg oral tablet  -- 1 tab(s) by mouth 2 times a day  -- Indication: For dm type2     fenofibrate 160 mg oral tablet  -- 1 tab(s) by mouth once a day  -- Indication: For Hld     Xanax 0.5 mg oral tablet  --  by mouth   -- Indication: For Anxiety    atenolol 25 mg oral tablet  -- 1 tab(s) by mouth once a day  -- Indication: For Htn    indapamide 1.25 mg oral tablet  -- 1 tab(s) by mouth once a day (in the morning)  -- Indication: For need prophy

## 2018-06-25 NOTE — PROGRESS NOTE ADULT - PROBLEM SELECTOR PLAN 1
Likely 2/2 hypertriglyceridemia. Improved lipase  - continue IVF @ 125  - CLD. advance diet as tolerated  -GI consult Jolynn (does not come to hospital). Courtney consulted Likely 2/2 hypertriglyceridemia. Improved lipase  - continue IVF @ 125  advance diet as tolerated  on clear liquid   -GI consult Jolynn (does not come to hospital). Courtney consulted

## 2018-06-25 NOTE — CONSULT NOTE ADULT - PROBLEM SELECTOR RECOMMENDATION 9
chronic, suspected 2/2 hypertriglyceridemia  clears/ivf, will likely advance diet tomorrow if pain improves  cont fenofibrate  start creon with meals  trend labs  pain control  zofran prn, pepcid qd  serial abd exams  monitor for bms  low fat diet  nutrition eval  etoh abstinence  needs close outpatient gi f/u

## 2018-06-25 NOTE — DISCHARGE NOTE ADULT - ADDITIONAL INSTRUCTIONS
Follow  up with GI as outpatient Follow  up with GI as outpatient  Followup with PMD to repeat lipid panel within 1 week Follow  up with GI as outpatient dr alexis office with in 1wk .   Followup with PMD dr honeycutt aware  to repeat lipid panel within 1 week

## 2018-06-26 ENCOUNTER — RESULT REVIEW (OUTPATIENT)
Age: 58
End: 2018-06-26

## 2018-06-26 VITALS — WEIGHT: 179.24 LBS

## 2018-06-26 DIAGNOSIS — K59.00 CONSTIPATION, UNSPECIFIED: ICD-10-CM

## 2018-06-26 LAB
ALBUMIN SERPL ELPH-MCNC: 2.9 G/DL — LOW (ref 3.3–5)
ALP SERPL-CCNC: 86 U/L — SIGNIFICANT CHANGE UP (ref 40–120)
ALT FLD-CCNC: 26 U/L — SIGNIFICANT CHANGE UP (ref 12–78)
AMYLASE P1 CFR SERPL: 63 U/L — SIGNIFICANT CHANGE UP (ref 25–115)
ANION GAP SERPL CALC-SCNC: 7 MMOL/L — SIGNIFICANT CHANGE UP (ref 5–17)
AST SERPL-CCNC: 13 U/L — LOW (ref 15–37)
BILIRUB SERPL-MCNC: 0.5 MG/DL — SIGNIFICANT CHANGE UP (ref 0.2–1.2)
BUN SERPL-MCNC: 9 MG/DL — SIGNIFICANT CHANGE UP (ref 7–23)
CALCIUM SERPL-MCNC: 8.8 MG/DL — SIGNIFICANT CHANGE UP (ref 8.5–10.1)
CHLORIDE SERPL-SCNC: 108 MMOL/L — SIGNIFICANT CHANGE UP (ref 96–108)
CO2 SERPL-SCNC: 25 MMOL/L — SIGNIFICANT CHANGE UP (ref 22–31)
CREAT SERPL-MCNC: 0.52 MG/DL — SIGNIFICANT CHANGE UP (ref 0.5–1.3)
GLUCOSE SERPL-MCNC: 141 MG/DL — HIGH (ref 70–99)
HCT VFR BLD CALC: 35.6 % — SIGNIFICANT CHANGE UP (ref 34.5–45)
HGB BLD-MCNC: 11.7 G/DL — SIGNIFICANT CHANGE UP (ref 11.5–15.5)
LIDOCAIN IGE QN: 597 U/L — HIGH (ref 73–393)
MCHC RBC-ENTMCNC: 27.3 PG — SIGNIFICANT CHANGE UP (ref 27–34)
MCHC RBC-ENTMCNC: 32.9 GM/DL — SIGNIFICANT CHANGE UP (ref 32–36)
MCV RBC AUTO: 83 FL — SIGNIFICANT CHANGE UP (ref 80–100)
NRBC # BLD: 0 /100 WBCS — SIGNIFICANT CHANGE UP (ref 0–0)
PLATELET # BLD AUTO: 240 K/UL — SIGNIFICANT CHANGE UP (ref 150–400)
POTASSIUM SERPL-MCNC: 3.6 MMOL/L — SIGNIFICANT CHANGE UP (ref 3.5–5.3)
POTASSIUM SERPL-SCNC: 3.6 MMOL/L — SIGNIFICANT CHANGE UP (ref 3.5–5.3)
PROT SERPL-MCNC: 7.1 G/DL — SIGNIFICANT CHANGE UP (ref 6–8.3)
RBC # BLD: 4.29 M/UL — SIGNIFICANT CHANGE UP (ref 3.8–5.2)
RBC # FLD: 14.5 % — SIGNIFICANT CHANGE UP (ref 10.3–14.5)
SODIUM SERPL-SCNC: 140 MMOL/L — SIGNIFICANT CHANGE UP (ref 135–145)
WBC # BLD: 17.04 K/UL — HIGH (ref 3.8–10.5)
WBC # FLD AUTO: 17.04 K/UL — HIGH (ref 3.8–10.5)

## 2018-06-26 PROCEDURE — 81001 URINALYSIS AUTO W/SCOPE: CPT

## 2018-06-26 PROCEDURE — 71046 X-RAY EXAM CHEST 2 VIEWS: CPT

## 2018-06-26 PROCEDURE — 74177 CT ABD & PELVIS W/CONTRAST: CPT

## 2018-06-26 PROCEDURE — 83690 ASSAY OF LIPASE: CPT

## 2018-06-26 PROCEDURE — 93005 ELECTROCARDIOGRAM TRACING: CPT

## 2018-06-26 PROCEDURE — 80048 BASIC METABOLIC PNL TOTAL CA: CPT

## 2018-06-26 PROCEDURE — 83605 ASSAY OF LACTIC ACID: CPT

## 2018-06-26 PROCEDURE — 80061 LIPID PANEL: CPT

## 2018-06-26 PROCEDURE — 82962 GLUCOSE BLOOD TEST: CPT

## 2018-06-26 PROCEDURE — 76700 US EXAM ABDOM COMPLETE: CPT

## 2018-06-26 PROCEDURE — 99239 HOSP IP/OBS DSCHRG MGMT >30: CPT

## 2018-06-26 PROCEDURE — 99285 EMERGENCY DEPT VISIT HI MDM: CPT | Mod: 25

## 2018-06-26 PROCEDURE — 96374 THER/PROPH/DIAG INJ IV PUSH: CPT

## 2018-06-26 PROCEDURE — 85730 THROMBOPLASTIN TIME PARTIAL: CPT

## 2018-06-26 PROCEDURE — 36415 COLL VENOUS BLD VENIPUNCTURE: CPT

## 2018-06-26 PROCEDURE — 96375 TX/PRO/DX INJ NEW DRUG ADDON: CPT

## 2018-06-26 PROCEDURE — 80053 COMPREHEN METABOLIC PANEL: CPT

## 2018-06-26 PROCEDURE — 82150 ASSAY OF AMYLASE: CPT

## 2018-06-26 PROCEDURE — 85027 COMPLETE CBC AUTOMATED: CPT

## 2018-06-26 PROCEDURE — 83036 HEMOGLOBIN GLYCOSYLATED A1C: CPT

## 2018-06-26 PROCEDURE — 85610 PROTHROMBIN TIME: CPT

## 2018-06-26 RX ORDER — POLYETHYLENE GLYCOL 3350 17 G/17G
17 POWDER, FOR SOLUTION ORAL ONCE
Qty: 0 | Refills: 0 | Status: COMPLETED | OUTPATIENT
Start: 2018-06-26 | End: 2018-06-26

## 2018-06-26 RX ORDER — DOCUSATE SODIUM 100 MG
100 CAPSULE ORAL THREE TIMES A DAY
Qty: 0 | Refills: 0 | Status: DISCONTINUED | OUTPATIENT
Start: 2018-06-26 | End: 2018-06-26

## 2018-06-26 RX ORDER — SENNA PLUS 8.6 MG/1
2 TABLET ORAL AT BEDTIME
Qty: 0 | Refills: 0 | Status: DISCONTINUED | OUTPATIENT
Start: 2018-06-26 | End: 2018-06-26

## 2018-06-26 RX ORDER — GLYCERIN ADULT
1 SUPPOSITORY, RECTAL RECTAL DAILY
Qty: 0 | Refills: 0 | Status: DISCONTINUED | OUTPATIENT
Start: 2018-06-26 | End: 2018-06-26

## 2018-06-26 RX ORDER — LIPASE/PROTEASE/AMYLASE 16-48-48K
5 CAPSULE,DELAYED RELEASE (ENTERIC COATED) ORAL
Qty: 450 | Refills: 0
Start: 2018-06-26 | End: 2018-07-25

## 2018-06-26 RX ADMIN — ATENOLOL 25 MILLIGRAM(S): 25 TABLET ORAL at 06:56

## 2018-06-26 RX ADMIN — FAMOTIDINE 104 MILLIGRAM(S): 10 INJECTION INTRAVENOUS at 07:14

## 2018-06-26 RX ADMIN — Medication 5 CAPSULE(S): at 06:55

## 2018-06-26 RX ADMIN — Medication 5 CAPSULE(S): at 13:03

## 2018-06-26 RX ADMIN — Medication 145 MILLIGRAM(S): at 13:03

## 2018-06-26 RX ADMIN — SODIUM CHLORIDE 125 MILLILITER(S): 9 INJECTION INTRAMUSCULAR; INTRAVENOUS; SUBCUTANEOUS at 06:57

## 2018-06-26 RX ADMIN — Medication 100 MILLIGRAM(S): at 13:03

## 2018-06-26 RX ADMIN — POLYETHYLENE GLYCOL 3350 17 GRAM(S): 17 POWDER, FOR SOLUTION ORAL at 10:56

## 2018-06-26 NOTE — PROGRESS NOTE ADULT - SUBJECTIVE AND OBJECTIVE BOX
INTERVAL HPI/OVERNIGHT EVENTS:  pt seen and examined  reports mild improvement in abd pain, no n/v, no bm +gas  labs noted afebrile overnight    MEDICATIONS  (STANDING):  amylase/lipase/protease  (CREON 12,000 Units) 5 Capsule(s) Oral three times a day  ATENolol  Tablet 25 milliGRAM(s) Oral daily  dextrose 5%. 1000 milliLiter(s) (50 mL/Hr) IV Continuous <Continuous>  dextrose 50% Injectable 12.5 Gram(s) IV Push once  famotidine  IVPB 20 milliGRAM(s) IV Intermittent two times a day  fenofibrate Tablet 145 milliGRAM(s) Oral daily  insulin lispro (HumaLOG) corrective regimen sliding scale   SubCutaneous three times a day before meals  polyethylene glycol 3350 17 Gram(s) Oral once  sodium chloride 0.9%. 1000 milliLiter(s) (125 mL/Hr) IV Continuous <Continuous>    MEDICATIONS  (PRN):  acetaminophen   Tablet. 650 milliGRAM(s) Oral every 6 hours PRN Mild Pain (1 - 3)  ALPRAZolam 0.5 milliGRAM(s) Oral two times a day PRN anxiety  dextrose 40% Gel 15 Gram(s) Oral once PRN Blood Glucose LESS THAN 70 milliGRAM(s)/deciliter  glucagon  Injectable 1 milliGRAM(s) IntraMuscular once PRN Glucose LESS THAN 70 milligrams/deciliter  metoclopramide Injectable 10 milliGRAM(s) IV Push every 6 hours PRN Nausea and/or Vomiting  morphine  - Injectable 2 milliGRAM(s) IV Push every 6 hours PRN Moderate Pain (4 - 6)  ondansetron Injectable 4 milliGRAM(s) IV Push every 6 hours PRN Nausea      Allergies    codeine (Unknown)    Intolerances        Review of Systems:    General:  No wt loss, fevers, chills, night sweats, fatigue   Eyes:  Good vision, no reported pain  ENT:  No sore throat, pain, runny nose, dysphagia  CV:  No pain, palpitations, hypo/hypertension  Resp:  No dyspnea, cough, tachypnea, wheezing  GI:  +pain, No nausea, No vomiting, No diarrhea, +constipation, No weight loss, No fever, No pruritis, No rectal bleeding, No melena, No dysphagia  :  No pain, bleeding, incontinence, nocturia  Muscle:  No pain, weakness  Neuro:  No weakness, tingling, memory problems  Psych:  No fatigue, insomnia, mood problems, depression  Endocrine:  No polyuria, polydypsia, cold/heat intolerance  Heme:  No petechiae, ecchymosis, easy bruisability  Skin:  No rash, tattoos, scars, edema      Vital Signs Last 24 Hrs  T(C): 36.5 (26 Jun 2018 07:29), Max: 37.7 (25 Jun 2018 19:56)  T(F): 97.7 (26 Jun 2018 07:29), Max: 99.8 (25 Jun 2018 19:56)  HR: 83 (26 Jun 2018 07:29) (73 - 86)  BP: 134/86 (26 Jun 2018 07:29) (117/76 - 135/83)  BP(mean): --  RR: 20 (26 Jun 2018 07:29) (18 - 20)  SpO2: 95% (26 Jun 2018 07:29) (95% - 98%)    PHYSICAL EXAM:    General:  lying in bed in nad  HEENT:  NC/AT,  conjunctivae clear and pink  Chest:  dec bs  Cardiovascular:  Regular rhythm, S1, S2  Abdomen:  Soft, mild ttp ruq/epigastric region,, +dt, normoactive bowel sounds  Extremities:  no edema  Skin:  No rash  Neuro/Psych:  A&O  x3      LABS:                        11.7   17.04 )-----------( 240      ( 26 Jun 2018 07:21 )             35.6     06-26    140  |  108  |  9   ----------------------------<  141<H>  3.6   |  25  |  0.52    Ca    8.8      26 Jun 2018 07:21    TPro  7.1  /  Alb  2.9<L>  /  TBili  0.5  /  DBili  x   /  AST  13<L>  /  ALT  26  /  AlkPhos  86  06-26          RADIOLOGY & ADDITIONAL TESTS:

## 2018-06-26 NOTE — PROGRESS NOTE ADULT - PROBLEM SELECTOR PLAN 1
abd pain slowly improving  chronic, suspected 2/2 hypertriglyceridemia  advance diet to low fat today as tolerated  cont fenofibrate, creon with meals  labs downtrending  pain control  zofran prn, pepcid qd  serial abd exams  monitor for bms  nutrition eval  etoh abstinence  needs close outpatient gi f/u

## 2018-06-26 NOTE — PROGRESS NOTE ADULT - ATTENDING COMMENTS
Advanced care planning was discussed with patient and family.  Advanced care planning forms were reviewed and discussed.  Risks, benefits and alternatives of gastroenterologic procedures were discussed in detail and all questions were answered.    30 minutes spent.
pt seen and examine see above - pt with hx recurrent pancreatitis in past with  new episode - npo , iv fluid , pain meds , fu lipase amylase in am . high tg on meds  , pending gi dr Rosa to see pt  . leucocytosis sec to stress reactive .  hx dm type2 controlled on insulin fu Accu-Chek.
pt seen and examine see above - pt with hx recurrent pancreatitis in past with  new episode sec to hypertriglyceridemias  - started on clear liquid diet  , iv fluid  decreased  rate , pain meds , -  lipase amylase   coming  down  repeat in am  . high tg on meds  , leucocytosis sec to stress reactive resolving  .  hx dm type2 controlled on insulin fu Accu-Chek. gi dr gregory to see pt today.

## 2018-06-26 NOTE — DIETITIAN INITIAL EVALUATION ADULT. - PROBLEM SELECTOR PLAN 1
Campo II score 9, pending pH  Trial NPO, IVF changed to LR  needs stat CT, awaiting PO oral contrast trial  GI consult Dr Rosa  Attribute to hypertiglyceridemia? Serum Ca unable to view due to grossly lipemic blood sample. Also consider DDx medication induced (indapamide)? Also idiopathic, biliary (although US not indicative)

## 2018-06-26 NOTE — DIETITIAN INITIAL EVALUATION ADULT. - NS AS NUTRI INTERV ED CONTENT
Nutrition relationship to health/disease/Purpose of the nutrition education/low fat, consistent carb diet./Recommended modifications/Priority modifications/Other (specify)

## 2018-06-26 NOTE — DIETITIAN INITIAL EVALUATION ADULT. - OTHER INFO
Pt states that she just started eating solid foods. Pt c/o constipation (miralax ordered). Pt states that she has not been following a healthy diet at home. Her diet has been high in fats and unhealthy carbs. Pt states that she does not drink ETOH. Blood sugars are recently higher than normal. Takes Metformin at home.

## 2018-06-27 NOTE — DISCUSSION/SUMMARY
[Home] : patient was discharged to home [Med Rec Performed] : med rec performed [Follow Up Appt with Provider within 7 days] : follow up appt with provider within 7 days [FreeTextEntry3] : patient was instructed to double up on her metformin

## 2018-07-02 ENCOUNTER — LABORATORY RESULT (OUTPATIENT)
Age: 58
End: 2018-07-02

## 2018-07-02 ENCOUNTER — APPOINTMENT (OUTPATIENT)
Dept: INTERNAL MEDICINE | Facility: CLINIC | Age: 58
End: 2018-07-02
Payer: COMMERCIAL

## 2018-07-02 VITALS
HEART RATE: 56 BPM | DIASTOLIC BLOOD PRESSURE: 76 MMHG | SYSTOLIC BLOOD PRESSURE: 120 MMHG | WEIGHT: 180 LBS | TEMPERATURE: 97.7 F | BODY MASS INDEX: 28.93 KG/M2 | OXYGEN SATURATION: 97 % | RESPIRATION RATE: 14 BRPM | HEIGHT: 66 IN

## 2018-07-02 LAB
BASOPHILS # BLD AUTO: 0.08 K/UL
BASOPHILS NFR BLD AUTO: 0.8 %
EOSINOPHIL # BLD AUTO: 0.25 K/UL
EOSINOPHIL NFR BLD AUTO: 2.5 %
HCT VFR BLD CALC: 40.4 %
HGB BLD-MCNC: 12.4 G/DL
IMM GRANULOCYTES NFR BLD AUTO: 0.6 %
LYMPHOCYTES # BLD AUTO: 2.57 K/UL
LYMPHOCYTES NFR BLD AUTO: 26 %
MAN DIFF?: NORMAL
MCHC RBC-ENTMCNC: 26.4 PG
MCHC RBC-ENTMCNC: 30.7 GM/DL
MCV RBC AUTO: 86.1 FL
MONOCYTES # BLD AUTO: 0.78 K/UL
MONOCYTES NFR BLD AUTO: 7.9 %
NEUTROPHILS # BLD AUTO: 6.16 K/UL
NEUTROPHILS NFR BLD AUTO: 62.2 %
PLATELET # BLD AUTO: 358 K/UL
RBC # BLD: 4.69 M/UL
RBC # FLD: 14.3 %
WBC # FLD AUTO: 9.9 K/UL

## 2018-07-02 PROCEDURE — 99496 TRANSJ CARE MGMT HIGH F2F 7D: CPT | Mod: 25

## 2018-07-02 PROCEDURE — 36415 COLL VENOUS BLD VENIPUNCTURE: CPT

## 2018-07-02 RX ORDER — PANCRELIPASE 60000; 12000; 38000 [USP'U]/1; [USP'U]/1; [USP'U]/1
12000-38000 CAPSULE, DELAYED RELEASE PELLETS ORAL
Qty: 450 | Refills: 5 | Status: ACTIVE | COMMUNITY

## 2018-07-02 NOTE — HISTORY OF PRESENT ILLNESS
[Post-hospitalization from ___ Hospital] : Post-hospitalization from [unfilled] Hospital [Admitted on: ___] : The patient was admitted on [unfilled] [Discharged on ___] : discharged on [unfilled] [FreeTextEntry2] : Admitted for Pancreatitis  had abdominal pains \par has elevated Triglycerides \par feels better today \par Metformin increased

## 2018-07-02 NOTE — PHYSICAL EXAM

## 2018-07-03 LAB
25(OH)D3 SERPL-MCNC: 39.8 NG/ML
ALBUMIN SERPL ELPH-MCNC: 4.3 G/DL
ALP BLD-CCNC: 85 U/L
ALT SERPL-CCNC: 34 U/L
AMYLASE/CREAT SERPL: 74 U/L
ANION GAP SERPL CALC-SCNC: 14 MMOL/L
AST SERPL-CCNC: 26 U/L
BILIRUB SERPL-MCNC: 0.3 MG/DL
BUN SERPL-MCNC: 27 MG/DL
CALCIUM SERPL-MCNC: 10.4 MG/DL
CHLORIDE SERPL-SCNC: 101 MMOL/L
CHOLEST SERPL-MCNC: 213 MG/DL
CHOLEST/HDLC SERPL: 8.5 RATIO
CK SERPL-CCNC: 40 U/L
CO2 SERPL-SCNC: 27 MMOL/L
CREAT SERPL-MCNC: 0.78 MG/DL
CREAT SPEC-SCNC: 103 MG/DL
GLUCOSE SERPL-MCNC: 129 MG/DL
HBA1C MFR BLD HPLC: 7.3 %
HDLC SERPL-MCNC: 25 MG/DL
LDLC SERPL CALC-MCNC: 129 MG/DL
LPL SERPL-CCNC: 89 U/L
MICROALBUMIN 24H UR DL<=1MG/L-MCNC: 1.2 MG/DL
MICROALBUMIN/CREAT 24H UR-RTO: 12 MG/G
POTASSIUM SERPL-SCNC: 4.3 MMOL/L
PROT SERPL-MCNC: 7.6 G/DL
SODIUM SERPL-SCNC: 142 MMOL/L
TRIGL SERPL-MCNC: 296 MG/DL
TSH SERPL-ACNC: 1.34 UIU/ML

## 2018-07-16 ENCOUNTER — RX RENEWAL (OUTPATIENT)
Age: 58
End: 2018-07-16

## 2018-07-30 ENCOUNTER — RX RENEWAL (OUTPATIENT)
Age: 58
End: 2018-07-30

## 2018-08-06 PROBLEM — K86.1 OTHER CHRONIC PANCREATITIS: Chronic | Status: ACTIVE | Noted: 2018-06-23

## 2018-08-14 ENCOUNTER — RX RENEWAL (OUTPATIENT)
Age: 58
End: 2018-08-14

## 2018-08-27 ENCOUNTER — RX RENEWAL (OUTPATIENT)
Age: 58
End: 2018-08-27

## 2018-08-28 ENCOUNTER — APPOINTMENT (OUTPATIENT)
Dept: INTERNAL MEDICINE | Facility: CLINIC | Age: 58
End: 2018-08-28
Payer: COMMERCIAL

## 2018-08-28 ENCOUNTER — LABORATORY RESULT (OUTPATIENT)
Age: 58
End: 2018-08-28

## 2018-08-28 ENCOUNTER — NON-APPOINTMENT (OUTPATIENT)
Age: 58
End: 2018-08-28

## 2018-08-28 VITALS
HEART RATE: 58 BPM | OXYGEN SATURATION: 98 % | SYSTOLIC BLOOD PRESSURE: 110 MMHG | RESPIRATION RATE: 14 BRPM | DIASTOLIC BLOOD PRESSURE: 80 MMHG | HEIGHT: 66 IN | WEIGHT: 165 LBS | BODY MASS INDEX: 26.52 KG/M2 | TEMPERATURE: 98.1 F

## 2018-08-28 PROCEDURE — 93000 ELECTROCARDIOGRAM COMPLETE: CPT

## 2018-08-28 PROCEDURE — 36415 COLL VENOUS BLD VENIPUNCTURE: CPT

## 2018-08-28 PROCEDURE — 99396 PREV VISIT EST AGE 40-64: CPT | Mod: 25

## 2018-08-28 RX ORDER — AZITHROMYCIN 250 MG/1
250 TABLET, FILM COATED ORAL
Qty: 1 | Refills: 0 | Status: DISCONTINUED | COMMUNITY
Start: 2017-09-07 | End: 2018-08-28

## 2018-08-28 RX ORDER — CEFUROXIME AXETIL 500 MG/1
500 TABLET ORAL
Qty: 14 | Refills: 0 | Status: DISCONTINUED | COMMUNITY
Start: 2017-11-08 | End: 2018-08-28

## 2018-08-28 NOTE — PHYSICAL EXAM
[No Acute Distress] : no acute distress [Well Nourished] : well nourished [Well Developed] : well developed [Well-Appearing] : well-appearing [Normal Voice/Communication] : normal voice/communication [Normal Sclera/Conjunctiva] : normal sclera/conjunctiva [PERRL] : pupils equal round and reactive to light [EOMI] : extraocular movements intact [Normal Outer Ear/Nose] : the outer ears and nose were normal in appearance [Normal Oropharynx] : the oropharynx was normal [Normal TMs] : both tympanic membranes were normal [No JVD] : no jugular venous distention [Supple] : supple [No Lymphadenopathy] : no lymphadenopathy [Thyroid Normal, No Nodules] : the thyroid was normal and there were no nodules present [No Respiratory Distress] : no respiratory distress  [Clear to Auscultation] : lungs were clear to auscultation bilaterally [No Accessory Muscle Use] : no accessory muscle use [Normal Rate] : normal rate  [Regular Rhythm] : with a regular rhythm [Normal S1, S2] : normal S1 and S2 [No Murmur] : no murmur heard [No Carotid Bruits] : no carotid bruits [No Abdominal Bruit] : a ~M bruit was not heard ~T in the abdomen [No Varicosities] : no varicosities [Pedal Pulses Present] : the pedal pulses are present [No Edema] : there was no peripheral edema [No Extremity Clubbing/Cyanosis] : no extremity clubbing/cyanosis [No Palpable Aorta] : no palpable aorta [Normal Appearance] : normal in appearance [No Axillary Lymphadenopathy] : no axillary lymphadenopathy [Soft] : abdomen soft [Non Tender] : non-tender [Non-distended] : non-distended [No Masses] : no abdominal mass palpated [No HSM] : no HSM [Normal Bowel Sounds] : normal bowel sounds [No Hernias] : no hernias [Normal Supraclavicular Nodes] : no supraclavicular lymphadenopathy [Normal Axillary Nodes] : no axillary lymphadenopathy [Normal Posterior Cervical Nodes] : no posterior cervical lymphadenopathy [Normal Anterior Cervical Nodes] : no anterior cervical lymphadenopathy [No CVA Tenderness] : no CVA  tenderness [No Spinal Tenderness] : no spinal tenderness [No Joint Swelling] : no joint swelling [Grossly Normal Strength/Tone] : grossly normal strength/tone [No Rash] : no rash [Normal Gait] : normal gait [Coordination Grossly Intact] : coordination grossly intact [No Focal Deficits] : no focal deficits [Deep Tendon Reflexes (DTR)] : deep tendon reflexes were 2+ and symmetric [Speech Grossly Normal] : speech grossly normal [Normal Affect] : the affect was normal [Alert and Oriented x3] : oriented to person, place, and time [Normal Mood] : the mood was normal [Normal Insight/Judgement] : insight and judgment were intact [Kyphosis] : no kyphosis [Scoliosis] : no scoliosis

## 2018-08-29 ENCOUNTER — MEDICATION RENEWAL (OUTPATIENT)
Age: 58
End: 2018-08-29

## 2018-08-29 LAB
25(OH)D3 SERPL-MCNC: 37.1 NG/ML
ALBUMIN SERPL ELPH-MCNC: 4.8 G/DL
ALP BLD-CCNC: 80 U/L
ALT SERPL-CCNC: 15 U/L
AMYLASE/CREAT SERPL: 54 U/L
ANION GAP SERPL CALC-SCNC: 16 MMOL/L
APPEARANCE: CLEAR
AST SERPL-CCNC: 15 U/L
BASOPHILS # BLD AUTO: 0.05 K/UL
BASOPHILS NFR BLD AUTO: 0.5 %
BILIRUB SERPL-MCNC: 0.2 MG/DL
BILIRUBIN URINE: NEGATIVE
BLOOD URINE: NEGATIVE
BUN SERPL-MCNC: 27 MG/DL
CALCIUM SERPL-MCNC: 10.4 MG/DL
CHLORIDE SERPL-SCNC: 102 MMOL/L
CHOLEST SERPL-MCNC: 195 MG/DL
CHOLEST/HDLC SERPL: 5.9 RATIO
CK SERPL-CCNC: 49 U/L
CO2 SERPL-SCNC: 28 MMOL/L
COLOR: YELLOW
CREAT SERPL-MCNC: 0.88 MG/DL
EOSINOPHIL # BLD AUTO: 0.16 K/UL
EOSINOPHIL NFR BLD AUTO: 1.6 %
GLUCOSE QUALITATIVE U: NEGATIVE MG/DL
GLUCOSE SERPL-MCNC: 122 MG/DL
HBA1C MFR BLD HPLC: 6.3 %
HCT VFR BLD CALC: 40.6 %
HDLC SERPL-MCNC: 33 MG/DL
HGB BLD-MCNC: 12.4 G/DL
IMM GRANULOCYTES NFR BLD AUTO: 0.3 %
KETONES URINE: NEGATIVE
LDLC SERPL CALC-MCNC: 110 MG/DL
LEUKOCYTE ESTERASE URINE: ABNORMAL
LPL SERPL-CCNC: 39 U/L
LYMPHOCYTES # BLD AUTO: 2.51 K/UL
LYMPHOCYTES NFR BLD AUTO: 25.5 %
MAN DIFF?: NORMAL
MCHC RBC-ENTMCNC: 26.2 PG
MCHC RBC-ENTMCNC: 30.5 GM/DL
MCV RBC AUTO: 85.8 FL
MONOCYTES # BLD AUTO: 0.78 K/UL
MONOCYTES NFR BLD AUTO: 7.9 %
NEUTROPHILS # BLD AUTO: 6.33 K/UL
NEUTROPHILS NFR BLD AUTO: 64.2 %
NITRITE URINE: NEGATIVE
PH URINE: 5.5
PLATELET # BLD AUTO: 319 K/UL
POTASSIUM SERPL-SCNC: 4 MMOL/L
PROT SERPL-MCNC: 7.6 G/DL
PROTEIN URINE: NEGATIVE MG/DL
RBC # BLD: 4.73 M/UL
RBC # FLD: 14.2 %
SODIUM SERPL-SCNC: 146 MMOL/L
SPECIFIC GRAVITY URINE: 1.02
TRIGL SERPL-MCNC: 258 MG/DL
TSH SERPL-ACNC: 1.44 UIU/ML
UROBILINOGEN URINE: NEGATIVE MG/DL
WBC # FLD AUTO: 9.86 K/UL

## 2018-09-11 NOTE — PROGRESS NOTE ADULT - PROBLEM/PLAN-1
"              After Visit Summary   9/11/2018    Aimee Higuera    MRN: [id number]           Patient Information     Date Of Birth          1964        Visit Information        Provider Department      9/11/2018 1:20 PM Margareth Mccain PA-C [facility]        Today's Diagnoses     Acute sinusitis with symptoms > 10 days    -  1       Follow-ups after your visit        Who to contact     Normal or non-critical lab and imaging results will be communicated to you by iSSimplehart, letter or phone within 4 business days after the clinic has received the results. If you do not hear from us within 7 days, please contact the clinic through iSSimplehart or phone. If you have a critical or abnormal lab result, we will notify you by phone as soon as possible.  Submit refill requests through OnKure or call your pharmacy and they will forward the refill request to us. Please allow 3 business days for your refill to be completed.          If you need to speak with a  for additional information , please call: [phone]             Additional Information About Your Visit        OnKure Information     OnKure gives you secure access to your electronic health record. If you see a primary care provider, you can also send messages to your care team and make appointments. If you have questions, please call your primary care clinic.  If you do not have a primary care provider, please call [phone] and they will assist you.        Care EveryWhere ID     This is your Care EveryWhere ID. This could be used by other organizations to access your [facility] medical records  [id number]        Your Vitals Were     Pulse Temperature Height Pulse Oximetry BMI (Body Mass Index)       76 98.7  F (37.1  C) (Tympanic) 5' 8.5\" (1.74 m) 100% 19.18 kg/m2        Blood Pressure from Last 3 Encounters:   09/11/18 129/82   07/20/16 98/71   06/04/15 125/78    Weight from Last 3 Encounters:   09/11/18 128 lb (58.1 kg) "   07/20/16 126 lb (57.2 kg)   06/04/15 126 lb (57.2 kg)              Today, you had the following     No orders found for display         Today's Medication Changes          These changes are accurate as of 9/11/18  1:47 PM.  If you have any questions, ask your nurse or doctor.               Start taking these medicines.        Dose/Directions    doxycycline 100 MG capsule   Commonly known as:  VIBRAMYCIN   Used for:  Acute sinusitis with symptoms > 10 days   Started by:  Margareth Mccain PA-C        Dose:  100 mg   Take 1 capsule (100 mg) by mouth 2 times daily   Quantity:  20 capsule   Refills:  0            Where to get your medicines      These medications were sent to [id number] - [place], [place] - [address]  [address], [facility] [address]    Hours:  test fax successfully sent 10/22/03  Phone:  [phone]     doxycycline 100 MG capsule                Primary Care Provider Office Phone # Fax #    Merari Argueta [phone] [address] [facility] [id number]        Equal Access to Services     [facility] AH: Hadii aad ku hadasho Soomaali, waaxda luqadaha, qaybta kaalmada adetiffany, sharmin oakley . So [facility] [phone].    ATENCIÓN: Si habla español, tiene a piper disposición servicios gratuitos de asistencia lingüística. [facility] [phone].    We comply with applicable federal civil rights laws and [place] laws. We do not discriminate on the basis of race, color, national origin, age, disability, sex, sexual orientation, or gender identity.            Thank you!     Thank you for choosing [facility]  for your care. Our goal is always to provide you with excellent care. Hearing back from our patients is one way we can continue to improve our services. Please take a few minutes to complete the written survey that you may receive in the mail after your visit with us. Thank you!             Your  Updated Medication List - Protect others around you: Learn how to safely use, store and throw away your medicines at www.disposemymeds.org.          This list is accurate as of 9/11/18  1:47 PM.  Always use your most recent med list.                   Brand Name Dispense Instructions for use Diagnosis    doxycycline 100 MG capsule    VIBRAMYCIN    20 capsule    Take 1 capsule (100 mg) by mouth 2 times daily    Acute sinusitis with symptoms > 10 days       MULTIVITAMIN PO           VITAMIN D PO              DISPLAY PLAN FREE TEXT

## 2018-09-13 ENCOUNTER — RX RENEWAL (OUTPATIENT)
Age: 58
End: 2018-09-13

## 2018-09-28 LAB — HEMOCCULT STL QL IA: NEGATIVE

## 2018-10-04 ENCOUNTER — APPOINTMENT (OUTPATIENT)
Dept: INTERNAL MEDICINE | Facility: CLINIC | Age: 58
End: 2018-10-04

## 2018-10-04 ENCOUNTER — RX RENEWAL (OUTPATIENT)
Age: 58
End: 2018-10-04

## 2018-10-10 ENCOUNTER — RX RENEWAL (OUTPATIENT)
Age: 58
End: 2018-10-10

## 2018-10-15 ENCOUNTER — RX RENEWAL (OUTPATIENT)
Age: 58
End: 2018-10-15

## 2018-10-16 ENCOUNTER — APPOINTMENT (OUTPATIENT)
Dept: INTERNAL MEDICINE | Facility: CLINIC | Age: 58
End: 2018-10-16
Payer: COMMERCIAL

## 2018-10-16 VITALS
SYSTOLIC BLOOD PRESSURE: 150 MMHG | HEART RATE: 73 BPM | OXYGEN SATURATION: 97 % | DIASTOLIC BLOOD PRESSURE: 80 MMHG | HEIGHT: 66 IN | BODY MASS INDEX: 26.52 KG/M2 | RESPIRATION RATE: 14 BRPM | WEIGHT: 165 LBS | TEMPERATURE: 97.9 F

## 2018-10-16 VITALS — DIASTOLIC BLOOD PRESSURE: 78 MMHG | SYSTOLIC BLOOD PRESSURE: 130 MMHG

## 2018-10-16 PROCEDURE — 99214 OFFICE O/P EST MOD 30 MIN: CPT

## 2018-10-16 RX ORDER — TOBRAMYCIN 3 MG/ML
0.3 SOLUTION/ DROPS OPHTHALMIC EVERY 4 HOURS
Qty: 5 | Refills: 0 | Status: DISCONTINUED | COMMUNITY
Start: 2017-11-13 | End: 2018-10-16

## 2018-10-16 RX ORDER — AZITHROMYCIN DIHYDRATE 250 MG/1
250 TABLET, FILM COATED ORAL
Qty: 1 | Refills: 0 | Status: DISCONTINUED | COMMUNITY
Start: 2018-05-01 | End: 2018-10-16

## 2018-10-16 NOTE — PHYSICAL EXAM

## 2018-10-22 ENCOUNTER — APPOINTMENT (OUTPATIENT)
Dept: INTERNAL MEDICINE | Facility: CLINIC | Age: 58
End: 2018-10-22

## 2018-11-02 ENCOUNTER — RX RENEWAL (OUTPATIENT)
Age: 58
End: 2018-11-02

## 2018-11-02 RX ORDER — LANCETS 33 GAUGE
EACH MISCELLANEOUS
Qty: 100 | Refills: 1 | Status: ACTIVE | COMMUNITY
Start: 2018-02-09 | End: 1900-01-01

## 2018-11-13 ENCOUNTER — RX RENEWAL (OUTPATIENT)
Age: 58
End: 2018-11-13

## 2018-11-16 ENCOUNTER — RX RENEWAL (OUTPATIENT)
Age: 58
End: 2018-11-16

## 2018-11-17 ENCOUNTER — RX RENEWAL (OUTPATIENT)
Age: 58
End: 2018-11-17

## 2018-11-20 ENCOUNTER — APPOINTMENT (OUTPATIENT)
Dept: FAMILY MEDICINE | Facility: CLINIC | Age: 58
End: 2018-11-20
Payer: COMMERCIAL

## 2018-11-20 VITALS
BODY MASS INDEX: 26.52 KG/M2 | DIASTOLIC BLOOD PRESSURE: 88 MMHG | RESPIRATION RATE: 14 BRPM | SYSTOLIC BLOOD PRESSURE: 142 MMHG | WEIGHT: 165 LBS | TEMPERATURE: 97.8 F | OXYGEN SATURATION: 97 % | HEART RATE: 67 BPM | HEIGHT: 66 IN

## 2018-11-20 PROCEDURE — 99213 OFFICE O/P EST LOW 20 MIN: CPT

## 2018-11-20 RX ORDER — CEFUROXIME AXETIL 500 MG/1
500 TABLET ORAL
Qty: 20 | Refills: 0 | Status: DISCONTINUED | COMMUNITY
Start: 2018-10-16 | End: 2018-11-20

## 2018-11-20 NOTE — HISTORY OF PRESENT ILLNESS
[FreeTextEntry8] : Coming in with complaint of neck pain and LBP pain for several years. Has had MRIs done that show several disc herniations. Also experiencing numbness and tingling around b/l traps and down both legs but worse on right side. Exacerbated by being in any position for too long including standling, walking, sitting. Has appt with douglas on december 5th but is experiencing a lot of pain despite using meloxicam, icy hot patches, and tens unit.\par

## 2018-11-20 NOTE — HEALTH RISK ASSESSMENT
[Patient reported mammogram was normal] : Patient reported mammogram was normal [Patient reported PAP Smear was normal] : Patient reported PAP Smear was normal [Patient reported colonoscopy was normal] : Patient reported colonoscopy was normal [MammogramDate] : 10/17 [PapSmearDate] : 10/17 [ColonoscopyDate] : 08/15

## 2018-11-20 NOTE — PLAN
[FreeTextEntry1] : -advised that flexeril may make her drowsy, avoid using with her sedating meds such as xanax\par -follow-up with ortho as scheduled

## 2018-12-12 ENCOUNTER — RX RENEWAL (OUTPATIENT)
Age: 58
End: 2018-12-12

## 2019-01-11 ENCOUNTER — RX RENEWAL (OUTPATIENT)
Age: 59
End: 2019-01-11

## 2019-02-12 ENCOUNTER — RX RENEWAL (OUTPATIENT)
Age: 59
End: 2019-02-12

## 2019-02-26 ENCOUNTER — RX RENEWAL (OUTPATIENT)
Age: 59
End: 2019-02-26

## 2019-03-04 ENCOUNTER — RX RENEWAL (OUTPATIENT)
Age: 59
End: 2019-03-04

## 2019-03-13 ENCOUNTER — RX RENEWAL (OUTPATIENT)
Age: 59
End: 2019-03-13

## 2019-03-22 ENCOUNTER — APPOINTMENT (OUTPATIENT)
Dept: INTERNAL MEDICINE | Facility: CLINIC | Age: 59
End: 2019-03-22
Payer: COMMERCIAL

## 2019-03-22 VITALS
SYSTOLIC BLOOD PRESSURE: 120 MMHG | HEIGHT: 66 IN | TEMPERATURE: 98 F | OXYGEN SATURATION: 97 % | HEART RATE: 69 BPM | DIASTOLIC BLOOD PRESSURE: 70 MMHG | RESPIRATION RATE: 14 BRPM

## 2019-03-22 DIAGNOSIS — F41.9 ANXIETY DISORDER, UNSPECIFIED: ICD-10-CM

## 2019-03-22 PROCEDURE — 36415 COLL VENOUS BLD VENIPUNCTURE: CPT

## 2019-03-22 PROCEDURE — 99214 OFFICE O/P EST MOD 30 MIN: CPT | Mod: 25

## 2019-03-22 NOTE — HISTORY OF PRESENT ILLNESS
[FreeTextEntry1] : Here for follow up for her HTN and cholesterol and diabetes\par needs renewal of medications

## 2019-03-24 LAB
25(OH)D3 SERPL-MCNC: 36.1 NG/ML
ALBUMIN SERPL ELPH-MCNC: 4.3 G/DL
ALP BLD-CCNC: 74 U/L
ALT SERPL-CCNC: 18 U/L
AMYLASE/CREAT SERPL: 53 U/L
ANION GAP SERPL CALC-SCNC: 14 MMOL/L
AST SERPL-CCNC: 14 U/L
BASOPHILS # BLD AUTO: 0.08 K/UL
BASOPHILS NFR BLD AUTO: 0.8 %
BILIRUB SERPL-MCNC: 0.2 MG/DL
BUN SERPL-MCNC: 23 MG/DL
CALCIUM SERPL-MCNC: 10 MG/DL
CHLORIDE SERPL-SCNC: 105 MMOL/L
CHOLEST SERPL-MCNC: 179 MG/DL
CHOLEST/HDLC SERPL: 5.8 RATIO
CK SERPL-CCNC: 62 U/L
CO2 SERPL-SCNC: 25 MMOL/L
CREAT SERPL-MCNC: 0.63 MG/DL
EOSINOPHIL # BLD AUTO: 0.2 K/UL
EOSINOPHIL NFR BLD AUTO: 2 %
ESTIMATED AVERAGE GLUCOSE: 134 MG/DL
GLUCOSE SERPL-MCNC: 114 MG/DL
HBA1C MFR BLD HPLC: 6.3 %
HCT VFR BLD CALC: 41.5 %
HDLC SERPL-MCNC: 31 MG/DL
HGB BLD-MCNC: 12.9 G/DL
IMM GRANULOCYTES NFR BLD AUTO: 0.3 %
LDLC SERPL CALC-MCNC: 81 MG/DL
LPL SERPL-CCNC: 40 U/L
LYMPHOCYTES # BLD AUTO: 2.57 K/UL
LYMPHOCYTES NFR BLD AUTO: 26.1 %
MAN DIFF?: NORMAL
MCHC RBC-ENTMCNC: 27.7 PG
MCHC RBC-ENTMCNC: 31.1 GM/DL
MCV RBC AUTO: 89.1 FL
MONOCYTES # BLD AUTO: 0.79 K/UL
MONOCYTES NFR BLD AUTO: 8 %
NEUTROPHILS # BLD AUTO: 6.16 K/UL
NEUTROPHILS NFR BLD AUTO: 62.8 %
PLATELET # BLD AUTO: 310 K/UL
POTASSIUM SERPL-SCNC: 4.1 MMOL/L
PROT SERPL-MCNC: 6.7 G/DL
RBC # BLD: 4.66 M/UL
RBC # FLD: 14.1 %
SODIUM SERPL-SCNC: 144 MMOL/L
TRIGL SERPL-MCNC: 333 MG/DL
TSH SERPL-ACNC: 1.15 UIU/ML
WBC # FLD AUTO: 9.83 K/UL

## 2019-03-26 ENCOUNTER — RX RENEWAL (OUTPATIENT)
Age: 59
End: 2019-03-26

## 2019-04-11 ENCOUNTER — APPOINTMENT (OUTPATIENT)
Dept: FAMILY MEDICINE | Facility: CLINIC | Age: 59
End: 2019-04-11
Payer: COMMERCIAL

## 2019-04-11 VITALS
WEIGHT: 165 LBS | SYSTOLIC BLOOD PRESSURE: 126 MMHG | BODY MASS INDEX: 26.63 KG/M2 | RESPIRATION RATE: 14 BRPM | DIASTOLIC BLOOD PRESSURE: 70 MMHG | HEART RATE: 68 BPM | OXYGEN SATURATION: 97 % | TEMPERATURE: 98.3 F

## 2019-04-11 PROCEDURE — 99213 OFFICE O/P EST LOW 20 MIN: CPT

## 2019-04-11 RX ORDER — PRAMOXINE HYDROCHLORIDE AND HYDROCORTISONE ACETATE 10; 25 MG/G; MG/G
2.5-1 CREAM TOPICAL
Qty: 30 | Refills: 0 | Status: DISCONTINUED | COMMUNITY
Start: 2019-01-14

## 2019-04-11 RX ORDER — AMOXICILLIN 500 MG/1
500 CAPSULE ORAL
Qty: 21 | Refills: 0 | Status: DISCONTINUED | COMMUNITY
Start: 2018-12-22

## 2019-04-11 RX ORDER — METAXALONE 800 MG/1
800 TABLET ORAL
Qty: 60 | Refills: 0 | Status: DISCONTINUED | COMMUNITY
Start: 2019-01-02

## 2019-04-11 RX ORDER — PANCRELIPASE 120000; 24000; 76000 [USP'U]/1; [USP'U]/1; [USP'U]/1
24000-76000 CAPSULE, DELAYED RELEASE PELLETS ORAL
Qty: 240 | Refills: 0 | Status: DISCONTINUED | COMMUNITY
Start: 2018-07-30

## 2019-04-11 NOTE — PLAN
[FreeTextEntry1] : -heating pad, PT, given name of neurosurgeon as pt would like second opinion regarding surgery

## 2019-04-11 NOTE — HISTORY OF PRESENT ILLNESS
[FreeTextEntry8] : Pt presenting with lumbar and cervical region pain and spasm. Pt has been packing for move which has exacerbated her chronic pain. She has multiple disc herniations in both regions. Seeing ortho who has recommended surgery. She received cortisone shots which did not help. Also taking meloxicam.

## 2019-04-11 NOTE — PHYSICAL EXAM
[Well Nourished] : well nourished [No Acute Distress] : no acute distress [Well Developed] : well developed [Well-Appearing] : well-appearing [Normal Sclera/Conjunctiva] : normal sclera/conjunctiva [EOMI] : extraocular movements intact [PERRL] : pupils equal round and reactive to light [Normal Oropharynx] : the oropharynx was normal [Normal Outer Ear/Nose] : the outer ears and nose were normal in appearance [No JVD] : no jugular venous distention [Supple] : supple [No Lymphadenopathy] : no lymphadenopathy [No Respiratory Distress] : no respiratory distress  [Thyroid Normal, No Nodules] : the thyroid was normal and there were no nodules present [Clear to Auscultation] : lungs were clear to auscultation bilaterally [No Accessory Muscle Use] : no accessory muscle use [Normal Rate] : normal rate  [Regular Rhythm] : with a regular rhythm [Normal S1, S2] : normal S1 and S2 [No Murmur] : no murmur heard [No Carotid Bruits] : no carotid bruits [No Abdominal Bruit] : a ~M bruit was not heard ~T in the abdomen [No Varicosities] : no varicosities [Pedal Pulses Present] : the pedal pulses are present [No Edema] : there was no peripheral edema [No Extremity Clubbing/Cyanosis] : no extremity clubbing/cyanosis [No Palpable Aorta] : no palpable aorta [Soft] : abdomen soft [Non Tender] : non-tender [No Masses] : no abdominal mass palpated [Non-distended] : non-distended [Normal Bowel Sounds] : normal bowel sounds [No HSM] : no HSM [Normal Posterior Cervical Nodes] : no posterior cervical lymphadenopathy [Normal Anterior Cervical Nodes] : no anterior cervical lymphadenopathy [No CVA Tenderness] : no CVA  tenderness [No Spinal Tenderness] : no spinal tenderness [No Joint Swelling] : no joint swelling [Grossly Normal Strength/Tone] : grossly normal strength/tone [No Rash] : no rash [Coordination Grossly Intact] : coordination grossly intact [Normal Gait] : normal gait [No Focal Deficits] : no focal deficits [Normal Affect] : the affect was normal [Deep Tendon Reflexes (DTR)] : deep tendon reflexes were 2+ and symmetric [Normal Insight/Judgement] : insight and judgment were intact [de-identified] : significant hypertonicity and spasm of paraspinal muscles in lumbar and cervical region

## 2019-04-17 ENCOUNTER — RX RENEWAL (OUTPATIENT)
Age: 59
End: 2019-04-17

## 2019-04-23 ENCOUNTER — RX RENEWAL (OUTPATIENT)
Age: 59
End: 2019-04-23

## 2019-05-21 ENCOUNTER — RX RENEWAL (OUTPATIENT)
Age: 59
End: 2019-05-21

## 2019-05-29 ENCOUNTER — MEDICATION RENEWAL (OUTPATIENT)
Age: 59
End: 2019-05-29

## 2019-06-17 ENCOUNTER — RX RENEWAL (OUTPATIENT)
Age: 59
End: 2019-06-17

## 2019-06-26 ENCOUNTER — APPOINTMENT (OUTPATIENT)
Dept: INTERNAL MEDICINE | Facility: CLINIC | Age: 59
End: 2019-06-26
Payer: COMMERCIAL

## 2019-06-26 VITALS — DIASTOLIC BLOOD PRESSURE: 80 MMHG | SYSTOLIC BLOOD PRESSURE: 130 MMHG

## 2019-06-26 VITALS
DIASTOLIC BLOOD PRESSURE: 76 MMHG | RESPIRATION RATE: 14 BRPM | TEMPERATURE: 97.4 F | HEIGHT: 66 IN | HEART RATE: 97 BPM | WEIGHT: 165 LBS | OXYGEN SATURATION: 97 % | SYSTOLIC BLOOD PRESSURE: 142 MMHG | BODY MASS INDEX: 26.52 KG/M2

## 2019-06-26 PROCEDURE — 99214 OFFICE O/P EST MOD 30 MIN: CPT

## 2019-06-26 NOTE — HISTORY OF PRESENT ILLNESS
[___ Days ago] : [unfilled] days ago [Moderate] : moderate [Congestion] : congestion [Episodic] : episodic  [Sore Throat] : sore throat [Headache] : headache [Worsening] : worsening [Cough] : no cough [Wheezing] : no wheezing [Chills] : no chills [Shortness Of Breath] : no shortness of breath [Anorexia] : no anorexia [Fatigue] : not fatigue [FreeTextEntry8] : Head congestion [Fever] : no fever

## 2019-06-26 NOTE — PHYSICAL EXAM
[No Acute Distress] : no acute distress [Well Nourished] : well nourished [Well Developed] : well developed [Well-Appearing] : well-appearing [Normal Voice/Communication] : normal voice/communication [Normal Sclera/Conjunctiva] : normal sclera/conjunctiva [PERRL] : pupils equal round and reactive to light [EOMI] : extraocular movements intact [Normal Outer Ear/Nose] : the outer ears and nose were normal in appearance [Normal Oropharynx] : the oropharynx was normal [No JVD] : no jugular venous distention [Supple] : supple [No Lymphadenopathy] : no lymphadenopathy [Thyroid Normal, No Nodules] : the thyroid was normal and there were no nodules present [No Respiratory Distress] : no respiratory distress  [Clear to Auscultation] : lungs were clear to auscultation bilaterally [No Accessory Muscle Use] : no accessory muscle use [Normal Rate] : normal rate  [Regular Rhythm] : with a regular rhythm [Normal S1, S2] : normal S1 and S2 [No Murmur] : no murmur heard [No Carotid Bruits] : no carotid bruits [No Abdominal Bruit] : a ~M bruit was not heard ~T in the abdomen [No Varicosities] : no varicosities [Pedal Pulses Present] : the pedal pulses are present [No Edema] : there was no peripheral edema [No Extremity Clubbing/Cyanosis] : no extremity clubbing/cyanosis [No Palpable Aorta] : no palpable aorta [Soft] : abdomen soft [Non-distended] : non-distended [Non Tender] : non-tender [No Masses] : no abdominal mass palpated [No HSM] : no HSM [Normal Bowel Sounds] : normal bowel sounds [Normal Posterior Cervical Nodes] : no posterior cervical lymphadenopathy [Normal Anterior Cervical Nodes] : no anterior cervical lymphadenopathy [No CVA Tenderness] : no CVA  tenderness [No Spinal Tenderness] : no spinal tenderness [No Joint Swelling] : no joint swelling [Grossly Normal Strength/Tone] : grossly normal strength/tone [No Rash] : no rash [Normal Gait] : normal gait [Coordination Grossly Intact] : coordination grossly intact [No Focal Deficits] : no focal deficits [Speech Grossly Normal] : speech grossly normal [Deep Tendon Reflexes (DTR)] : deep tendon reflexes were 2+ and symmetric [Normal Affect] : the affect was normal [Memory Grossly Normal] : memory grossly normal [Alert and Oriented x3] : oriented to person, place, and time [Normal Mood] : the mood was normal [Normal Insight/Judgement] : insight and judgment were intact

## 2019-07-22 ENCOUNTER — RX RENEWAL (OUTPATIENT)
Age: 59
End: 2019-07-22

## 2019-08-01 ENCOUNTER — APPOINTMENT (OUTPATIENT)
Dept: DERMATOLOGY | Facility: CLINIC | Age: 59
End: 2019-08-01
Payer: COMMERCIAL

## 2019-08-01 PROCEDURE — 99213 OFFICE O/P EST LOW 20 MIN: CPT

## 2019-08-01 NOTE — HISTORY OF PRESENT ILLNESS
[FreeTextEntry1] : growth behind the L ear [de-identified] : 58F here for growth behind the L ear. Noted about 2 months ago as a red mildly tender bump. Used OTC cream with resolution. Wanted to ensure it wasn't cancerous. Otherwise, no new, evolving, or symptomatic skin lesions.

## 2019-08-01 NOTE — PHYSICAL EXAM
[Alert] : alert [Oriented x 3] : ~L oriented x 3 [Well Nourished] : well nourished [Conjunctiva Non-injected] : conjunctiva non-injected [No Visual Lymphadenopathy] : no visual  lymphadenopathy [No Clubbing] : no clubbing [No Bromhidrosis] : no bromhidrosis [No Edema] : no edema [No Chromhidrosis] : no chromhidrosis [FreeTextEntry3] : No appreciable lesion behind the L ear

## 2019-08-22 ENCOUNTER — RX RENEWAL (OUTPATIENT)
Age: 59
End: 2019-08-22

## 2019-09-12 ENCOUNTER — RX RENEWAL (OUTPATIENT)
Age: 59
End: 2019-09-12

## 2019-09-20 ENCOUNTER — RX RENEWAL (OUTPATIENT)
Age: 59
End: 2019-09-20

## 2019-10-02 ENCOUNTER — APPOINTMENT (OUTPATIENT)
Dept: INTERNAL MEDICINE | Facility: CLINIC | Age: 59
End: 2019-10-02
Payer: COMMERCIAL

## 2019-10-02 VITALS
HEIGHT: 66 IN | RESPIRATION RATE: 14 BRPM | TEMPERATURE: 98.1 F | DIASTOLIC BLOOD PRESSURE: 80 MMHG | SYSTOLIC BLOOD PRESSURE: 130 MMHG | HEART RATE: 81 BPM | OXYGEN SATURATION: 98 %

## 2019-10-02 DIAGNOSIS — A08.4 VIRAL INTESTINAL INFECTION, UNSPECIFIED: ICD-10-CM

## 2019-10-02 DIAGNOSIS — Z87.09 PERSONAL HISTORY OF OTHER DISEASES OF THE RESPIRATORY SYSTEM: ICD-10-CM

## 2019-10-02 PROCEDURE — 99214 OFFICE O/P EST MOD 30 MIN: CPT

## 2019-10-02 NOTE — PHYSICAL EXAM
[No Acute Distress] : no acute distress [Well Nourished] : well nourished [Well Developed] : well developed [Well-Appearing] : well-appearing [Normal Voice/Communication] : normal voice/communication [Normal Sclera/Conjunctiva] : normal sclera/conjunctiva [PERRL] : pupils equal round and reactive to light [EOMI] : extraocular movements intact [Normal Oropharynx] : the oropharynx was normal [Normal Outer Ear/Nose] : the outer ears and nose were normal in appearance [No JVD] : no jugular venous distention [No Lymphadenopathy] : no lymphadenopathy [Supple] : supple [Thyroid Normal, No Nodules] : the thyroid was normal and there were no nodules present [No Respiratory Distress] : no respiratory distress  [Clear to Auscultation] : lungs were clear to auscultation bilaterally [No Accessory Muscle Use] : no accessory muscle use [Normal Rate] : normal rate  [Regular Rhythm] : with a regular rhythm [Normal S1, S2] : normal S1 and S2 [No Murmur] : no murmur heard [No Carotid Bruits] : no carotid bruits [No Abdominal Bruit] : a ~M bruit was not heard ~T in the abdomen [No Varicosities] : no varicosities [No Edema] : there was no peripheral edema [Pedal Pulses Present] : the pedal pulses are present [No Palpable Aorta] : no palpable aorta [No Extremity Clubbing/Cyanosis] : no extremity clubbing/cyanosis [Soft] : abdomen soft [Non Tender] : non-tender [Non-distended] : non-distended [No HSM] : no HSM [No Masses] : no abdominal mass palpated [Normal Bowel Sounds] : normal bowel sounds [Normal Posterior Cervical Nodes] : no posterior cervical lymphadenopathy [Normal Anterior Cervical Nodes] : no anterior cervical lymphadenopathy [No CVA Tenderness] : no CVA  tenderness [No Spinal Tenderness] : no spinal tenderness [No Joint Swelling] : no joint swelling [No Rash] : no rash [Grossly Normal Strength/Tone] : grossly normal strength/tone [Coordination Grossly Intact] : coordination grossly intact [No Focal Deficits] : no focal deficits [Normal Gait] : normal gait [Speech Grossly Normal] : speech grossly normal [Deep Tendon Reflexes (DTR)] : deep tendon reflexes were 2+ and symmetric [Memory Grossly Normal] : memory grossly normal [Normal Affect] : the affect was normal [Normal Mood] : the mood was normal [Normal Insight/Judgement] : insight and judgment were intact

## 2019-10-02 NOTE — REVIEW OF SYSTEMS
[Sore Throat] : sore throat [Diarrhea] : diarrhea [Negative] : Heme/Lymph [Earache] : no earache [Nasal Discharge] : no nasal discharge

## 2019-10-25 ENCOUNTER — RX RENEWAL (OUTPATIENT)
Age: 59
End: 2019-10-25

## 2019-11-05 ENCOUNTER — APPOINTMENT (OUTPATIENT)
Dept: INTERNAL MEDICINE | Facility: CLINIC | Age: 59
End: 2019-11-05

## 2019-11-19 ENCOUNTER — APPOINTMENT (OUTPATIENT)
Dept: INTERNAL MEDICINE | Facility: CLINIC | Age: 59
End: 2019-11-19
Payer: COMMERCIAL

## 2019-11-19 VITALS
BODY MASS INDEX: 27.64 KG/M2 | RESPIRATION RATE: 14 BRPM | HEIGHT: 66 IN | WEIGHT: 172 LBS | DIASTOLIC BLOOD PRESSURE: 76 MMHG | OXYGEN SATURATION: 97 % | SYSTOLIC BLOOD PRESSURE: 132 MMHG | HEART RATE: 86 BPM | TEMPERATURE: 98.5 F

## 2019-11-19 PROCEDURE — 93000 ELECTROCARDIOGRAM COMPLETE: CPT

## 2019-11-19 PROCEDURE — 36415 COLL VENOUS BLD VENIPUNCTURE: CPT

## 2019-11-19 PROCEDURE — 99396 PREV VISIT EST AGE 40-64: CPT | Mod: 25

## 2019-11-19 NOTE — PHYSICAL EXAM
[No Acute Distress] : no acute distress [Well Nourished] : well nourished [Well Developed] : well developed [Well-Appearing] : well-appearing [Normal Voice/Communication] : normal voice/communication [Normal Sclera/Conjunctiva] : normal sclera/conjunctiva [EOMI] : extraocular movements intact [PERRL] : pupils equal round and reactive to light [Normal Outer Ear/Nose] : the outer ears and nose were normal in appearance [Normal Oropharynx] : the oropharynx was normal [Normal TMs] : both tympanic membranes were normal [Supple] : supple [No Lymphadenopathy] : no lymphadenopathy [No JVD] : no jugular venous distention [Thyroid Normal, No Nodules] : the thyroid was normal and there were no nodules present [No Accessory Muscle Use] : no accessory muscle use [No Respiratory Distress] : no respiratory distress  [Regular Rhythm] : with a regular rhythm [Normal Rate] : normal rate  [Clear to Auscultation] : lungs were clear to auscultation bilaterally [Normal S1, S2] : normal S1 and S2 [No Murmur] : no murmur heard [No Carotid Bruits] : no carotid bruits [No Abdominal Bruit] : a ~M bruit was not heard ~T in the abdomen [No Edema] : there was no peripheral edema [Pedal Pulses Present] : the pedal pulses are present [No Varicosities] : no varicosities [Normal Appearance] : normal in appearance [No Palpable Aorta] : no palpable aorta [No Extremity Clubbing/Cyanosis] : no extremity clubbing/cyanosis [Soft] : abdomen soft [Non Tender] : non-tender [Non-distended] : non-distended [No Masses] : no abdominal mass palpated [No HSM] : no HSM [Normal Bowel Sounds] : normal bowel sounds [Normal Supraclavicular Nodes] : no supraclavicular lymphadenopathy [Normal Axillary Nodes] : no axillary lymphadenopathy [Normal Anterior Cervical Nodes] : no anterior cervical lymphadenopathy [Normal Posterior Cervical Nodes] : no posterior cervical lymphadenopathy [No Spinal Tenderness] : no spinal tenderness [No CVA Tenderness] : no CVA  tenderness [No Joint Swelling] : no joint swelling [Grossly Normal Strength/Tone] : grossly normal strength/tone [No Rash] : no rash [Coordination Grossly Intact] : coordination grossly intact [No Focal Deficits] : no focal deficits [Normal Gait] : normal gait [Deep Tendon Reflexes (DTR)] : deep tendon reflexes were 2+ and symmetric [Speech Grossly Normal] : speech grossly normal [Memory Grossly Normal] : memory grossly normal [Normal Affect] : the affect was normal [Normal Mood] : the mood was normal [Alert and Oriented x3] : oriented to person, place, and time [Normal Insight/Judgement] : insight and judgment were intact

## 2019-11-20 ENCOUNTER — MED ADMIN CHARGE (OUTPATIENT)
Age: 59
End: 2019-11-20

## 2019-11-21 LAB
25(OH)D3 SERPL-MCNC: 36.6 NG/ML
ALBUMIN SERPL ELPH-MCNC: 4.8 G/DL
ALP BLD-CCNC: 81 U/L
ALT SERPL-CCNC: 42 U/L
AMYLASE/CREAT SERPL: 46 U/L
ANION GAP SERPL CALC-SCNC: 15 MMOL/L
APPEARANCE: CLEAR
AST SERPL-CCNC: 28 U/L
BASOPHILS # BLD AUTO: 0.08 K/UL
BASOPHILS NFR BLD AUTO: 0.7 %
BILIRUB SERPL-MCNC: 0.3 MG/DL
BILIRUBIN URINE: NEGATIVE
BLOOD URINE: NEGATIVE
BUN SERPL-MCNC: 18 MG/DL
CALCIUM SERPL-MCNC: 10.6 MG/DL
CHLORIDE SERPL-SCNC: 100 MMOL/L
CHOLEST SERPL-MCNC: 194 MG/DL
CHOLEST/HDLC SERPL: 6.1 RATIO
CK SERPL-CCNC: 54 U/L
CO2 SERPL-SCNC: 27 MMOL/L
COLOR: NORMAL
CREAT SERPL-MCNC: 0.64 MG/DL
CREAT SPEC-SCNC: 40 MG/DL
EOSINOPHIL # BLD AUTO: 0.14 K/UL
EOSINOPHIL NFR BLD AUTO: 1.3 %
ESTIMATED AVERAGE GLUCOSE: 148 MG/DL
GLUCOSE QUALITATIVE U: NEGATIVE
GLUCOSE SERPL-MCNC: 118 MG/DL
HBA1C MFR BLD HPLC: 6.8 %
HCT VFR BLD CALC: 41.9 %
HDLC SERPL-MCNC: 32 MG/DL
HGB BLD-MCNC: 13.3 G/DL
IMM GRANULOCYTES NFR BLD AUTO: 0.4 %
KETONES URINE: NEGATIVE
LDLC SERPL CALC-MCNC: NORMAL MG/DL
LEUKOCYTE ESTERASE URINE: NEGATIVE
LPL SERPL-CCNC: 31 U/L
LYMPHOCYTES # BLD AUTO: 2.55 K/UL
LYMPHOCYTES NFR BLD AUTO: 23.4 %
MAN DIFF?: NORMAL
MCHC RBC-ENTMCNC: 27 PG
MCHC RBC-ENTMCNC: 31.7 GM/DL
MCV RBC AUTO: 85.2 FL
MICROALBUMIN 24H UR DL<=1MG/L-MCNC: <1.2 MG/DL
MICROALBUMIN/CREAT 24H UR-RTO: NORMAL MG/G
MONOCYTES # BLD AUTO: 0.86 K/UL
MONOCYTES NFR BLD AUTO: 7.9 %
NEUTROPHILS # BLD AUTO: 7.21 K/UL
NEUTROPHILS NFR BLD AUTO: 66.3 %
NITRITE URINE: NEGATIVE
PH URINE: 6
PLATELET # BLD AUTO: 339 K/UL
POTASSIUM SERPL-SCNC: 4 MMOL/L
PROT SERPL-MCNC: 7.3 G/DL
PROTEIN URINE: NEGATIVE
RBC # BLD: 4.92 M/UL
RBC # FLD: 14.1 %
SODIUM SERPL-SCNC: 142 MMOL/L
SPECIFIC GRAVITY URINE: 1.01
TRIGL SERPL-MCNC: 497 MG/DL
TSH SERPL-ACNC: 1.21 UIU/ML
UROBILINOGEN URINE: NORMAL
WBC # FLD AUTO: 10.88 K/UL

## 2019-11-22 ENCOUNTER — RX RENEWAL (OUTPATIENT)
Age: 59
End: 2019-11-22

## 2019-11-25 ENCOUNTER — RX RENEWAL (OUTPATIENT)
Age: 59
End: 2019-11-25

## 2019-11-26 LAB — HEMOCCULT STL QL IA: NEGATIVE

## 2019-12-16 ENCOUNTER — RX RENEWAL (OUTPATIENT)
Age: 59
End: 2019-12-16

## 2019-12-22 ENCOUNTER — RX RENEWAL (OUTPATIENT)
Age: 59
End: 2019-12-22

## 2020-01-23 ENCOUNTER — RX RENEWAL (OUTPATIENT)
Age: 60
End: 2020-01-23

## 2020-01-24 ENCOUNTER — APPOINTMENT (OUTPATIENT)
Dept: INTERNAL MEDICINE | Facility: CLINIC | Age: 60
End: 2020-01-24

## 2020-01-28 ENCOUNTER — APPOINTMENT (OUTPATIENT)
Dept: INTERNAL MEDICINE | Facility: CLINIC | Age: 60
End: 2020-01-28
Payer: COMMERCIAL

## 2020-01-28 VITALS — DIASTOLIC BLOOD PRESSURE: 88 MMHG | SYSTOLIC BLOOD PRESSURE: 168 MMHG

## 2020-01-28 VITALS
RESPIRATION RATE: 14 BRPM | DIASTOLIC BLOOD PRESSURE: 100 MMHG | HEART RATE: 74 BPM | TEMPERATURE: 98.1 F | OXYGEN SATURATION: 97 % | SYSTOLIC BLOOD PRESSURE: 160 MMHG

## 2020-01-28 LAB
FLUAV SPEC QL CULT: NORMAL
FLUBV AG SPEC QL IA: NORMAL

## 2020-01-28 PROCEDURE — 87804 INFLUENZA ASSAY W/OPTIC: CPT | Mod: 59,QW

## 2020-01-28 PROCEDURE — 99214 OFFICE O/P EST MOD 30 MIN: CPT | Mod: 25

## 2020-01-28 RX ORDER — METHYLPREDNISOLONE 4 MG/1
4 TABLET ORAL
Qty: 21 | Refills: 0 | Status: DISCONTINUED | COMMUNITY
Start: 2018-11-20 | End: 2020-01-28

## 2020-01-28 RX ORDER — AZITHROMYCIN 250 MG/1
250 TABLET, FILM COATED ORAL
Qty: 1 | Refills: 0 | Status: DISCONTINUED | COMMUNITY
Start: 2019-06-26 | End: 2020-01-28

## 2020-01-28 RX ORDER — AMOXICILLIN AND CLAVULANATE POTASSIUM 875; 125 MG/1; MG/1
875-125 TABLET, COATED ORAL
Qty: 14 | Refills: 0 | Status: DISCONTINUED | COMMUNITY
Start: 2019-07-03 | End: 2020-01-28

## 2020-01-28 NOTE — PHYSICAL EXAM
[No Acute Distress] : no acute distress [Well Nourished] : well nourished [Normal Sclera/Conjunctiva] : normal sclera/conjunctiva [Well-Appearing] : well-appearing [Well Developed] : well developed [EOMI] : extraocular movements intact [PERRL] : pupils equal round and reactive to light [Normal Oropharynx] : the oropharynx was normal [No JVD] : no jugular venous distention [Normal Outer Ear/Nose] : the outer ears and nose were normal in appearance [No Lymphadenopathy] : no lymphadenopathy [Supple] : supple [No Accessory Muscle Use] : no accessory muscle use [No Respiratory Distress] : no respiratory distress  [Thyroid Normal, No Nodules] : the thyroid was normal and there were no nodules present [Clear to Auscultation] : lungs were clear to auscultation bilaterally [Regular Rhythm] : with a regular rhythm [Normal Rate] : normal rate  [No Murmur] : no murmur heard [Normal S1, S2] : normal S1 and S2 [No Abdominal Bruit] : a ~M bruit was not heard ~T in the abdomen [No Carotid Bruits] : no carotid bruits [No Varicosities] : no varicosities [Pedal Pulses Present] : the pedal pulses are present [No Palpable Aorta] : no palpable aorta [No Edema] : there was no peripheral edema [No Extremity Clubbing/Cyanosis] : no extremity clubbing/cyanosis [Non Tender] : non-tender [Soft] : abdomen soft [No HSM] : no HSM [No Masses] : no abdominal mass palpated [Non-distended] : non-distended [Normal Bowel Sounds] : normal bowel sounds [Normal Posterior Cervical Nodes] : no posterior cervical lymphadenopathy [No CVA Tenderness] : no CVA  tenderness [Normal Anterior Cervical Nodes] : no anterior cervical lymphadenopathy [No Spinal Tenderness] : no spinal tenderness [No Joint Swelling] : no joint swelling [Coordination Grossly Intact] : coordination grossly intact [Grossly Normal Strength/Tone] : grossly normal strength/tone [No Rash] : no rash [Normal Gait] : normal gait [No Focal Deficits] : no focal deficits [Deep Tendon Reflexes (DTR)] : deep tendon reflexes were 2+ and symmetric [Normal Affect] : the affect was normal [Normal Insight/Judgement] : insight and judgment were intact [de-identified] : bilateral maxillary sinus pressure

## 2020-01-28 NOTE — HISTORY OF PRESENT ILLNESS
[FreeTextEntry8] : Pt c/o bilateral sinus pain for 1 week. Pt c/o head congestion, tinnitus, sore throat, body aches.

## 2020-01-28 NOTE — PLAN
[FreeTextEntry1] : Increase indapamide to 2.5 mg QD\par Continue other meds\par RTO 2 weeks to repat BP\par Recommend weight loss, improved diet/exercise

## 2020-02-11 ENCOUNTER — APPOINTMENT (OUTPATIENT)
Dept: INTERNAL MEDICINE | Facility: CLINIC | Age: 60
End: 2020-02-11

## 2020-02-12 NOTE — REVIEW OF SYSTEMS
Caller would like to discuss an/a Appointment with Buffy Laguerre. Writer advised caller of callback within 24-72 hours.    Patient Name: Adriana MONTES Press  Caller Name: manoj  Name of Facility: [facility]  Callback Number: [phone]    Best Availability: anytime  Can A Detailed Message Be left? yes  Fax Number: [phone]  Additional Info: Patient would like to establish care with Buffy Laguerre for her FOB. Writer shows no available template. Please call if she can schedule with Buffy.   Did you confirm the message with the caller?: yes    Thank you,  Peggy Sanchez    
Writer informed pt that Buffy is currently practicing at a different clinic however does cover on-call so she may see her during labor and delivery. Pt was interested in her because she mentioned breastfeeding in her bio on the [employer] web site. Pt states that breastfeeding is very important to as her breastfeeding journey with her first child did not go as she had hoped. Writer informed pt that all of the midwives are very supportive of breastfeeding and helping foster that relationship during pregnancy and in the postpartum period. Lots of resources available.  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 Pt is about 6 weeks today,pt was scheduled for FOB for 3/4/2020 at 4:00 pm and also placed order for ultrasound, pt was scheduled for dating ultrasound prior to FOB visit at 3:00pm in [facility]. Pt verbalized understanding and was without further questions.   
[Negative] : Integumentary

## 2020-02-19 ENCOUNTER — APPOINTMENT (OUTPATIENT)
Dept: INTERNAL MEDICINE | Facility: CLINIC | Age: 60
End: 2020-02-19
Payer: COMMERCIAL

## 2020-02-19 VITALS
BODY MASS INDEX: 28.12 KG/M2 | DIASTOLIC BLOOD PRESSURE: 78 MMHG | WEIGHT: 175 LBS | RESPIRATION RATE: 14 BRPM | TEMPERATURE: 97.3 F | OXYGEN SATURATION: 93 % | SYSTOLIC BLOOD PRESSURE: 126 MMHG | HEART RATE: 102 BPM | HEIGHT: 66 IN

## 2020-02-19 DIAGNOSIS — M54.16 RADICULOPATHY, LUMBAR REGION: ICD-10-CM

## 2020-02-19 PROCEDURE — 36415 COLL VENOUS BLD VENIPUNCTURE: CPT

## 2020-02-19 PROCEDURE — 99214 OFFICE O/P EST MOD 30 MIN: CPT | Mod: 25

## 2020-02-19 NOTE — HISTORY OF PRESENT ILLNESS
[FreeTextEntry8] : has lower back since on JArdiance concerned regarding her kidneys\par Jardiance held today

## 2020-02-19 NOTE — PHYSICAL EXAM
[No Acute Distress] : no acute distress [Well Nourished] : well nourished [Well-Appearing] : well-appearing [Well Developed] : well developed [Normal Voice/Communication] : normal voice/communication [Normal Sclera/Conjunctiva] : normal sclera/conjunctiva [EOMI] : extraocular movements intact [PERRL] : pupils equal round and reactive to light [Normal Outer Ear/Nose] : the outer ears and nose were normal in appearance [No JVD] : no jugular venous distention [No Lymphadenopathy] : no lymphadenopathy [Normal Oropharynx] : the oropharynx was normal [Thyroid Normal, No Nodules] : the thyroid was normal and there were no nodules present [Supple] : supple [No Accessory Muscle Use] : no accessory muscle use [No Respiratory Distress] : no respiratory distress  [Regular Rhythm] : with a regular rhythm [Normal Rate] : normal rate  [Clear to Auscultation] : lungs were clear to auscultation bilaterally [No Carotid Bruits] : no carotid bruits [Normal S1, S2] : normal S1 and S2 [No Murmur] : no murmur heard [No Abdominal Bruit] : a ~M bruit was not heard ~T in the abdomen [No Varicosities] : no varicosities [Pedal Pulses Present] : the pedal pulses are present [No Edema] : there was no peripheral edema [No Extremity Clubbing/Cyanosis] : no extremity clubbing/cyanosis [No Palpable Aorta] : no palpable aorta [Soft] : abdomen soft [No Masses] : no abdominal mass palpated [Non Tender] : non-tender [Non-distended] : non-distended [Normal Supraclavicular Nodes] : no supraclavicular lymphadenopathy [No HSM] : no HSM [Normal Bowel Sounds] : normal bowel sounds [Normal Anterior Cervical Nodes] : no anterior cervical lymphadenopathy [Normal Posterior Cervical Nodes] : no posterior cervical lymphadenopathy [No Joint Swelling] : no joint swelling [No Spinal Tenderness] : no spinal tenderness [No CVA Tenderness] : no CVA  tenderness [Grossly Normal Strength/Tone] : grossly normal strength/tone [No Rash] : no rash [Coordination Grossly Intact] : coordination grossly intact [Normal Gait] : normal gait [No Focal Deficits] : no focal deficits [Deep Tendon Reflexes (DTR)] : deep tendon reflexes were 2+ and symmetric [Normal Affect] : the affect was normal [Memory Grossly Normal] : memory grossly normal [Normal Insight/Judgement] : insight and judgment were intact [Normal Mood] : the mood was normal

## 2020-02-19 NOTE — HEALTH RISK ASSESSMENT
[No] : In the past 12 months have you used drugs other than those required for medical reasons? No [3] : 1) Little interest or pleasure doing things for nearly every day (3) [No falls in past year] : Patient reported no falls in the past year [2] : 2) Feeling down, depressed, or hopeless for more than half of the days (2) [] : No

## 2020-02-20 ENCOUNTER — APPOINTMENT (OUTPATIENT)
Dept: INTERNAL MEDICINE | Facility: CLINIC | Age: 60
End: 2020-02-20

## 2020-02-20 LAB
ALBUMIN SERPL ELPH-MCNC: 4.7 G/DL
ALP BLD-CCNC: 87 U/L
ALT SERPL-CCNC: 32 U/L
ANION GAP SERPL CALC-SCNC: 16 MMOL/L
AST SERPL-CCNC: 16 U/L
BASOPHILS # BLD AUTO: 0.08 K/UL
BASOPHILS NFR BLD AUTO: 0.6 %
BILIRUB SERPL-MCNC: 0.2 MG/DL
BUN SERPL-MCNC: 26 MG/DL
CALCIUM SERPL-MCNC: 10.8 MG/DL
CHLORIDE SERPL-SCNC: 99 MMOL/L
CO2 SERPL-SCNC: 25 MMOL/L
CREAT SERPL-MCNC: 0.69 MG/DL
EOSINOPHIL # BLD AUTO: 0.17 K/UL
EOSINOPHIL NFR BLD AUTO: 1.4 %
GLUCOSE SERPL-MCNC: 102 MG/DL
HCT VFR BLD CALC: 41.6 %
HGB BLD-MCNC: 12.9 G/DL
IMM GRANULOCYTES NFR BLD AUTO: 0.5 %
LYMPHOCYTES # BLD AUTO: 2.79 K/UL
LYMPHOCYTES NFR BLD AUTO: 22.2 %
MAN DIFF?: NORMAL
MCHC RBC-ENTMCNC: 26.9 PG
MCHC RBC-ENTMCNC: 31 GM/DL
MCV RBC AUTO: 86.7 FL
MONOCYTES # BLD AUTO: 1.07 K/UL
MONOCYTES NFR BLD AUTO: 8.5 %
NEUTROPHILS # BLD AUTO: 8.4 K/UL
NEUTROPHILS NFR BLD AUTO: 66.8 %
PLATELET # BLD AUTO: 321 K/UL
POTASSIUM SERPL-SCNC: 3.9 MMOL/L
PROT SERPL-MCNC: 7.1 G/DL
RBC # BLD: 4.8 M/UL
RBC # FLD: 14.2 %
SODIUM SERPL-SCNC: 139 MMOL/L
WBC # FLD AUTO: 12.57 K/UL

## 2020-02-21 ENCOUNTER — RX RENEWAL (OUTPATIENT)
Age: 60
End: 2020-02-21

## 2020-02-26 ENCOUNTER — RX RENEWAL (OUTPATIENT)
Age: 60
End: 2020-02-26

## 2020-02-28 NOTE — DISCHARGE NOTE ADULT - PATIENT PORTAL LINK FT
You can access the [facility] Patient Portal, offered by [facility], by registering with the following website: [email or web address] Statement Selected

## 2020-02-29 DIAGNOSIS — R31.9 HEMATURIA, UNSPECIFIED: ICD-10-CM

## 2020-03-03 LAB
APPEARANCE: CLEAR
BACTERIA UR CULT: NORMAL
BASOPHILS # BLD AUTO: 0.11 K/UL
BASOPHILS NFR BLD AUTO: 1 %
BILIRUBIN URINE: NEGATIVE
BLOOD URINE: NEGATIVE
COLOR: NORMAL
EOSINOPHIL # BLD AUTO: 0.19 K/UL
EOSINOPHIL NFR BLD AUTO: 1.7 %
GLUCOSE QUALITATIVE U: ABNORMAL
HCT VFR BLD CALC: 40.1 %
HGB BLD-MCNC: 13.5 G/DL
IMM GRANULOCYTES NFR BLD AUTO: 0.5 %
KETONES URINE: NEGATIVE
LEUKOCYTE ESTERASE URINE: NEGATIVE
LYMPHOCYTES # BLD AUTO: 2.86 K/UL
LYMPHOCYTES NFR BLD AUTO: 25.8 %
MAN DIFF?: NORMAL
MCHC RBC-ENTMCNC: 28.4 PG
MCHC RBC-ENTMCNC: 33.7 GM/DL
MCV RBC AUTO: 84.4 FL
MONOCYTES # BLD AUTO: 1.06 K/UL
MONOCYTES NFR BLD AUTO: 9.6 %
NEUTROPHILS # BLD AUTO: 6.81 K/UL
NEUTROPHILS NFR BLD AUTO: 61.4 %
NITRITE URINE: NEGATIVE
PH URINE: 6
PLATELET # BLD AUTO: 332 K/UL
PROTEIN URINE: NEGATIVE
RBC # BLD: 4.75 M/UL
RBC # FLD: 14.3 %
SPECIFIC GRAVITY URINE: 1.03
UROBILINOGEN URINE: NORMAL
WBC # FLD AUTO: 11.08 K/UL

## 2020-03-22 ENCOUNTER — RX RENEWAL (OUTPATIENT)
Age: 60
End: 2020-03-22

## 2020-04-14 ENCOUNTER — RX RENEWAL (OUTPATIENT)
Age: 60
End: 2020-04-14

## 2020-04-23 ENCOUNTER — RX RENEWAL (OUTPATIENT)
Age: 60
End: 2020-04-23

## 2020-04-30 ENCOUNTER — RX RENEWAL (OUTPATIENT)
Age: 60
End: 2020-04-30

## 2020-05-22 ENCOUNTER — RX RENEWAL (OUTPATIENT)
Age: 60
End: 2020-05-22

## 2020-06-20 ENCOUNTER — RX RENEWAL (OUTPATIENT)
Age: 60
End: 2020-06-20

## 2020-06-20 RX ORDER — FLUTICASONE PROPIONATE 50 UG/1
50 SPRAY, METERED NASAL DAILY
Qty: 16 | Refills: 2 | Status: ACTIVE | COMMUNITY
Start: 2020-01-28 | End: 1900-01-01

## 2020-06-23 ENCOUNTER — RX RENEWAL (OUTPATIENT)
Age: 60
End: 2020-06-23

## 2020-06-23 ENCOUNTER — APPOINTMENT (OUTPATIENT)
Dept: INTERNAL MEDICINE | Facility: CLINIC | Age: 60
End: 2020-06-23
Payer: COMMERCIAL

## 2020-06-23 PROCEDURE — 99442: CPT

## 2020-06-23 RX ORDER — AMOXICILLIN AND CLAVULANATE POTASSIUM 875; 125 MG/1; MG/1
875-125 TABLET, COATED ORAL
Qty: 20 | Refills: 0 | Status: DISCONTINUED | COMMUNITY
Start: 2020-01-28 | End: 2020-06-23

## 2020-06-23 RX ORDER — CYCLOBENZAPRINE HYDROCHLORIDE 5 MG/1
5 TABLET, FILM COATED ORAL 3 TIMES DAILY
Qty: 30 | Refills: 0 | Status: DISCONTINUED | COMMUNITY
Start: 2018-11-20 | End: 2020-06-23

## 2020-06-24 ENCOUNTER — RX RENEWAL (OUTPATIENT)
Age: 60
End: 2020-06-24

## 2020-07-06 ENCOUNTER — RX RENEWAL (OUTPATIENT)
Age: 60
End: 2020-07-06

## 2020-07-06 ENCOUNTER — APPOINTMENT (OUTPATIENT)
Dept: INTERNAL MEDICINE | Facility: CLINIC | Age: 60
End: 2020-07-06

## 2020-07-27 ENCOUNTER — RX RENEWAL (OUTPATIENT)
Age: 60
End: 2020-07-27

## 2020-08-03 ENCOUNTER — APPOINTMENT (OUTPATIENT)
Dept: INTERNAL MEDICINE | Facility: CLINIC | Age: 60
End: 2020-08-03
Payer: COMMERCIAL

## 2020-08-03 VITALS
DIASTOLIC BLOOD PRESSURE: 74 MMHG | OXYGEN SATURATION: 97 % | RESPIRATION RATE: 14 BRPM | TEMPERATURE: 97.7 F | SYSTOLIC BLOOD PRESSURE: 122 MMHG | HEART RATE: 92 BPM | HEIGHT: 66 IN

## 2020-08-03 DIAGNOSIS — M54.9 DORSALGIA, UNSPECIFIED: ICD-10-CM

## 2020-08-03 PROCEDURE — 36415 COLL VENOUS BLD VENIPUNCTURE: CPT

## 2020-08-03 PROCEDURE — 99214 OFFICE O/P EST MOD 30 MIN: CPT | Mod: 25

## 2020-08-03 NOTE — HEALTH RISK ASSESSMENT
[No] : No [No falls in past year] : Patient reported no falls in the past year [0] : 1) Little interest or pleasure doing things: Not at all (0) [] : No [[id number]] : 0

## 2020-08-03 NOTE — PHYSICAL EXAM
[Well Nourished] : well nourished [No Acute Distress] : no acute distress [Well Developed] : well developed [Well-Appearing] : well-appearing [PERRL] : pupils equal round and reactive to light [Normal Sclera/Conjunctiva] : normal sclera/conjunctiva [EOMI] : extraocular movements intact [Normal Outer Ear/Nose] : the outer ears and nose were normal in appearance [Normal Oropharynx] : the oropharynx was normal [No Lymphadenopathy] : no lymphadenopathy [No JVD] : no jugular venous distention [Thyroid Normal, No Nodules] : the thyroid was normal and there were no nodules present [No Respiratory Distress] : no respiratory distress  [Supple] : supple [No Accessory Muscle Use] : no accessory muscle use [Clear to Auscultation] : lungs were clear to auscultation bilaterally [Normal Rate] : normal rate  [Regular Rhythm] : with a regular rhythm [No Murmur] : no murmur heard [Normal S1, S2] : normal S1 and S2 [No Abdominal Bruit] : a ~M bruit was not heard ~T in the abdomen [No Carotid Bruits] : no carotid bruits [No Varicosities] : no varicosities [Pedal Pulses Present] : the pedal pulses are present [No Edema] : there was no peripheral edema [No Extremity Clubbing/Cyanosis] : no extremity clubbing/cyanosis [No Palpable Aorta] : no palpable aorta [Non Tender] : non-tender [Non-distended] : non-distended [Soft] : abdomen soft [Normal Bowel Sounds] : normal bowel sounds [No HSM] : no HSM [No Masses] : no abdominal mass palpated [Normal Supraclavicular Nodes] : no supraclavicular lymphadenopathy [Normal Anterior Cervical Nodes] : no anterior cervical lymphadenopathy [Normal Posterior Cervical Nodes] : no posterior cervical lymphadenopathy [No Joint Swelling] : no joint swelling [No CVA Tenderness] : no CVA  tenderness [No Spinal Tenderness] : no spinal tenderness [Grossly Normal Strength/Tone] : grossly normal strength/tone [No Rash] : no rash [Coordination Grossly Intact] : coordination grossly intact [Normal Insight/Judgement] : insight and judgment were intact [No Focal Deficits] : no focal deficits [Normal Gait] : normal gait [Normal Affect] : the affect was normal [de-identified] : back tenderness bilateral

## 2020-08-06 DIAGNOSIS — D72.829 ELEVATED WHITE BLOOD CELL COUNT, UNSPECIFIED: ICD-10-CM

## 2020-08-06 LAB
APPEARANCE: CLEAR
BILIRUBIN URINE: NEGATIVE
BLOOD URINE: NEGATIVE
COLOR: NORMAL
GLUCOSE QUALITATIVE U: NEGATIVE
KETONES URINE: NEGATIVE
LEUKOCYTE ESTERASE URINE: NEGATIVE
NITRITE URINE: NEGATIVE
PH URINE: 5.5
PROTEIN URINE: NEGATIVE
SPECIFIC GRAVITY URINE: 1.01
UROBILINOGEN URINE: NORMAL

## 2020-08-07 LAB
25(OH)D3 SERPL-MCNC: 27.5 NG/ML
ALBUMIN SERPL ELPH-MCNC: 4.8 G/DL
ALP BLD-CCNC: 72 U/L
ALT SERPL-CCNC: 29 U/L
AMYLASE/CREAT SERPL: 49 U/L
ANION GAP SERPL CALC-SCNC: 17 MMOL/L
AST SERPL-CCNC: 20 U/L
BASOPHILS # BLD AUTO: 0.1 K/UL
BASOPHILS NFR BLD AUTO: 0.8 %
BILIRUB SERPL-MCNC: 0.2 MG/DL
BUN SERPL-MCNC: 27 MG/DL
CALCIUM SERPL-MCNC: 10.4 MG/DL
CHLORIDE SERPL-SCNC: 99 MMOL/L
CHOLEST SERPL-MCNC: 198 MG/DL
CHOLEST/HDLC SERPL: 7 RATIO
CK SERPL-CCNC: 49 U/L
CO2 SERPL-SCNC: 25 MMOL/L
CREAT SERPL-MCNC: 0.69 MG/DL
EOSINOPHIL # BLD AUTO: 0.19 K/UL
EOSINOPHIL NFR BLD AUTO: 1.5 %
ESTIMATED AVERAGE GLUCOSE: 151 MG/DL
GLUCOSE SERPL-MCNC: 104 MG/DL
HBA1C MFR BLD HPLC: 6.9 %
HCT VFR BLD CALC: 43.4 %
HDLC SERPL-MCNC: 28 MG/DL
HGB BLD-MCNC: 12.8 G/DL
IMM GRANULOCYTES NFR BLD AUTO: 0.5 %
LDLC SERPL CALC-MCNC: NORMAL MG/DL
LPL SERPL-CCNC: 36 U/L
LYMPHOCYTES # BLD AUTO: 2.58 K/UL
LYMPHOCYTES NFR BLD AUTO: 20.6 %
MAN DIFF?: NORMAL
MCHC RBC-ENTMCNC: 26 PG
MCHC RBC-ENTMCNC: 29.5 GM/DL
MCV RBC AUTO: 88.2 FL
MONOCYTES # BLD AUTO: 1.02 K/UL
MONOCYTES NFR BLD AUTO: 8.1 %
NEUTROPHILS # BLD AUTO: 8.57 K/UL
NEUTROPHILS NFR BLD AUTO: 68.5 %
PLATELET # BLD AUTO: 414 K/UL
POTASSIUM SERPL-SCNC: 4.2 MMOL/L
PROT SERPL-MCNC: 7.4 G/DL
RBC # BLD: 4.92 M/UL
RBC # FLD: 15.7 %
SODIUM SERPL-SCNC: 141 MMOL/L
TRIGL SERPL-MCNC: 854 MG/DL
TSH SERPL-ACNC: 1.17 UIU/ML
WBC # FLD AUTO: 12.52 K/UL

## 2020-08-09 ENCOUNTER — EMERGENCY (EMERGENCY)
Facility: HOSPITAL | Age: 60
LOS: 1 days | Discharge: HOME | End: 2020-08-09
Attending: EMERGENCY MEDICINE | Admitting: EMERGENCY MEDICINE
Payer: COMMERCIAL

## 2020-08-09 VITALS
TEMPERATURE: 98 F | WEIGHT: 179.9 LBS | OXYGEN SATURATION: 96 % | RESPIRATION RATE: 14 BRPM | DIASTOLIC BLOOD PRESSURE: 95 MMHG | HEIGHT: 66 IN | SYSTOLIC BLOOD PRESSURE: 160 MMHG | HEART RATE: 66 BPM

## 2020-08-09 VITALS
HEART RATE: 69 BPM | SYSTOLIC BLOOD PRESSURE: 132 MMHG | RESPIRATION RATE: 16 BRPM | OXYGEN SATURATION: 99 % | TEMPERATURE: 98 F | DIASTOLIC BLOOD PRESSURE: 84 MMHG

## 2020-08-09 DIAGNOSIS — Z86.018 PERSONAL HISTORY OF OTHER BENIGN NEOPLASM: Chronic | ICD-10-CM

## 2020-08-09 PROCEDURE — 99283 EMERGENCY DEPT VISIT LOW MDM: CPT

## 2020-08-09 RX ORDER — TOBRAMYCIN 0.3 %
1 DROPS OPHTHALMIC (EYE) ONCE
Refills: 0 | Status: COMPLETED | OUTPATIENT
Start: 2020-08-09 | End: 2020-08-09

## 2020-08-09 RX ORDER — TOBRAMYCIN AND DEXAMETHASONE 1; 3 MG/ML; MG/ML
2 SUSPENSION/ DROPS OPHTHALMIC
Qty: 1 | Refills: 0
Start: 2020-08-09 | End: 2020-08-15

## 2020-08-09 RX ADMIN — Medication 1 DROP(S): at 01:19

## 2020-08-09 RX ADMIN — Medication 1 DROP(S): at 01:00

## 2020-08-09 NOTE — ED PROVIDER NOTE - PROGRESS NOTE DETAILS
results reviewed, pt advised warm compresses, and to avoid eye makeup.  Will follow up with optho for a recheck.  No FB noted to eye, no corneal abrasion noted

## 2020-08-09 NOTE — ED PROVIDER NOTE - BOTH EYES:     20/
"ADMIT NOTE: 38 yo WF received from our ER after getting report from BARTOLO Melendez.  Pt was received in a wheelchair escorted by a MHT and security.  Was able to stand and wanded, mo metal was detected. Ambulated onto unit and into a staff office, anxious and cooperative.. VS taken with slight increase noted to pulse and BP.  Seem to calm some afterdoing body assessment witnessed by MHT.   Noted various tattoos and reddened right thumb.  Pt states she think she hurt thumb cleaning out a trailer without gloves.  admit assessment completed.  Pt states he has been off meds for about 2 mos because her medicaid ran out but just was restarted and she took her meds for the past 2 days. And c/o hearing voices, not sleeping and not eating much.   Feels she needed to come in hospital for med adjustment.  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  Wants to quit doing this.    Also is 1"2 PPD cigarette smoker and is ready to dane. Reviewed smoking cessation protocal and voiced understanding.  Pt is very forth coming with information. Unit orientation done and voiced understanding.  Upon showing her to her room pt requested a room closer to unit.  Her anxiety seem to increase and became more paranoid as if responding to internal stimuli.  Offered food but did not want it. Began pacing in the reilly,  Reassurance given but not helpful.  Did eventually go to room and is reading.  Will continue to monitor.  Safety and precautions maintained, bed low and pathway kept clear.  " 30

## 2020-08-09 NOTE — ED PROVIDER NOTE - CARE PROVIDER_API CALL
Jamie Rubio)  Ophthalmology  [address] [place] [address]  Phone: [phone]  Fax: [phone]  Follow Up Time:

## 2020-08-09 NOTE — ED PROVIDER NOTE - OBJECTIVE STATEMENT
Pt is a 58 yo female who presents to the ED with a cc of "bump" to inside of eyelid. PMHx of Anxiety, Chronic pancreatitis, DM, HTN. Pt reports that yesterday she noted that her right eye appeared irritated with increased pruritis.  Today she also reports some increased watery discharge from her eye and when she looked in the mirror she thought that her eye appeared red.  She then states that she looked in the inner lid and noted a white "bump."  Pt became concerned and came to the ED for further work up.  Denies noting any FB to eye.  Pt does not wear contact lenses and denies visual changes.  Denies fever, chills, N/V, CP, SOB, abd pain.  Has seen Dr. Rubio in the past but does not follow with optho regularly.    Pt friend in the room is concerned that the mask the pt has been wearing is the cause of her symptoms as she is "breathing in her own CO2"

## 2020-08-09 NOTE — ED ADULT NURSE NOTE - OBJECTIVE STATEMENT
Patient complaining of right eye redness, itching and stated she had pus coming out of her right eye started today was seen and evaluated by MD.

## 2020-08-09 NOTE — ED PROVIDER NOTE - PATIENT PORTAL LINK FT
You can access the FollowMyHealth Patient Portal offered by [facility] by registering at the following website: [email or web address]. By joining [facility]’s FollowMyHealth portal, you will also be able to view your health information using other applications (apps) compatible with our system.

## 2020-08-09 NOTE — ED PROVIDER NOTE - CLINICAL SUMMARY MEDICAL DECISION MAKING FREE TEXT BOX
Pt is a 60 yo female who presents to the ED with a cc of "bump" to inside of eyelid. PMHx of Anxiety, Chronic pancreatitis, DM, HTN. Pt reports that yesterday she noted that her right eye appeared irritated with increased pruritis.  Today she also reports some increased watery discharge from her eye and when she looked in the mirror she thought that her eye appeared red.  She then states that she looked in the inner lid and noted a white "bump."  Pt became concerned and came to the ED for further work up.  Denies noting any FB to eye.  Pt does not wear contact lenses and denies visual changes.  Denies fever, chills, N/V, CP, SOB, abd pain.  Has seen Dr. Rubio in the past but does not follow with optho regularly.  Pt with visualized Hordeolum.  Will stain and check eye and will treat for symptoms.

## 2020-08-09 NOTE — ED ADULT TRIAGE NOTE - CHIEF COMPLAINT QUOTE
"I have pus in my eye."  pt states she had itching to right eye yesterday, today has "mucous or pus" in my eye and it is all red

## 2020-08-09 NOTE — ED PROVIDER NOTE - EYES [+], MLM
DATE OF SERVICE:  01/11/2020    CONSULTATION AND HISTORY AND PHYSICAL    HISTORY OF PRESENT ILLNESS:  This is an 82-year-old female who was brought to   my attention on 01/09/2020.  The patient had her third dislocation and the ER   was able to reduce the right hip prosthesis hip dislocation.  Therefore, I   wanted to put her in an adductor pillow on bed rest with her anticoagulants   until we order any specific right hip dislocation abduction brace with the   parameters specified in my note.  The patient unfortunately cannot have a   constrained component in at least for several months.  We recommend her being   in the hip abduction brace for 3 months and when she is not wearing it, she   needs to be in bed with the abduction pillow; she is going to have either the   pillow or the brace on at all times.    She is doing quite well on physical examination.  She understood me very well.    I answered all her questions.  Absolutely, does not want another surgery   done whatsoever and I agree, so we will try this bracing technique.  If she   dislocates in the brace or the pillow again, we more than likely will have to   do a constrained component, but definitely at this very moment and even in the   future, I do not believe she is a very good surgical candidate.  She is at   higher risk for multiple complications including pulmonary embolism and   hypotension requiring midodrine previously and when I had seen her as well.    So she is at high risk for complications of decompensation because of her   comorbidities.    When I had seen her, she just had the abduction brace fitted.  She is very   happy.  She is actually going to wear it while she is in bed at all times as   well.  She will keep the brace on for 3 months.  I also recommend as soon as   hospital medicine approves her going back to the rehab facility at [facility] for   continued time, but at this point for 3 months, she needs to be in the hip   abduction brace  that is fitted correctly for her not to dislocate.  She still   need the occupational therapy and physical therapy as much as possible,   therefore rehab readmit.  I will follow the patient in clinic within a few   weeks, take some x-rays to make sure she is doing well ___.  I am always happy   to talk to any other caregivers, whether it be physicians or physical   therapy, I am always happy to talk for any questions or concerns.  Again, she   is at high risk for complications for decompensation.  We need to try all   bracing techniques until they absolutely fail critically.    I can be reached at [phone] for any questions or concerns.             ____________________________________     JOSE LUIS Arroyo / RONN    DD:  01/11/2020 15:41:09  DT:  01/11/2020 16:15:19    D#:  [id number]  Job#:  [id number]   REDNESS/DISCHARGE

## 2020-08-09 NOTE — ED PROVIDER NOTE - NSFOLLOWUPINSTRUCTIONS_ED_ALL_ED_FT
Return to the ED for any new or worsening symptoms  Take your medication as prescribed  Tobrex to affected eye per label instruction   Warm compresses to eye every few hours as instructed  No make up to affected eye  Call the eye doctor on Monday to schedule follow up  Advance activity as tolerated

## 2020-08-12 ENCOUNTER — APPOINTMENT (OUTPATIENT)
Dept: INTERNAL MEDICINE | Facility: CLINIC | Age: 60
End: 2020-08-12

## 2020-08-22 ENCOUNTER — RX RENEWAL (OUTPATIENT)
Age: 60
End: 2020-08-22

## 2020-08-22 ENCOUNTER — TRANSCRIPTION ENCOUNTER (OUTPATIENT)
Age: 60
End: 2020-08-22

## 2020-08-27 ENCOUNTER — APPOINTMENT (OUTPATIENT)
Dept: CHRONIC DISEASE MANAGEMENT | Facility: CLINIC | Age: 60
End: 2020-08-27

## 2020-08-27 VITALS — WEIGHT: 180 LBS | BODY MASS INDEX: 29.05 KG/M2

## 2020-09-13 ENCOUNTER — RX RENEWAL (OUTPATIENT)
Age: 60
End: 2020-09-13

## 2020-09-24 ENCOUNTER — APPOINTMENT (OUTPATIENT)
Dept: CHRONIC DISEASE MANAGEMENT | Facility: CLINIC | Age: 60
End: 2020-09-24

## 2020-09-25 ENCOUNTER — RX RENEWAL (OUTPATIENT)
Age: 60
End: 2020-09-25

## 2020-10-22 ENCOUNTER — NON-APPOINTMENT (OUTPATIENT)
Age: 60
End: 2020-10-22

## 2020-10-22 ENCOUNTER — APPOINTMENT (OUTPATIENT)
Dept: CHRONIC DISEASE MANAGEMENT | Facility: CLINIC | Age: 60
End: 2020-10-22

## 2020-11-02 ENCOUNTER — RX RENEWAL (OUTPATIENT)
Age: 60
End: 2020-11-02

## 2020-11-06 ENCOUNTER — RX RENEWAL (OUTPATIENT)
Age: 60
End: 2020-11-06

## 2020-11-25 ENCOUNTER — APPOINTMENT (OUTPATIENT)
Dept: INTERNAL MEDICINE | Facility: CLINIC | Age: 60
End: 2020-11-25

## 2020-11-25 ENCOUNTER — RX RENEWAL (OUTPATIENT)
Age: 60
End: 2020-11-25

## 2020-11-27 ENCOUNTER — TRANSCRIPTION ENCOUNTER (OUTPATIENT)
Age: 60
End: 2020-11-27

## 2020-12-09 ENCOUNTER — RX RENEWAL (OUTPATIENT)
Age: 60
End: 2020-12-09

## 2020-12-15 PROBLEM — Z86.69 HISTORY OF BACTERIAL CONJUNCTIVITIS: Status: RESOLVED | Noted: 2017-02-15 | Resolved: 2020-12-15

## 2020-12-21 PROBLEM — Z87.09 HISTORY OF SORE THROAT: Status: RESOLVED | Noted: 2019-10-02 | Resolved: 2020-12-21

## 2021-01-12 ENCOUNTER — APPOINTMENT (OUTPATIENT)
Dept: INTERNAL MEDICINE | Facility: CLINIC | Age: 61
End: 2021-01-12
Payer: COMMERCIAL

## 2021-01-12 DIAGNOSIS — R52 PAIN, UNSPECIFIED: ICD-10-CM

## 2021-01-12 PROCEDURE — 99213 OFFICE O/P EST LOW 20 MIN: CPT | Mod: 95

## 2021-01-12 NOTE — HEALTH RISK ASSESSMENT
[No] : No [No falls in past year] : Patient reported no falls in the past year [0] : 2) Feeling down, depressed, or hopeless: Not at all (0) [] : No [[id number]] : 0

## 2021-01-12 NOTE — HISTORY OF PRESENT ILLNESS
[Home] : at home, [unfilled] , at the time of the visit. [Medical Office: ([facility])___] : at the medical office located in  [Verbal consent obtained from patient] : the patient, [unfilled] [Cold Symptoms] : cold symptoms [Moderate] : moderate [___ Days ago] : [unfilled] days ago [Episodic] : episodic  [Congestion] : congestion [Headache] : headache [Stable] : stable [Sore Throat] : no sore throat [Chills] : no chills [Anorexia] : no anorexia [Shortness Of Breath] : no shortness of breath [Earache] : no earache [Fatigue] : not fatigue [Fever] : no fever

## 2021-02-15 ENCOUNTER — RX RENEWAL (OUTPATIENT)
Age: 61
End: 2021-02-15

## 2021-04-28 ENCOUNTER — APPOINTMENT (OUTPATIENT)
Dept: INTERNAL MEDICINE | Facility: CLINIC | Age: 61
End: 2021-04-28
Payer: COMMERCIAL

## 2021-04-28 VITALS
SYSTOLIC BLOOD PRESSURE: 160 MMHG | HEART RATE: 83 BPM | TEMPERATURE: 97.8 F | RESPIRATION RATE: 14 BRPM | OXYGEN SATURATION: 98 % | DIASTOLIC BLOOD PRESSURE: 96 MMHG

## 2021-04-28 VITALS — SYSTOLIC BLOOD PRESSURE: 134 MMHG | DIASTOLIC BLOOD PRESSURE: 86 MMHG

## 2021-04-28 DIAGNOSIS — H61.23 IMPACTED CERUMEN, BILATERAL: ICD-10-CM

## 2021-04-28 PROCEDURE — 99214 OFFICE O/P EST MOD 30 MIN: CPT

## 2021-04-28 PROCEDURE — 99072 ADDL SUPL MATRL&STAF TM PHE: CPT

## 2021-04-28 NOTE — HISTORY OF PRESENT ILLNESS
[Earache (R)] : pain in right ear [Moderate] : moderate [Gradual] : gradually [Constant] : constant [Earache] : earache [Stable] : stable [FreeTextEntry1] : 3 months [FreeTextEntry8] : Also here for follow up for her BP

## 2021-04-28 NOTE — PHYSICAL EXAM
[No Acute Distress] : no acute distress [Well Nourished] : well nourished [Well Developed] : well developed [Well-Appearing] : well-appearing [Normal Voice/Communication] : normal voice/communication [Normal Sclera/Conjunctiva] : normal sclera/conjunctiva [PERRL] : pupils equal round and reactive to light [EOMI] : extraocular movements intact [Normal Outer Ear/Nose] : the outer ears and nose were normal in appearance [No JVD] : no jugular venous distention [No Lymphadenopathy] : no lymphadenopathy [Supple] : supple [Thyroid Normal, No Nodules] : the thyroid was normal and there were no nodules present [No Respiratory Distress] : no respiratory distress  [No Accessory Muscle Use] : no accessory muscle use [Clear to Auscultation] : lungs were clear to auscultation bilaterally [Normal Rate] : normal rate  [Regular Rhythm] : with a regular rhythm [Normal S1, S2] : normal S1 and S2 [No Murmur] : no murmur heard [No Carotid Bruits] : no carotid bruits [No Abdominal Bruit] : a ~M bruit was not heard ~T in the abdomen [No Varicosities] : no varicosities [Pedal Pulses Present] : the pedal pulses are present [No Edema] : there was no peripheral edema [No Palpable Aorta] : no palpable aorta [No Extremity Clubbing/Cyanosis] : no extremity clubbing/cyanosis [Soft] : abdomen soft [Non Tender] : non-tender [Non-distended] : non-distended [No Masses] : no abdominal mass palpated [No HSM] : no HSM [Normal Bowel Sounds] : normal bowel sounds [Normal Supraclavicular Nodes] : no supraclavicular lymphadenopathy [Normal Posterior Cervical Nodes] : no posterior cervical lymphadenopathy [Normal Anterior Cervical Nodes] : no anterior cervical lymphadenopathy [No CVA Tenderness] : no CVA  tenderness [No Spinal Tenderness] : no spinal tenderness [No Joint Swelling] : no joint swelling [Grossly Normal Strength/Tone] : grossly normal strength/tone [No Rash] : no rash [Coordination Grossly Intact] : coordination grossly intact [No Focal Deficits] : no focal deficits [Normal Gait] : normal gait [Deep Tendon Reflexes (DTR)] : deep tendon reflexes were 2+ and symmetric [Speech Grossly Normal] : speech grossly normal [Memory Grossly Normal] : memory grossly normal [Normal Affect] : the affect was normal [Alert and Oriented x3] : oriented to person, place, and time [Normal Mood] : the mood was normal [Normal Insight/Judgement] : insight and judgment were intact [de-identified] : wax in both ears [de-identified] : ankle brace R

## 2021-04-28 NOTE — HEALTH RISK ASSESSMENT
[No] : In the past 12 months have you used drugs other than those required for medical reasons? No [Any fall with injury in past year] : Patient reported fall with injury in the past year [0] : 2) Feeling down, depressed, or hopeless: Not at all (0) [] : No [[id number]] : 0

## 2021-04-28 NOTE — END OF VISIT
[FreeTextEntry3] : "I, Kenneth Beth, personally scribed the services dictated to me by Dr. Darrin Edwards MD in this documentation on 04/28/2021 "\par \par "I Dr. Darrin Edwards MD, personally performed the services described in this documentation on 04/28/2021 for the patient as scribed by Kenneth Beth in my presence. I have reviewed and verified that all the information is accurate and true."

## 2021-05-17 ENCOUNTER — RX RENEWAL (OUTPATIENT)
Age: 61
End: 2021-05-17

## 2021-06-10 ENCOUNTER — RX RENEWAL (OUTPATIENT)
Age: 61
End: 2021-06-10

## 2021-06-13 ENCOUNTER — EMERGENCY (EMERGENCY)
Facility: HOSPITAL | Age: 61
LOS: 1 days | Discharge: HOME | End: 2021-06-13
Attending: EMERGENCY MEDICINE | Admitting: EMERGENCY MEDICINE
Payer: COMMERCIAL

## 2021-06-13 VITALS
SYSTOLIC BLOOD PRESSURE: 125 MMHG | HEART RATE: 75 BPM | TEMPERATURE: 98 F | OXYGEN SATURATION: 95 % | HEIGHT: 66 IN | DIASTOLIC BLOOD PRESSURE: 78 MMHG | WEIGHT: 169.98 LBS | RESPIRATION RATE: 15 BRPM

## 2021-06-13 VITALS
RESPIRATION RATE: 16 BRPM | TEMPERATURE: 98 F | DIASTOLIC BLOOD PRESSURE: 70 MMHG | SYSTOLIC BLOOD PRESSURE: 128 MMHG | OXYGEN SATURATION: 96 % | HEART RATE: 76 BPM

## 2021-06-13 DIAGNOSIS — Z86.018 PERSONAL HISTORY OF OTHER BENIGN NEOPLASM: Chronic | ICD-10-CM

## 2021-06-13 LAB
ALBUMIN SERPL ELPH-MCNC: 3.7 G/DL — SIGNIFICANT CHANGE UP (ref 3.3–5)
ALP SERPL-CCNC: 89 U/L — SIGNIFICANT CHANGE UP (ref 40–120)
ALT FLD-CCNC: 24 U/L — SIGNIFICANT CHANGE UP (ref 12–78)
ANION GAP SERPL CALC-SCNC: 9 MMOL/L — SIGNIFICANT CHANGE UP (ref 5–17)
APPEARANCE UR: CLEAR — SIGNIFICANT CHANGE UP
AST SERPL-CCNC: 12 U/L — LOW (ref 15–37)
BASOPHILS # BLD AUTO: 0.07 K/UL — SIGNIFICANT CHANGE UP (ref 0–0.2)
BASOPHILS NFR BLD AUTO: 0.5 % — SIGNIFICANT CHANGE UP (ref 0–2)
BILIRUB SERPL-MCNC: 0.3 MG/DL — SIGNIFICANT CHANGE UP (ref 0.2–1.2)
BILIRUB UR-MCNC: NEGATIVE — SIGNIFICANT CHANGE UP
BUN SERPL-MCNC: 27 MG/DL — HIGH (ref 7–23)
CALCIUM SERPL-MCNC: 9.4 MG/DL — SIGNIFICANT CHANGE UP (ref 8.5–10.1)
CHLORIDE SERPL-SCNC: 107 MMOL/L — SIGNIFICANT CHANGE UP (ref 96–108)
CO2 SERPL-SCNC: 27 MMOL/L — SIGNIFICANT CHANGE UP (ref 22–31)
COLOR SPEC: YELLOW — SIGNIFICANT CHANGE UP
CREAT SERPL-MCNC: 0.89 MG/DL — SIGNIFICANT CHANGE UP (ref 0.5–1.3)
DIFF PNL FLD: NEGATIVE — SIGNIFICANT CHANGE UP
EOSINOPHIL # BLD AUTO: 0.17 K/UL — SIGNIFICANT CHANGE UP (ref 0–0.5)
EOSINOPHIL NFR BLD AUTO: 1.1 % — SIGNIFICANT CHANGE UP (ref 0–6)
GLUCOSE SERPL-MCNC: 133 MG/DL — HIGH (ref 70–99)
GLUCOSE UR QL: NEGATIVE — SIGNIFICANT CHANGE UP
HCT VFR BLD CALC: 39.1 % — SIGNIFICANT CHANGE UP (ref 34.5–45)
HGB BLD-MCNC: 12.3 G/DL — SIGNIFICANT CHANGE UP (ref 11.5–15.5)
IMM GRANULOCYTES NFR BLD AUTO: 0.7 % — SIGNIFICANT CHANGE UP (ref 0–1.5)
KETONES UR-MCNC: ABNORMAL
LEUKOCYTE ESTERASE UR-ACNC: ABNORMAL
LIDOCAIN IGE QN: 197 U/L — SIGNIFICANT CHANGE UP (ref 73–393)
LYMPHOCYTES # BLD AUTO: 16.9 % — SIGNIFICANT CHANGE UP (ref 13–44)
LYMPHOCYTES # BLD AUTO: 2.57 K/UL — SIGNIFICANT CHANGE UP (ref 1–3.3)
MCHC RBC-ENTMCNC: 25.7 PG — LOW (ref 27–34)
MCHC RBC-ENTMCNC: 31.5 GM/DL — LOW (ref 32–36)
MCV RBC AUTO: 81.8 FL — SIGNIFICANT CHANGE UP (ref 80–100)
MONOCYTES # BLD AUTO: 1.17 K/UL — HIGH (ref 0–0.9)
MONOCYTES NFR BLD AUTO: 7.7 % — SIGNIFICANT CHANGE UP (ref 2–14)
NEUTROPHILS # BLD AUTO: 11.1 K/UL — HIGH (ref 1.8–7.4)
NEUTROPHILS NFR BLD AUTO: 73.1 % — SIGNIFICANT CHANGE UP (ref 43–77)
NITRITE UR-MCNC: NEGATIVE — SIGNIFICANT CHANGE UP
NRBC # BLD: 0 /100 WBCS — SIGNIFICANT CHANGE UP (ref 0–0)
PH UR: 5 — SIGNIFICANT CHANGE UP (ref 5–8)
PLATELET # BLD AUTO: 353 K/UL — SIGNIFICANT CHANGE UP (ref 150–400)
POTASSIUM SERPL-MCNC: 3.7 MMOL/L — SIGNIFICANT CHANGE UP (ref 3.5–5.3)
POTASSIUM SERPL-SCNC: 3.7 MMOL/L — SIGNIFICANT CHANGE UP (ref 3.5–5.3)
PROT SERPL-MCNC: 7.5 G/DL — SIGNIFICANT CHANGE UP (ref 6–8.3)
PROT UR-MCNC: 15
RBC # BLD: 4.78 M/UL — SIGNIFICANT CHANGE UP (ref 3.8–5.2)
RBC # FLD: 15.2 % — HIGH (ref 10.3–14.5)
SODIUM SERPL-SCNC: 143 MMOL/L — SIGNIFICANT CHANGE UP (ref 135–145)
SP GR SPEC: 1.03 — HIGH (ref 1.01–1.02)
UROBILINOGEN FLD QL: NEGATIVE — SIGNIFICANT CHANGE UP
WBC # BLD: 15.18 K/UL — HIGH (ref 3.8–10.5)
WBC # FLD AUTO: 15.18 K/UL — HIGH (ref 3.8–10.5)

## 2021-06-13 PROCEDURE — 99284 EMERGENCY DEPT VISIT MOD MDM: CPT

## 2021-06-13 PROCEDURE — 74177 CT ABD & PELVIS W/CONTRAST: CPT

## 2021-06-13 PROCEDURE — 81001 URINALYSIS AUTO W/SCOPE: CPT

## 2021-06-13 PROCEDURE — 99284 EMERGENCY DEPT VISIT MOD MDM: CPT | Mod: 25

## 2021-06-13 PROCEDURE — 74177 CT ABD & PELVIS W/CONTRAST: CPT | Mod: 26,MA

## 2021-06-13 PROCEDURE — 74019 RADEX ABDOMEN 2 VIEWS: CPT | Mod: 26

## 2021-06-13 PROCEDURE — 36415 COLL VENOUS BLD VENIPUNCTURE: CPT

## 2021-06-13 PROCEDURE — 80053 COMPREHEN METABOLIC PANEL: CPT

## 2021-06-13 PROCEDURE — 74019 RADEX ABDOMEN 2 VIEWS: CPT

## 2021-06-13 PROCEDURE — 85025 COMPLETE CBC W/AUTO DIFF WBC: CPT

## 2021-06-13 PROCEDURE — 83690 ASSAY OF LIPASE: CPT

## 2021-06-13 RX ORDER — SODIUM CHLORIDE 9 MG/ML
1000 INJECTION INTRAMUSCULAR; INTRAVENOUS; SUBCUTANEOUS ONCE
Refills: 0 | Status: COMPLETED | OUTPATIENT
Start: 2021-06-13 | End: 2021-06-13

## 2021-06-13 RX ADMIN — SODIUM CHLORIDE 1000 MILLILITER(S): 9 INJECTION INTRAMUSCULAR; INTRAVENOUS; SUBCUTANEOUS at 17:11

## 2021-06-13 RX ADMIN — Medication 1 TABLET(S): at 19:49

## 2021-06-13 NOTE — ED ADULT NURSE NOTE - CHPI ED NUR SYMPTOMS NEG
no blood in stool/no burning urination/no chills/no dysuria/no fever/no hematuria/no nausea/no vomiting

## 2021-06-13 NOTE — ED PROVIDER NOTE - CARE PROVIDER_API CALL
Hiro Valdez ()  Internal Medicine  [address] [place] [address]  Phone: [phone]  Fax: [phone]  Follow Up Time:

## 2021-06-13 NOTE — ED ADULT NURSE NOTE - OBJECTIVE STATEMENT
a/ox4 patient came to ED c.o diarrhea and abd cramping for 3 months. Per patient, her symptoms started after she began taking metformin last year. Abdomen area distended at this time. Afebrile, no cp/sob/n/v. Pending further labs and radiology reports at this time.

## 2021-06-13 NOTE — ED PROVIDER NOTE - NSFOLLOWUPINSTRUCTIONS_ED_ALL_ED_FT
Follow up with GI doctor for additional care.       Acute Diarrhea    WHAT YOU NEED TO KNOW:    Acute diarrhea starts quickly and lasts a short time, usually 1 to 3 days. It can last up to 2 weeks. You may not be able to control your diarrhea. Acute diarrhea usually stops on its own.     DISCHARGE INSTRUCTIONS:    Return to the emergency department if:   •You feel confused.  •Your heartbeat is faster than usual.   •Your eyes look deeply sunken, or you have no tears when you cry.   •You urinate less than usual, or your urine is dark yellow.   •You have blood or mucus in your bowel movements.  •You have severe abdominal pain.   •You are unable to drink any liquids.       Contact your healthcare provider if:   •Your symptoms do not get better with treatment.   •You have a fever higher than 101.3°F (38.5°C).   •You have trouble eating and drinking because you are vomiting.   •Your diarrhea does not get better in 7 days.   •You have questions or concerns about your condition or care.       Follow up with your healthcare provider as directed: Write down your questions so you remember to ask them during your visits.     Medicines:  •Diarrhea medicine is an over-the-counter medicine that helps slow or stop your diarrhea. Do not take this medicine unless your healthcare provider says it is okay.   •Antibiotics may be given to help treat an infection caused by bacteria.   •Antiparasitics may be given to treat an infection caused by parasites.   •Take your medicine as directed. Contact your healthcare provider if you think your medicine is not helping or if you have side effects. Tell him of her if you are allergic to any medicine. Keep a list of the medicines, vitamins, and herbs you take. Include the amounts, and when and why you take them. Bring the list or the pill bottles to follow-up visits. Carry your medicine list with you in case of an emergency.      Self-care:   •Drink liquids as directed. Liquids will help prevent dehydration caused by diarrhea. Ask your healthcare provider how much liquid to drink each day and which liquids are best for you. You may need to drink an oral rehydration solution (ORS). An ORS has the right amounts of water, salts, and sugar you need to replace body fluids. You can buy an ORS at most grocery stores and pharmacies.   •Eat foods that are easy to digest. Examples include rice, lentils, cereal, bananas, potatoes, and bread. It also includes some fruits (bananas, melon), well-cooked vegetables, and lean meats. Do not eat foods high in fiber, fat, and sugar. Do not drink alcohol until your diarrhea is gone.       Prevent acute diarrhea:   •Wash your hands often. Use soap and water. Wash your hands before you eat or prepare food. Also wash your hands after you use the bathroom. Use an alcohol-based hand gel when soap and water are not available.   Handwashing     •Keep bathroom surfaces clean. This helps prevent the spread of germs that cause acute diarrhea.   •Wash fruits and vegetables well before you eat them. This can help remove germs that cause diarrhea. If possible, remove the skin from fruits and vegetables, or cook them well before you eat them.   •Cook meat and poultry as directed. Meat includes beef and pork. Poultry includes chicken, turkey, and duck.?Cook ground meat to 160°F.     Cook ground poultry, whole poultry, or cuts of poultry to at least 165°F. Remove the poultry from heat. Let it stand for 3 minutes before you eat it.     ?Cook whole cuts of meat other than poultry to at least 145°F. Remove the meat from heat. Let it stand for 3 minutes before you eat it.     •Wash dishes that have touched raw meat or poultry with hot water and soap. This includes cutting boards, utensils, dishes, and serving containers.     •Place raw or cooked meat or poultry in the refrigerator as soon as possible. Bacteria can grow in meat or poultry that is left at room temperature too long.     •Do not eat raw or undercooked oysters, clams, or mussels. These foods may be contaminated and cause infection.     •Drink only filtered or treated water when you travel. Do not put ice in your drinks. Drink bottled water whenever possible.        © Copyright [employer] 2021 Follow up with GI doctor for additional care.           Diverticulitis    WHAT YOU NEED TO KNOW:    What is diverticulitis? Diverticulitis is a condition that causes small pockets along your intestine called diverticula to become inflamed or infected. This is caused by hard bowel movement, food, or bacteria that get stuck in the pockets.    What are the signs and symptoms of diverticulitis?   •Pain in the lower left side of your abdomen      •Fever and chills      •Nausea or vomiting       •Constipation or diarrhea       •An urge to urinate or have a bowel movement more often than usual       •Bloody bowel movements      •Bloating and gas       How is diverticulitis diagnosed? Your healthcare provider will examine you. He or she will ask questions about your symptoms and health history. You may need any of the following:   •Blood and urine tests may show signs of infection or inflammation.      •CT scan or ultrasound pictures may be used to find problems in your intestines. You may be given contrast liquid to help your intestines show up better in the pictures. Tell the healthcare provider if you have ever had an allergic reaction to contrast liquid.       •A colonoscopy is used to look at your intestines with a scope. A scope (long bendable tube with a light on the end) is used to take pictures. This test may show swollen diverticula or bleeding. Samples may be taken from your digestive tract and sent to a lab for tests. Bleeding may be controlled with tools that are inserted through the scope.       How is diverticulitis treated? Mild diverticulitis can be treated at home. You may need to rest and follow a clear liquid diet until your diverticulitis gets better. You will be admitted to the hospital if you have severe diverticulitis. You may need any of the following:   •Antibiotics help treat or prevent a bacterial infection.      •Prescription pain medicine may be given. Ask your healthcare provider how to take this medicine safely. Some prescription pain medicines contain acetaminophen. Do not take other medicines that contain acetaminophen without talking to your healthcare provider. Too much acetaminophen may cause liver damage. Prescription pain medicine may cause constipation. Ask your healthcare provider how to prevent or treat constipation.       •An IV may be used to give you liquids and nutrition. You may not be able to eat or drink anything until your healthcare provider says it is okay.      •Drainage may be done to reduce inflammation or treat infection. Your healthcare provider may insert a small tube through an incision in your abdomen to drain pus from infected diverticula.       •Surgery may be needed if there is a hole in your bowel or a large amount of swelling. A healthcare provider will remove the infected or inflamed areas of your colon.       How can I manage my symptoms? A clear liquid diet will allow your intestines to heal. A clear liquid diet includes any liquids that you can see through. Examples include water, ginger-muna, cranberry or apple juice, frozen fruit ice, or broth. Ask your healthcare provider for more information on a clear liquid diet.     When should I seek immediate care?   •You have bowel movement or foul-smelling discharge leaking from your vagina or in your urine.      •You have severe diarrhea.      •You urinate less than usual or not at all.      •You are not able to have a bowel movement.      •You cannot stop vomiting.       •You have severe abdominal pain, a fever, and your abdomen is larger than usual.       •You have new or increased blood in your bowel movements.       When should I contact my healthcare provider?   •You have pain when you urinate.      •Your symptoms get worse or do not go away.       •You have questions or concerns about your condition or care.       CARE AGREEMENT:    You have the right to help plan your care. Learn about your health condition and how it may be treated. Discuss treatment options with your healthcare providers to decide what care you want to receive. You always have the right to refuse treatment.        © Copyright [employer] 2021           back to top                          © Copyright [employer] 2021

## 2021-06-13 NOTE — ED PROVIDER NOTE - OBJECTIVE STATEMENT
61 yo female present to ED c/o many month history of intermittent abdominal pain and diarrhea. Per patient, her PCP increased her metformin and since then she has had stomach trouble. Patient has since blanco to GI Dr. Carmona in March who prescribed a powder for the diarrhea. patient only recently began taking this powder BUT has since stropped as she said it does not help. Over past 2 dasy pain and diarrhea has increased which is why she came to ED.  Denies fever, chills, dizziness or lightheadedness. Denies headache, CP or SOB. Denies n/v. + diarrhea and no constipation. Denies leg swelling. Denies blood in stool or black tarry stools. Denies urinary symptoms.

## 2021-06-13 NOTE — ED PROVIDER NOTE - PATIENT PORTAL LINK FT
You can access the FollowMyHealth Patient Portal offered by [facility] by registering at the following website: [email or web address]. By joining [facility]’s FollowMyHealth portal, you will also be able to view your health information using other applications (apps) compatible with our system.

## 2021-06-13 NOTE — ED PROVIDER NOTE - ATTENDING CONTRIBUTION TO CARE
61 y/o F with c/o intermitent lower abd cramping and diarrhea x few months.  pt was seen by GI Dr. Valdez and placed on medication to help with the diarrhea that pt discontinued with no relief of symptoms.     PE: unremarkbale    labs, UA, IVFS, CT?    r/o infectious etiology, obstruction, IBS

## 2021-06-13 NOTE — ED PROVIDER NOTE - PMH
Anxiety    Chronic pancreatitis, unspecified pancreatitis type    Diabetes    HTN (hypertension)

## 2021-06-13 NOTE — ED ADULT NURSE NOTE - NSIMPLEMENTINTERV_GEN_ALL_ED
Implemented All Universal Safety Interventions:  [place] to call system. Call bell, personal items and telephone within reach. Instruct patient to call for assistance. Room bathroom lighting operational. Non-slip footwear when patient is off stretcher. Physically safe environment: no spills, clutter or unnecessary equipment. Stretcher in lowest position, wheels locked, appropriate side rails in place.

## 2021-06-13 NOTE — ED PROVIDER NOTE - CLINICAL SUMMARY MEDICAL DECISION MAKING FREE TEXT BOX
59 yo female with supra pubic pain and associated diarrhea x months, worse x 2 days. labs, abd xray, fluids, reassess.

## 2021-06-16 ENCOUNTER — APPOINTMENT (OUTPATIENT)
Dept: INTERNAL MEDICINE | Facility: CLINIC | Age: 61
End: 2021-06-16

## 2021-08-05 ENCOUNTER — APPOINTMENT (OUTPATIENT)
Dept: INTERNAL MEDICINE | Facility: CLINIC | Age: 61
End: 2021-08-05
Payer: COMMERCIAL

## 2021-08-05 VITALS
DIASTOLIC BLOOD PRESSURE: 80 MMHG | RESPIRATION RATE: 14 BRPM | TEMPERATURE: 97.1 F | HEIGHT: 66 IN | HEART RATE: 72 BPM | SYSTOLIC BLOOD PRESSURE: 140 MMHG | OXYGEN SATURATION: 97 %

## 2021-08-05 DIAGNOSIS — K57.32 DIVERTICULITIS OF LARGE INTESTINE W/OUT PERFORATION OR ABSCESS W/OUT BLEEDING: ICD-10-CM

## 2021-08-05 PROCEDURE — 99214 OFFICE O/P EST MOD 30 MIN: CPT

## 2021-08-05 RX ORDER — AZITHROMYCIN 250 MG/1
250 TABLET, FILM COATED ORAL
Qty: 1 | Refills: 0 | Status: DISCONTINUED | COMMUNITY
Start: 2021-01-12 | End: 2021-08-05

## 2021-08-05 NOTE — HISTORY OF PRESENT ILLNESS
[FreeTextEntry8] : Pt reports that she went to the ER at [facility] on 6/13/21 and was diagnosed with diverticulitis. She was discharged from the ER on Augmentin.  Pt is scheduled to have a colonoscopy later this month.\par Today pt is c/o abdominal bloating. She has no abdominal pain.

## 2021-08-05 NOTE — PHYSICAL EXAM
[No Acute Distress] : no acute distress [Well Nourished] : well nourished [Well Developed] : well developed [Well-Appearing] : well-appearing [Normal Sclera/Conjunctiva] : normal sclera/conjunctiva [PERRL] : pupils equal round and reactive to light [EOMI] : extraocular movements intact [Normal Outer Ear/Nose] : the outer ears and nose were normal in appearance [Normal Oropharynx] : the oropharynx was normal [No JVD] : no jugular venous distention [No Lymphadenopathy] : no lymphadenopathy [Supple] : supple [Thyroid Normal, No Nodules] : the thyroid was normal and there were no nodules present [No Respiratory Distress] : no respiratory distress  [No Accessory Muscle Use] : no accessory muscle use [Clear to Auscultation] : lungs were clear to auscultation bilaterally [Normal Rate] : normal rate  [Regular Rhythm] : with a regular rhythm [Normal S1, S2] : normal S1 and S2 [No Murmur] : no murmur heard [No Carotid Bruits] : no carotid bruits [No Abdominal Bruit] : a ~M bruit was not heard ~T in the abdomen [No Varicosities] : no varicosities [Pedal Pulses Present] : the pedal pulses are present [No Edema] : there was no peripheral edema [No Palpable Aorta] : no palpable aorta [No Extremity Clubbing/Cyanosis] : no extremity clubbing/cyanosis [Soft] : abdomen soft [Non-distended] : non-distended [No Masses] : no abdominal mass palpated [No HSM] : no HSM [Normal Bowel Sounds] : normal bowel sounds [Normal Posterior Cervical Nodes] : no posterior cervical lymphadenopathy [Normal Anterior Cervical Nodes] : no anterior cervical lymphadenopathy [No CVA Tenderness] : no CVA  tenderness [No Spinal Tenderness] : no spinal tenderness [No Joint Swelling] : no joint swelling [Grossly Normal Strength/Tone] : grossly normal strength/tone [No Rash] : no rash [Coordination Grossly Intact] : coordination grossly intact [No Focal Deficits] : no focal deficits [Normal Gait] : normal gait [Deep Tendon Reflexes (DTR)] : deep tendon reflexes were 2+ and symmetric [Normal Affect] : the affect was normal [Normal Insight/Judgement] : insight and judgment were intact [de-identified] : Mild LLQ tenderness

## 2021-08-11 LAB
25(OH)D3 SERPL-MCNC: 36.3 NG/ML
ALBUMIN SERPL ELPH-MCNC: 4.5 G/DL
ALP BLD-CCNC: 94 U/L
ALT SERPL-CCNC: 17 U/L
ANION GAP SERPL CALC-SCNC: 14 MMOL/L
APPEARANCE: CLEAR
AST SERPL-CCNC: 13 U/L
BASOPHILS # BLD AUTO: 0.08 K/UL
BASOPHILS NFR BLD AUTO: 0.7 %
BILIRUB SERPL-MCNC: 0.2 MG/DL
BILIRUBIN URINE: NEGATIVE
BLOOD URINE: NEGATIVE
BUN SERPL-MCNC: 20 MG/DL
CALCIUM SERPL-MCNC: 10.3 MG/DL
CHLORIDE SERPL-SCNC: 103 MMOL/L
CHOLEST SERPL-MCNC: 180 MG/DL
CO2 SERPL-SCNC: 26 MMOL/L
COLOR: NORMAL
CREAT SERPL-MCNC: 0.62 MG/DL
EOSINOPHIL # BLD AUTO: 0.21 K/UL
EOSINOPHIL NFR BLD AUTO: 1.8 %
ESTIMATED AVERAGE GLUCOSE: 146 MG/DL
FOLATE SERPL-MCNC: 7.6 NG/ML
GLUCOSE QUALITATIVE U: NEGATIVE
GLUCOSE SERPL-MCNC: 100 MG/DL
HBA1C MFR BLD HPLC: 6.7 %
HCT VFR BLD CALC: 42.5 %
HDLC SERPL-MCNC: 25 MG/DL
HGB BLD-MCNC: 12.6 G/DL
IMM GRANULOCYTES NFR BLD AUTO: 0.4 %
KETONES URINE: NEGATIVE
LDLC SERPL CALC-MCNC: NORMAL MG/DL
LEUKOCYTE ESTERASE URINE: NEGATIVE
LYMPHOCYTES # BLD AUTO: 2.1 K/UL
LYMPHOCYTES NFR BLD AUTO: 18.4 %
MAN DIFF?: NORMAL
MCHC RBC-ENTMCNC: 26.6 PG
MCHC RBC-ENTMCNC: 29.6 GM/DL
MCV RBC AUTO: 89.9 FL
MONOCYTES # BLD AUTO: 0.96 K/UL
MONOCYTES NFR BLD AUTO: 8.4 %
NEUTROPHILS # BLD AUTO: 8.01 K/UL
NEUTROPHILS NFR BLD AUTO: 70.3 %
NITRITE URINE: NEGATIVE
NONHDLC SERPL-MCNC: 155 MG/DL
PH URINE: 6
PLATELET # BLD AUTO: 323 K/UL
POTASSIUM SERPL-SCNC: 4.3 MMOL/L
PROT SERPL-MCNC: 6.9 G/DL
PROTEIN URINE: NORMAL
RBC # BLD: 4.73 M/UL
RBC # FLD: 14.8 %
SODIUM SERPL-SCNC: 143 MMOL/L
SPECIFIC GRAVITY URINE: 1.02
TRIGL SERPL-MCNC: 764 MG/DL
TSH SERPL-ACNC: 1.08 UIU/ML
UROBILINOGEN URINE: NORMAL
VIT B12 SERPL-MCNC: 492 PG/ML
WBC # FLD AUTO: 11.41 K/UL

## 2021-09-09 ENCOUNTER — RX RENEWAL (OUTPATIENT)
Age: 61
End: 2021-09-09

## 2021-09-13 ENCOUNTER — APPOINTMENT (OUTPATIENT)
Dept: INTERNAL MEDICINE | Facility: CLINIC | Age: 61
End: 2021-09-13
Payer: COMMERCIAL

## 2021-09-13 VITALS
OXYGEN SATURATION: 96 % | WEIGHT: 165 LBS | RESPIRATION RATE: 14 BRPM | TEMPERATURE: 97.7 F | HEIGHT: 66 IN | HEART RATE: 81 BPM | BODY MASS INDEX: 26.52 KG/M2 | SYSTOLIC BLOOD PRESSURE: 130 MMHG | DIASTOLIC BLOOD PRESSURE: 80 MMHG

## 2021-09-13 DIAGNOSIS — Z23 ENCOUNTER FOR IMMUNIZATION: ICD-10-CM

## 2021-09-13 DIAGNOSIS — M19.90 UNSPECIFIED OSTEOARTHRITIS, UNSPECIFIED SITE: ICD-10-CM

## 2021-09-13 PROCEDURE — 99214 OFFICE O/P EST MOD 30 MIN: CPT

## 2021-09-13 RX ORDER — SODIUM PICOSULFATE, MAGNESIUM OXIDE, AND ANHYDROUS CITRIC ACID 10; 3.5; 12 MG/160ML; G/160ML; G/160ML
10-3.5-12 MG-GM LIQUID ORAL
Qty: 320 | Refills: 0 | Status: ACTIVE | COMMUNITY
Start: 2021-06-17

## 2021-09-13 RX ORDER — DICLOFENAC SODIUM 75 MG/1
75 TABLET, DELAYED RELEASE ORAL
Qty: 28 | Refills: 0 | Status: DISCONTINUED | COMMUNITY
Start: 2021-04-26 | End: 2021-09-13

## 2021-09-13 RX ORDER — TRAMADOL HYDROCHLORIDE 50 MG/1
50 TABLET, COATED ORAL
Qty: 20 | Refills: 0 | Status: DISCONTINUED | COMMUNITY
Start: 2021-04-16

## 2021-09-13 NOTE — HISTORY OF PRESENT ILLNESS
[FreeTextEntry1] : follow up [de-identified] : KIANNA TAFOYA is a 60 year old F who presents today for follow up for BP, blood sugar, and cholesterol. Pt also complains of finger pain.

## 2021-09-13 NOTE — END OF VISIT
[FreeTextEntry3] : "I, Yves Peres, personally scribed the services dictated to me by Dr. Darrin Edwards MD in this documentation on 09/13/2021 "\par \par "I Dr. Darrin Edwards MD, personally performed the services described in this documentation on 09/13/2021 for the patient as scribed by Yves Peres in my presence. I have reviewed and verified that all the information is accurate and true."\par \par

## 2021-09-13 NOTE — PHYSICAL EXAM
[No Acute Distress] : no acute distress [Well Nourished] : well nourished [Well Developed] : well developed [Well-Appearing] : well-appearing [Normal Voice/Communication] : normal voice/communication [Normal Sclera/Conjunctiva] : normal sclera/conjunctiva [PERRL] : pupils equal round and reactive to light [EOMI] : extraocular movements intact [Normal Outer Ear/Nose] : the outer ears and nose were normal in appearance [No JVD] : no jugular venous distention [No Lymphadenopathy] : no lymphadenopathy [Supple] : supple [Thyroid Normal, No Nodules] : the thyroid was normal and there were no nodules present [No Respiratory Distress] : no respiratory distress  [No Accessory Muscle Use] : no accessory muscle use [Clear to Auscultation] : lungs were clear to auscultation bilaterally [Normal Rate] : normal rate  [Regular Rhythm] : with a regular rhythm [Normal S1, S2] : normal S1 and S2 [No Murmur] : no murmur heard [No Carotid Bruits] : no carotid bruits [No Abdominal Bruit] : a ~M bruit was not heard ~T in the abdomen [No Varicosities] : no varicosities [Pedal Pulses Present] : the pedal pulses are present [No Edema] : there was no peripheral edema [No Palpable Aorta] : no palpable aorta [No Extremity Clubbing/Cyanosis] : no extremity clubbing/cyanosis [Soft] : abdomen soft [Non Tender] : non-tender [Non-distended] : non-distended [No Masses] : no abdominal mass palpated [No HSM] : no HSM [Normal Bowel Sounds] : normal bowel sounds [Normal Supraclavicular Nodes] : no supraclavicular lymphadenopathy [Normal Posterior Cervical Nodes] : no posterior cervical lymphadenopathy [Normal Anterior Cervical Nodes] : no anterior cervical lymphadenopathy [No CVA Tenderness] : no CVA  tenderness [No Spinal Tenderness] : no spinal tenderness [No Joint Swelling] : no joint swelling [Grossly Normal Strength/Tone] : grossly normal strength/tone [No Rash] : no rash [Coordination Grossly Intact] : coordination grossly intact [No Focal Deficits] : no focal deficits [Normal Gait] : normal gait [Deep Tendon Reflexes (DTR)] : deep tendon reflexes were 2+ and symmetric [Speech Grossly Normal] : speech grossly normal [Memory Grossly Normal] : memory grossly normal [Normal Affect] : the affect was normal [Alert and Oriented x3] : oriented to person, place, and time [Normal Mood] : the mood was normal [Normal Insight/Judgement] : insight and judgment were intact [de-identified] : arthritic changes of fingers

## 2021-09-15 LAB
25(OH)D3 SERPL-MCNC: 36.1 NG/ML
ALBUMIN SERPL ELPH-MCNC: 4.6 G/DL
ALP BLD-CCNC: 110 U/L
ALT SERPL-CCNC: 21 U/L
ANION GAP SERPL CALC-SCNC: 16 MMOL/L
AST SERPL-CCNC: 16 U/L
BASOPHILS # BLD AUTO: 0.11 K/UL
BASOPHILS NFR BLD AUTO: 0.8 %
BILIRUB SERPL-MCNC: 0.3 MG/DL
BUN SERPL-MCNC: 16 MG/DL
CALCIUM SERPL-MCNC: 10.3 MG/DL
CHLORIDE SERPL-SCNC: 102 MMOL/L
CHOLEST SERPL-MCNC: 158 MG/DL
CK SERPL-CCNC: 56 U/L
CO2 SERPL-SCNC: 25 MMOL/L
CREAT SERPL-MCNC: 0.57 MG/DL
EOSINOPHIL # BLD AUTO: 0.24 K/UL
EOSINOPHIL NFR BLD AUTO: 1.8 %
ESTIMATED AVERAGE GLUCOSE: 148 MG/DL
GLUCOSE SERPL-MCNC: 111 MG/DL
HBA1C MFR BLD HPLC: 6.8 %
HCT VFR BLD CALC: 42.9 %
HDLC SERPL-MCNC: 23 MG/DL
HGB BLD-MCNC: 13.3 G/DL
IMM GRANULOCYTES NFR BLD AUTO: 0.4 %
LDLC SERPL CALC-MCNC: NORMAL MG/DL
LYMPHOCYTES # BLD AUTO: 2.17 K/UL
LYMPHOCYTES NFR BLD AUTO: 16.4 %
MAN DIFF?: NORMAL
MCHC RBC-ENTMCNC: 27.3 PG
MCHC RBC-ENTMCNC: 31 GM/DL
MCV RBC AUTO: 87.9 FL
MONOCYTES # BLD AUTO: 0.95 K/UL
MONOCYTES NFR BLD AUTO: 7.2 %
NEUTROPHILS # BLD AUTO: 9.71 K/UL
NEUTROPHILS NFR BLD AUTO: 73.4 %
NONHDLC SERPL-MCNC: 135 MG/DL
PLATELET # BLD AUTO: 315 K/UL
POTASSIUM SERPL-SCNC: 4 MMOL/L
PROT SERPL-MCNC: 7.1 G/DL
RBC # BLD: 4.88 M/UL
RBC # FLD: 14.7 %
SODIUM SERPL-SCNC: 143 MMOL/L
TRIGL SERPL-MCNC: 871 MG/DL
TSH SERPL-ACNC: 0.8 UIU/ML
WBC # FLD AUTO: 13.23 K/UL

## 2021-09-15 RX ORDER — ICOSAPENT ETHYL 1000 MG/1
1 CAPSULE ORAL TWICE DAILY
Qty: 360 | Refills: 1 | Status: DISCONTINUED | COMMUNITY
Start: 2020-08-06 | End: 2020-08-07

## 2021-09-17 ENCOUNTER — NON-APPOINTMENT (OUTPATIENT)
Age: 61
End: 2021-09-17

## 2021-09-26 ENCOUNTER — RX RENEWAL (OUTPATIENT)
Age: 61
End: 2021-09-26

## 2021-10-02 ENCOUNTER — RX RENEWAL (OUTPATIENT)
Age: 61
End: 2021-10-02

## 2021-10-28 ENCOUNTER — RX RENEWAL (OUTPATIENT)
Age: 61
End: 2021-10-28

## 2021-11-05 ENCOUNTER — RX RENEWAL (OUTPATIENT)
Age: 61
End: 2021-11-05

## 2021-11-22 ENCOUNTER — RX RENEWAL (OUTPATIENT)
Age: 61
End: 2021-11-22

## 2021-12-14 ENCOUNTER — APPOINTMENT (OUTPATIENT)
Dept: INTERNAL MEDICINE | Facility: CLINIC | Age: 61
End: 2021-12-14

## 2021-12-17 ENCOUNTER — APPOINTMENT (OUTPATIENT)
Dept: INTERNAL MEDICINE | Facility: CLINIC | Age: 61
End: 2021-12-17

## 2021-12-26 ENCOUNTER — RX RENEWAL (OUTPATIENT)
Age: 61
End: 2021-12-26

## 2021-12-28 ENCOUNTER — APPOINTMENT (OUTPATIENT)
Dept: INTERNAL MEDICINE | Facility: CLINIC | Age: 61
End: 2021-12-28
Payer: COMMERCIAL

## 2021-12-28 VITALS — SYSTOLIC BLOOD PRESSURE: 134 MMHG | DIASTOLIC BLOOD PRESSURE: 84 MMHG

## 2021-12-28 VITALS
BODY MASS INDEX: 28.12 KG/M2 | TEMPERATURE: 97.3 F | HEART RATE: 80 BPM | DIASTOLIC BLOOD PRESSURE: 90 MMHG | RESPIRATION RATE: 14 BRPM | HEIGHT: 66 IN | WEIGHT: 175 LBS | SYSTOLIC BLOOD PRESSURE: 142 MMHG | OXYGEN SATURATION: 96 %

## 2021-12-28 DIAGNOSIS — B34.9 VIRAL INFECTION, UNSPECIFIED: ICD-10-CM

## 2021-12-28 PROCEDURE — 99214 OFFICE O/P EST MOD 30 MIN: CPT

## 2021-12-28 NOTE — HEALTH RISK ASSESSMENT
[Former] : Former [No] : In the past 12 months have you used drugs other than those required for medical reasons? No [No falls in past year] : Patient reported no falls in the past year [0] : 2) Feeling down, depressed, or hopeless: Not at all (0) [PHQ-2 Negative - No further assessment needed] : PHQ-2 Negative - No further assessment needed [[id number]] : 0

## 2021-12-28 NOTE — END OF VISIT
[FreeTextEntry3] : "I, Bessie Pinto, personally scribed the services dictated to me by Dr. Darrin Edwards MD in this documentation on 12/28/2021 " \par \par "I Dr. Darrin Edwards MD, personally performed the services described in this documentation on 12/28/2021 for the patient as scribed by Bessie Pinto in my presence. I have reviewed and verified that all the information is accurate and true."\par

## 2021-12-28 NOTE — HISTORY OF PRESENT ILLNESS
[Cold Symptoms] : cold symptoms [Severe] : severe [___ Days ago] : [unfilled] days ago [Episodic] : episodic  [Congestion] : congestion [Sore Throat] : sore throat [Fever] : fever [Worsening] : worsening [FreeTextEntry8] : KIANNA TAFOYA is a 61 year old F who presents today for an acute visit. Pt complains of sinus pressure, scratchy throat for 3 days and Pt had a fever 3 days ago. PT was not tested for COVID.

## 2021-12-29 ENCOUNTER — TRANSCRIPTION ENCOUNTER (OUTPATIENT)
Age: 61
End: 2021-12-29

## 2021-12-29 LAB — SARS-COV-2 N GENE NPH QL NAA+PROBE: DETECTED

## 2022-01-28 ENCOUNTER — RX RENEWAL (OUTPATIENT)
Age: 62
End: 2022-01-28

## 2022-02-01 ENCOUNTER — RX RENEWAL (OUTPATIENT)
Age: 62
End: 2022-02-01

## 2022-02-23 ENCOUNTER — INPATIENT (INPATIENT)
Facility: HOSPITAL | Age: 62
LOS: 3 days | Discharge: HOME | DRG: 642 | End: 2022-02-27
Attending: INTERNAL MEDICINE | Admitting: STUDENT IN AN ORGANIZED HEALTH CARE EDUCATION/TRAINING PROGRAM
Payer: COMMERCIAL

## 2022-02-23 VITALS
HEIGHT: 66 IN | DIASTOLIC BLOOD PRESSURE: 87 MMHG | HEART RATE: 66 BPM | SYSTOLIC BLOOD PRESSURE: 165 MMHG | TEMPERATURE: 98 F | OXYGEN SATURATION: 95 % | RESPIRATION RATE: 16 BRPM | WEIGHT: 175.05 LBS

## 2022-02-23 DIAGNOSIS — I10 ESSENTIAL (PRIMARY) HYPERTENSION: ICD-10-CM

## 2022-02-23 DIAGNOSIS — Z29.9 ENCOUNTER FOR PROPHYLACTIC MEASURES, UNSPECIFIED: ICD-10-CM

## 2022-02-23 DIAGNOSIS — K85.90 ACUTE PANCREATITIS WITHOUT NECROSIS OR INFECTION, UNSPECIFIED: ICD-10-CM

## 2022-02-23 DIAGNOSIS — Z86.018 PERSONAL HISTORY OF OTHER BENIGN NEOPLASM: Chronic | ICD-10-CM

## 2022-02-23 DIAGNOSIS — E11.9 TYPE 2 DIABETES MELLITUS WITHOUT COMPLICATIONS: ICD-10-CM

## 2022-02-23 DIAGNOSIS — E78.1 PURE HYPERGLYCERIDEMIA: ICD-10-CM

## 2022-02-23 DIAGNOSIS — F41.9 ANXIETY DISORDER, UNSPECIFIED: ICD-10-CM

## 2022-02-23 LAB
ALBUMIN SERPL ELPH-MCNC: 3.6 G/DL — SIGNIFICANT CHANGE UP (ref 3.3–5)
ALP SERPL-CCNC: 92 U/L — SIGNIFICANT CHANGE UP (ref 40–120)
ALT FLD-CCNC: 103 U/L — HIGH (ref 12–78)
ANION GAP SERPL CALC-SCNC: 17 MMOL/L — SIGNIFICANT CHANGE UP (ref 5–17)
APPEARANCE UR: ABNORMAL
AST SERPL-CCNC: 260 U/L — HIGH (ref 15–37)
BASOPHILS # BLD AUTO: 0.14 K/UL — SIGNIFICANT CHANGE UP (ref 0–0.2)
BASOPHILS NFR BLD AUTO: 0.7 % — SIGNIFICANT CHANGE UP (ref 0–2)
BILIRUB SERPL-MCNC: 0.9 MG/DL — SIGNIFICANT CHANGE UP (ref 0.2–1.2)
BILIRUB UR-MCNC: NEGATIVE — SIGNIFICANT CHANGE UP
BUN SERPL-MCNC: 10 MG/DL — SIGNIFICANT CHANGE UP (ref 7–23)
CALCIUM SERPL-MCNC: 9.3 MG/DL — SIGNIFICANT CHANGE UP (ref 8.5–10.1)
CHLORIDE SERPL-SCNC: 100 MMOL/L — SIGNIFICANT CHANGE UP (ref 96–108)
CO2 SERPL-SCNC: 18 MMOL/L — LOW (ref 22–31)
COLOR SPEC: YELLOW — SIGNIFICANT CHANGE UP
CREAT SERPL-MCNC: 0.66 MG/DL — SIGNIFICANT CHANGE UP (ref 0.5–1.3)
DIFF PNL FLD: ABNORMAL
EOSINOPHIL # BLD AUTO: 0.19 K/UL — SIGNIFICANT CHANGE UP (ref 0–0.5)
EOSINOPHIL NFR BLD AUTO: 1 % — SIGNIFICANT CHANGE UP (ref 0–6)
GLUCOSE SERPL-MCNC: 199 MG/DL — HIGH (ref 70–99)
GLUCOSE UR QL: NEGATIVE — SIGNIFICANT CHANGE UP
HCT VFR BLD CALC: 41.3 % — SIGNIFICANT CHANGE UP (ref 34.5–45)
HGB BLD-MCNC: 14.5 G/DL — SIGNIFICANT CHANGE UP (ref 11.5–15.5)
IMM GRANULOCYTES NFR BLD AUTO: 0.7 % — SIGNIFICANT CHANGE UP (ref 0–1.5)
KETONES UR-MCNC: NEGATIVE — SIGNIFICANT CHANGE UP
LEUKOCYTE ESTERASE UR-ACNC: NEGATIVE — SIGNIFICANT CHANGE UP
LIDOCAIN IGE QN: 6548 U/L — HIGH (ref 73–393)
LYMPHOCYTES # BLD AUTO: 12.7 % — LOW (ref 13–44)
LYMPHOCYTES # BLD AUTO: 2.51 K/UL — SIGNIFICANT CHANGE UP (ref 1–3.3)
MCHC RBC-ENTMCNC: 28.5 PG — SIGNIFICANT CHANGE UP (ref 27–34)
MCHC RBC-ENTMCNC: 35.1 GM/DL — SIGNIFICANT CHANGE UP (ref 32–36)
MCV RBC AUTO: 81.1 FL — SIGNIFICANT CHANGE UP (ref 80–100)
MONOCYTES # BLD AUTO: 1.28 K/UL — HIGH (ref 0–0.9)
MONOCYTES NFR BLD AUTO: 6.5 % — SIGNIFICANT CHANGE UP (ref 2–14)
NEUTROPHILS # BLD AUTO: 15.47 K/UL — HIGH (ref 1.8–7.4)
NEUTROPHILS NFR BLD AUTO: 78.4 % — HIGH (ref 43–77)
NITRITE UR-MCNC: NEGATIVE — SIGNIFICANT CHANGE UP
NRBC # BLD: 0 /100 WBCS — SIGNIFICANT CHANGE UP (ref 0–0)
PH UR: 5 — SIGNIFICANT CHANGE UP (ref 5–8)
PLATELET # BLD AUTO: 354 K/UL — SIGNIFICANT CHANGE UP (ref 150–400)
POTASSIUM SERPL-MCNC: 4.8 MMOL/L — SIGNIFICANT CHANGE UP (ref 3.5–5.3)
POTASSIUM SERPL-SCNC: 4.8 MMOL/L — SIGNIFICANT CHANGE UP (ref 3.5–5.3)
PROT SERPL-MCNC: 8 G/DL — SIGNIFICANT CHANGE UP (ref 6–8.3)
PROT UR-MCNC: 30 MG/DL
RBC # BLD: 5.09 M/UL — SIGNIFICANT CHANGE UP (ref 3.8–5.2)
RBC # FLD: 15.1 % — HIGH (ref 10.3–14.5)
SARS-COV-2 RNA SPEC QL NAA+PROBE: SIGNIFICANT CHANGE UP
SODIUM SERPL-SCNC: 135 MMOL/L — SIGNIFICANT CHANGE UP (ref 135–145)
SP GR SPEC: 1.02 — SIGNIFICANT CHANGE UP (ref 1.01–1.02)
UROBILINOGEN FLD QL: NEGATIVE — SIGNIFICANT CHANGE UP
WBC # BLD: 19.72 K/UL — HIGH (ref 3.8–10.5)
WBC # FLD AUTO: 19.72 K/UL — HIGH (ref 3.8–10.5)

## 2022-02-23 PROCEDURE — 74177 CT ABD & PELVIS W/CONTRAST: CPT | Mod: 26,MA

## 2022-02-23 PROCEDURE — 99223 1ST HOSP IP/OBS HIGH 75: CPT

## 2022-02-23 PROCEDURE — 99285 EMERGENCY DEPT VISIT HI MDM: CPT

## 2022-02-23 RX ORDER — DEXTROSE 50 % IN WATER 50 %
25 SYRINGE (ML) INTRAVENOUS ONCE
Refills: 0 | Status: DISCONTINUED | OUTPATIENT
Start: 2022-02-23 | End: 2022-02-27

## 2022-02-23 RX ORDER — LIPASE/PROTEASE/AMYLASE 16-48-48K
4 CAPSULE,DELAYED RELEASE (ENTERIC COATED) ORAL
Refills: 0 | Status: DISCONTINUED | OUTPATIENT
Start: 2022-02-23 | End: 2022-02-25

## 2022-02-23 RX ORDER — LANOLIN ALCOHOL/MO/W.PET/CERES
3 CREAM (GRAM) TOPICAL AT BEDTIME
Refills: 0 | Status: DISCONTINUED | OUTPATIENT
Start: 2022-02-23 | End: 2022-02-27

## 2022-02-23 RX ORDER — DEXTROSE 50 % IN WATER 50 %
12.5 SYRINGE (ML) INTRAVENOUS ONCE
Refills: 0 | Status: DISCONTINUED | OUTPATIENT
Start: 2022-02-23 | End: 2022-02-27

## 2022-02-23 RX ORDER — METFORMIN HYDROCHLORIDE 850 MG/1
1 TABLET ORAL
Qty: 0 | Refills: 0 | DISCHARGE

## 2022-02-23 RX ORDER — FENOFIBRATE,MICRONIZED 130 MG
145 CAPSULE ORAL DAILY
Refills: 0 | Status: DISCONTINUED | OUTPATIENT
Start: 2022-02-23 | End: 2022-02-27

## 2022-02-23 RX ORDER — DEXTROSE 50 % IN WATER 50 %
15 SYRINGE (ML) INTRAVENOUS ONCE
Refills: 0 | Status: DISCONTINUED | OUTPATIENT
Start: 2022-02-23 | End: 2022-02-27

## 2022-02-23 RX ORDER — ACETAMINOPHEN 500 MG
650 TABLET ORAL EVERY 6 HOURS
Refills: 0 | Status: DISCONTINUED | OUTPATIENT
Start: 2022-02-23 | End: 2022-02-27

## 2022-02-23 RX ORDER — LIPASE/PROTEASE/AMYLASE 16-48-48K
2 CAPSULE,DELAYED RELEASE (ENTERIC COATED) ORAL
Refills: 0 | Status: DISCONTINUED | OUTPATIENT
Start: 2022-02-23 | End: 2022-02-23

## 2022-02-23 RX ORDER — SODIUM CHLORIDE 9 MG/ML
1000 INJECTION, SOLUTION INTRAVENOUS
Refills: 0 | Status: DISCONTINUED | OUTPATIENT
Start: 2022-02-23 | End: 2022-02-27

## 2022-02-23 RX ORDER — PIPERACILLIN AND TAZOBACTAM 4; .5 G/20ML; G/20ML
3.38 INJECTION, POWDER, LYOPHILIZED, FOR SOLUTION INTRAVENOUS ONCE
Refills: 0 | Status: COMPLETED | OUTPATIENT
Start: 2022-02-23 | End: 2022-02-23

## 2022-02-23 RX ORDER — INSULIN LISPRO 100/ML
VIAL (ML) SUBCUTANEOUS AT BEDTIME
Refills: 0 | Status: DISCONTINUED | OUTPATIENT
Start: 2022-02-23 | End: 2022-02-24

## 2022-02-23 RX ORDER — ALPRAZOLAM 0.25 MG
0.5 TABLET ORAL AT BEDTIME
Refills: 0 | Status: DISCONTINUED | OUTPATIENT
Start: 2022-02-23 | End: 2022-02-26

## 2022-02-23 RX ORDER — SODIUM CHLORIDE 9 MG/ML
1000 INJECTION INTRAMUSCULAR; INTRAVENOUS; SUBCUTANEOUS ONCE
Refills: 0 | Status: COMPLETED | OUTPATIENT
Start: 2022-02-23 | End: 2022-02-23

## 2022-02-23 RX ORDER — HYDROMORPHONE HYDROCHLORIDE 2 MG/ML
1 INJECTION INTRAMUSCULAR; INTRAVENOUS; SUBCUTANEOUS EVERY 4 HOURS
Refills: 0 | Status: DISCONTINUED | OUTPATIENT
Start: 2022-02-23 | End: 2022-02-26

## 2022-02-23 RX ORDER — ONDANSETRON 8 MG/1
4 TABLET, FILM COATED ORAL EVERY 8 HOURS
Refills: 0 | Status: DISCONTINUED | OUTPATIENT
Start: 2022-02-23 | End: 2022-02-27

## 2022-02-23 RX ORDER — MORPHINE SULFATE 50 MG/1
2 CAPSULE, EXTENDED RELEASE ORAL EVERY 4 HOURS
Refills: 0 | Status: DISCONTINUED | OUTPATIENT
Start: 2022-02-23 | End: 2022-02-26

## 2022-02-23 RX ORDER — INSULIN LISPRO 100/ML
VIAL (ML) SUBCUTANEOUS
Refills: 0 | Status: DISCONTINUED | OUTPATIENT
Start: 2022-02-23 | End: 2022-02-24

## 2022-02-23 RX ORDER — SODIUM CHLORIDE 9 MG/ML
1000 INJECTION, SOLUTION INTRAVENOUS
Refills: 0 | Status: DISCONTINUED | OUTPATIENT
Start: 2022-02-23 | End: 2022-02-23

## 2022-02-23 RX ORDER — MORPHINE SULFATE 50 MG/1
4 CAPSULE, EXTENDED RELEASE ORAL ONCE
Refills: 0 | Status: DISCONTINUED | OUTPATIENT
Start: 2022-02-23 | End: 2022-02-23

## 2022-02-23 RX ORDER — ONDANSETRON 8 MG/1
4 TABLET, FILM COATED ORAL ONCE
Refills: 0 | Status: COMPLETED | OUTPATIENT
Start: 2022-02-23 | End: 2022-02-23

## 2022-02-23 RX ORDER — ATENOLOL 25 MG/1
25 TABLET ORAL DAILY
Refills: 0 | Status: DISCONTINUED | OUTPATIENT
Start: 2022-02-23 | End: 2022-02-27

## 2022-02-23 RX ORDER — SODIUM CHLORIDE 9 MG/ML
1000 INJECTION, SOLUTION INTRAVENOUS
Refills: 0 | Status: DISCONTINUED | OUTPATIENT
Start: 2022-02-23 | End: 2022-02-24

## 2022-02-23 RX ORDER — GLUCAGON INJECTION, SOLUTION 0.5 MG/.1ML
1 INJECTION, SOLUTION SUBCUTANEOUS ONCE
Refills: 0 | Status: DISCONTINUED | OUTPATIENT
Start: 2022-02-23 | End: 2022-02-27

## 2022-02-23 RX ADMIN — MORPHINE SULFATE 4 MILLIGRAM(S): 50 CAPSULE, EXTENDED RELEASE ORAL at 20:48

## 2022-02-23 RX ADMIN — SODIUM CHLORIDE 1000 MILLILITER(S): 9 INJECTION INTRAMUSCULAR; INTRAVENOUS; SUBCUTANEOUS at 18:00

## 2022-02-23 RX ADMIN — SODIUM CHLORIDE 1000 MILLILITER(S): 9 INJECTION INTRAMUSCULAR; INTRAVENOUS; SUBCUTANEOUS at 17:48

## 2022-02-23 RX ADMIN — ONDANSETRON 4 MILLIGRAM(S): 8 TABLET, FILM COATED ORAL at 17:47

## 2022-02-23 RX ADMIN — Medication 0.5 MILLIGRAM(S): at 23:16

## 2022-02-23 RX ADMIN — MORPHINE SULFATE 2 MILLIGRAM(S): 50 CAPSULE, EXTENDED RELEASE ORAL at 23:16

## 2022-02-23 RX ADMIN — MORPHINE SULFATE 4 MILLIGRAM(S): 50 CAPSULE, EXTENDED RELEASE ORAL at 19:31

## 2022-02-23 RX ADMIN — ONDANSETRON 4 MILLIGRAM(S): 8 TABLET, FILM COATED ORAL at 23:16

## 2022-02-23 RX ADMIN — PIPERACILLIN AND TAZOBACTAM 200 GRAM(S): 4; .5 INJECTION, POWDER, LYOPHILIZED, FOR SOLUTION INTRAVENOUS at 18:44

## 2022-02-23 RX ADMIN — MORPHINE SULFATE 4 MILLIGRAM(S): 50 CAPSULE, EXTENDED RELEASE ORAL at 17:48

## 2022-02-23 RX ADMIN — HYDROMORPHONE HYDROCHLORIDE 1 MILLIGRAM(S): 2 INJECTION INTRAMUSCULAR; INTRAVENOUS; SUBCUTANEOUS at 21:24

## 2022-02-23 NOTE — ED ADULT TRIAGE NOTE - CHIEF COMPLAINT QUOTE
c/o abdominal pain for a while,had CT scan last week that shows mild divertulitis and put on antibiotics with no relief

## 2022-02-23 NOTE — ED PROVIDER NOTE - OBJECTIVE STATEMENT
Pt is a 62 yo female with pmhx of chronic diverticulitis DM HTN here for diffuse abdominal pain nausea and diarrhea but today feels constipated for about 2 weeks. Pt states she had ct scan of abdomen showing mild acute diverticulitis at [facility] on 2/17 completed abx augmentin one week ago. Pt c/o of chills no urinary symptoms no chest pain no sob. Pt states abdomen chronically bloated.    gi panthicol

## 2022-02-23 NOTE — H&P ADULT - PROBLEM SELECTOR PLAN 5
continue home atenolol with hold parameters  home indapamide not on formulary, patient to have spouse bring in tomorrow

## 2022-02-23 NOTE — H&P ADULT - HISTORY OF PRESENT ILLNESS
60 yo female with pmhx of chronic diverticulitis, type 2 DM, HTN, hypertriglyceridemia, chronic pancreatitis presents with complaint of diffuse abdominal pain. Patient states that for the last two weeks she has had abdominal pain, nausea and diarrhea. Today she began having worsening pain which she thought may be related to her diverticulitis Patient states she had ct scan of abdomen showing mild acute diverticulitis at [facility] on 2/17. She completed an Augmentin course one week ago. Pt c/o of chills.  no urinary symptoms no chest pain no sob. Pt states abdomen chronically bloated.    In the ed  WBC 19.72, , , lipase 6548. Unable to complete lab work due to lipemia, sample sent to [place] for processing   CT abdomen/pelvis: Acute interstitial edematous pancreatitis  Given zosyn x1, 1000cc NS x1, morphine 4 mg x 2.

## 2022-02-23 NOTE — ED ADULT NURSE NOTE - NS ED NURSE REPORT GIVEN DT
I have reviewed discharge instructions with the patient. The patient verbalized understanding. Patient left ED via Discharge Method: ambulatory to Home with family. Opportunity for questions and clarification provided. Patient given 0 scripts. Work note provided. To continue your aftercare when you leave the hospital, you may receive an automated call from our care team to check in on how you are doing. This is a free service and part of our promise to provide the best care and service to meet your aftercare needs.  If you have questions, or wish to unsubscribe from this service please call [phone]. Thank you for Choosing our [facility] Emergency Department.
24-Feb-2022 00:21

## 2022-02-23 NOTE — ED PROVIDER NOTE - NSICDXFAMILYHX_GEN_ALL_CORE_FT
FAMILY HISTORY:  Father  Still living? Unknown  Family history of diabetes mellitus (DM), Age at diagnosis: Age Unknown    Mother  Still living? Yes, Estimated age: Age Unknown  Family history of breast cancer, Age at diagnosis: Age Unknown

## 2022-02-23 NOTE — H&P ADULT - ASSESSMENT
62 yo female with pmhx of chronic diverticulitis, type 2 DM, HTN, hypertriglyceridemia, chronic pancreatitis presents with complaint of diffuse abdominal pain. Admitted with acute pancreatitis.

## 2022-02-23 NOTE — H&P ADULT - PROBLEM SELECTOR PLAN 2
patient states she has severely elevated triglycerides  she has been on fenofibrate at home but states "it stopped working"  Unable to calculate triglycerides given severe lipemia, sample sent to core lab for processing  continue home fenofibrate  pending triglyceride count. If severely elevated will likely need ICU for close monitoring and insulin drip   fu am lipid panel  DW core labs, triglycerides will be urgently processed once received, currently in transit

## 2022-02-23 NOTE — H&P ADULT - PROBLEM SELECTOR PLAN 3
Diabetes type II (not on home insulin)  Hold oral hypoglycemic meds  Insulin Corrective Scale  Finger sticks per routine  Consistent Carb Diet when able to tolerate PO   Hemoglobin A1c in AM  Hypoglycemia protocol

## 2022-02-23 NOTE — H&P ADULT - TIME BILLING
direct patient care, review of labs and imaging, vs, gathering patient history, discussion with ED, RN, ICU, core lab, patient and .

## 2022-02-23 NOTE — H&P ADULT - NSHPPHYSICALEXAM_GEN_ALL_CORE
T(C): 36.6 (02-23-22 @ 16:54), Max: 36.6 (02-23-22 @ 16:54)  HR: 66 (02-23-22 @ 16:54) (66 - 66)  BP: 165/87 (02-23-22 @ 16:54) (165/87 - 165/87)  RR: 16 (02-23-22 @ 16:54) (16 - 16)  SpO2: 95% (02-23-22 @ 16:54) (95% - 95%)    GENERAL: patient appears well, no acute distress, appropriate, pleasant  EYES: sclera clear, no exudates  ENMT: oropharynx clear without erythema, no exudates, moist mucous membranes  NECK: supple, soft, no thyromegaly noted  LUNGS: good air entry bilaterally, clear to auscultation, symmetric breath sounds, no wheezing or rhonchi appreciated  HEART: soft S1/S2, regular rate and rhythm, no murmurs noted, no lower extremity edema  GASTROINTESTINAL: abdomen is soft, nondistended, normoactive bowel sounds, no palpable masses. exquisite TTP in epigastrium   INTEGUMENT: good skin turgor, no lesions noted  MUSCULOSKELETAL: no clubbing or cyanosis, no obvious deformity  NEUROLOGIC: awake, alert, oriented x3, good muscle tone in 4 extremities, no obvious sensory deficits  PSYCHIATRIC: mood is good, affect is congruent, linear and logical thought process  HEME/LYMPH: no palpable supraclavicular nodules, no obvious ecchymosis or petechiae

## 2022-02-23 NOTE — H&P ADULT - PROBLEM SELECTOR PLAN 1
patient with abdominal pain, nausea and vomiting   CT with Acute interstitial edematous pancreatitis  V[place]nu score on admission 3 (WBC>16, age >55, AST>250)- severe pancreatitis likely.   Unable to calculate triglycerides given lipemia, sample sent to core lab for processing.   admit to telemetry pending triglyceride count. If elevated will likely need ICU for close monitoring and insulin drip   IVF at 150cc/hr  pain control as ordered, monitor response to pain and adjust as necessary   DW ICU, will evaluate patient when triglyceride levels result   DW core labs, triglycerides will be urgently processed once received, currently in transit  GI Dr. Marshall consulted, fu recs patient with abdominal pain, nausea and vomiting   CT with Acute interstitial edematous pancreatitis likely 2/2 hypertriglyceridemia  patient reports multiple episodes in the past, all 2/2 hypertriglyceridemia  Vishnu score on admission 3 (WBC>16, age >55, AST>250)- severe pancreatitis likely.   Unable to calculate triglycerides given lipemia, sample sent to core lab for processing.   admit to telemetry pending triglyceride count. If elevated will likely need ICU for close monitoring and insulin drip   IVF at 150cc/hr  pain control as ordered, monitor response to pain and adjust as necessary   DW ICU, will evaluate patient when triglyceride levels result   DW core labs, triglycerides will be urgently processed once received, currently in transit  continue home creon   GI Dr. Marshall consulted, fu recs

## 2022-02-23 NOTE — H&P ADULT - NSHPREVIEWOFSYSTEMS_GEN_ALL_CORE
CONSTITUTIONAL: denies fever, chills, fatigue, weakness  HEENT: denies blurred vision, sore throat  SKIN: denies new lesions, rash  CARDIOVASCULAR: denies chest pain, chest pressure, palpitations  RESPIRATORY: denies shortness of breath, sputum production  GASTROINTESTINAL: admits abdominal pain, nausea, vomiting, constipation   GENITOURINARY: denies dysuria, discharge  NEUROLOGICAL: denies numbness, headache, focal weakness  MUSCULOSKELETAL: denies new joint pain, muscle aches  HEMATOLOGIC: denies gross bleeding, bruising  LYMPHATICS: denies enlarged lymph nodes, extremity swelling  PSYCHIATRIC: denies recent changes in anxiety, depression  ENDOCRINOLOGIC: denies sweating, cold or heat intolerance

## 2022-02-23 NOTE — ED PROVIDER NOTE - NSICDXPASTMEDICALHX_GEN_ALL_CORE_FT
PAST MEDICAL HISTORY:  Anxiety     Chronic pancreatitis, unspecified pancreatitis type     Diabetes     HTN (hypertension)

## 2022-02-23 NOTE — ED PROVIDER NOTE - ATTENDING CONTRIBUTION TO CARE
62 yo female with pmhx of chronic pancreatitis, diverticulitis DM HTN here for diffuse abdominal pain nausea and diarrhea but today feels constipated for about 2 weeks. Pt states she had ct scan of abdomen showing mild acute diverticulitis at [facility] on 2/17 completed abx augmentin one week ago. Pt c/o of chills no urinary symptoms no chest pain no sob. Pt states abdomen chronically bloated.  on exam uncomfortable, vomiting, tender in epigasrtium  ro complicated divertic, pancreatits     gi panthicol

## 2022-02-24 ENCOUNTER — TRANSCRIPTION ENCOUNTER (OUTPATIENT)
Age: 62
End: 2022-02-24

## 2022-02-24 LAB
A1C WITH ESTIMATED AVERAGE GLUCOSE RESULT: 6.9 % — HIGH (ref 4–5.6)
ALBUMIN SERPL ELPH-MCNC: 2.9 G/DL — LOW (ref 3.3–5)
ALBUMIN SERPL ELPH-MCNC: 3.2 G/DL — LOW (ref 3.3–5)
ALP SERPL-CCNC: 64 U/L — SIGNIFICANT CHANGE UP (ref 40–120)
ALP SERPL-CCNC: 77 U/L — SIGNIFICANT CHANGE UP (ref 40–120)
ALT FLD-CCNC: 32 U/L — SIGNIFICANT CHANGE UP (ref 12–78)
ALT FLD-CCNC: 64 U/L — SIGNIFICANT CHANGE UP (ref 12–78)
ANION GAP SERPL CALC-SCNC: 12 MMOL/L — SIGNIFICANT CHANGE UP (ref 5–17)
ANION GAP SERPL CALC-SCNC: 6 MMOL/L — SIGNIFICANT CHANGE UP (ref 5–17)
AST SERPL-CCNC: 31 U/L — SIGNIFICANT CHANGE UP (ref 15–37)
AST SERPL-CCNC: 9 U/L — LOW (ref 15–37)
BASE EXCESS BLDV CALC-SCNC: 5.7 MMOL/L — HIGH (ref -2–3)
BASOPHILS # BLD AUTO: 0.09 K/UL — SIGNIFICANT CHANGE UP (ref 0–0.2)
BASOPHILS NFR BLD AUTO: 0.5 % — SIGNIFICANT CHANGE UP (ref 0–2)
BILIRUB SERPL-MCNC: 0.4 MG/DL — SIGNIFICANT CHANGE UP (ref 0.2–1.2)
BILIRUB SERPL-MCNC: 0.7 MG/DL — SIGNIFICANT CHANGE UP (ref 0.2–1.2)
BLOOD GAS COMMENTS, VENOUS: SIGNIFICANT CHANGE UP
BUN SERPL-MCNC: 10 MG/DL — SIGNIFICANT CHANGE UP (ref 7–23)
BUN SERPL-MCNC: 10 MG/DL — SIGNIFICANT CHANGE UP (ref 7–23)
CALCIUM SERPL-MCNC: 8.8 MG/DL — SIGNIFICANT CHANGE UP (ref 8.5–10.1)
CALCIUM SERPL-MCNC: 8.9 MG/DL — SIGNIFICANT CHANGE UP (ref 8.5–10.1)
CHLORIDE SERPL-SCNC: 100 MMOL/L — SIGNIFICANT CHANGE UP (ref 96–108)
CHLORIDE SERPL-SCNC: 104 MMOL/L — SIGNIFICANT CHANGE UP (ref 96–108)
CHOLEST SERPL-MCNC: 391 MG/DL — HIGH
CO2 SERPL-SCNC: 24 MMOL/L — SIGNIFICANT CHANGE UP (ref 22–31)
CO2 SERPL-SCNC: 30 MMOL/L — SIGNIFICANT CHANGE UP (ref 22–31)
CREAT SERPL-MCNC: 0.56 MG/DL — SIGNIFICANT CHANGE UP (ref 0.5–1.3)
CREAT SERPL-MCNC: 0.63 MG/DL — SIGNIFICANT CHANGE UP (ref 0.5–1.3)
EOSINOPHIL # BLD AUTO: 0.09 K/UL — SIGNIFICANT CHANGE UP (ref 0–0.5)
EOSINOPHIL NFR BLD AUTO: 0.5 % — SIGNIFICANT CHANGE UP (ref 0–6)
ESTIMATED AVERAGE GLUCOSE: 151 MG/DL — HIGH (ref 68–114)
GLUCOSE SERPL-MCNC: 155 MG/DL — HIGH (ref 70–99)
GLUCOSE SERPL-MCNC: 189 MG/DL — HIGH (ref 70–99)
HCO3 BLDV-SCNC: 30 MMOL/L — HIGH (ref 22–29)
HCT VFR BLD CALC: 38.4 % — SIGNIFICANT CHANGE UP (ref 34.5–45)
HCV AB S/CO SERPL IA: 0.34 S/CO — SIGNIFICANT CHANGE UP (ref 0–0.99)
HCV AB SERPL-IMP: SIGNIFICANT CHANGE UP
HDLC SERPL-MCNC: 18 MG/DL — LOW
HGB BLD-MCNC: 12.9 G/DL — SIGNIFICANT CHANGE UP (ref 11.5–15.5)
IMM GRANULOCYTES NFR BLD AUTO: 0.5 % — SIGNIFICANT CHANGE UP (ref 0–1.5)
INR BLD: 1.14 RATIO — SIGNIFICANT CHANGE UP (ref 0.88–1.16)
LACTATE SERPL-SCNC: 1.2 MMOL/L — SIGNIFICANT CHANGE UP (ref 0.7–2)
LIDOCAIN IGE QN: 2081 U/L — HIGH (ref 73–393)
LIPID PNL WITH DIRECT LDL SERPL: SIGNIFICANT CHANGE UP MG/DL
LYMPHOCYTES # BLD AUTO: 0.93 K/UL — LOW (ref 1–3.3)
LYMPHOCYTES # BLD AUTO: 5.3 % — LOW (ref 13–44)
MAGNESIUM SERPL-MCNC: 1.7 MG/DL — SIGNIFICANT CHANGE UP (ref 1.6–2.6)
MCHC RBC-ENTMCNC: 27.3 PG — SIGNIFICANT CHANGE UP (ref 27–34)
MCHC RBC-ENTMCNC: 33.6 GM/DL — SIGNIFICANT CHANGE UP (ref 32–36)
MCV RBC AUTO: 81.4 FL — SIGNIFICANT CHANGE UP (ref 80–100)
MONOCYTES # BLD AUTO: 1.15 K/UL — HIGH (ref 0–0.9)
MONOCYTES NFR BLD AUTO: 6.6 % — SIGNIFICANT CHANGE UP (ref 2–14)
NEUTROPHILS # BLD AUTO: 15.1 K/UL — HIGH (ref 1.8–7.4)
NEUTROPHILS NFR BLD AUTO: 86.6 % — HIGH (ref 43–77)
NON HDL CHOLESTEROL: 373 MG/DL — HIGH
NRBC # BLD: 0 /100 WBCS — SIGNIFICANT CHANGE UP (ref 0–0)
PCO2 BLDV: 44 MMHG — HIGH (ref 39–42)
PH BLDV: 7.44 — HIGH (ref 7.32–7.43)
PHOSPHATE SERPL-MCNC: 2.3 MG/DL — LOW (ref 2.5–4.5)
PLATELET # BLD AUTO: 294 K/UL — SIGNIFICANT CHANGE UP (ref 150–400)
PO2 BLDV: 68 MMHG — HIGH (ref 25–45)
POTASSIUM SERPL-MCNC: 2.7 MMOL/L — CRITICAL LOW (ref 3.5–5.3)
POTASSIUM SERPL-MCNC: 4 MMOL/L — SIGNIFICANT CHANGE UP (ref 3.5–5.3)
POTASSIUM SERPL-SCNC: 2.7 MMOL/L — CRITICAL LOW (ref 3.5–5.3)
POTASSIUM SERPL-SCNC: 4 MMOL/L — SIGNIFICANT CHANGE UP (ref 3.5–5.3)
PROT SERPL-MCNC: 6.2 G/DL — SIGNIFICANT CHANGE UP (ref 6–8.3)
PROT SERPL-MCNC: 7.2 G/DL — SIGNIFICANT CHANGE UP (ref 6–8.3)
PROTHROM AB SERPL-ACNC: 13.4 SEC — SIGNIFICANT CHANGE UP (ref 10.5–13.4)
RBC # BLD: 4.72 M/UL — SIGNIFICANT CHANGE UP (ref 3.8–5.2)
RBC # FLD: 15.3 % — HIGH (ref 10.3–14.5)
SAO2 % BLDV: 95.2 % — HIGH (ref 67–88)
SODIUM SERPL-SCNC: 136 MMOL/L — SIGNIFICANT CHANGE UP (ref 135–145)
SODIUM SERPL-SCNC: 140 MMOL/L — SIGNIFICANT CHANGE UP (ref 135–145)
TRIGL SERPL-MCNC: 2001 MG/DL — HIGH
TRIGL SERPL-MCNC: 3327 MG/DL — HIGH
WBC # BLD: 17.45 K/UL — HIGH (ref 3.8–10.5)
WBC # FLD AUTO: 17.45 K/UL — HIGH (ref 3.8–10.5)

## 2022-02-24 PROCEDURE — 99291 CRITICAL CARE FIRST HOUR: CPT

## 2022-02-24 PROCEDURE — 99233 SBSQ HOSP IP/OBS HIGH 50: CPT

## 2022-02-24 RX ORDER — ENOXAPARIN SODIUM 100 MG/ML
40 INJECTION SUBCUTANEOUS DAILY
Refills: 0 | Status: DISCONTINUED | OUTPATIENT
Start: 2022-02-24 | End: 2022-02-27

## 2022-02-24 RX ORDER — ALPRAZOLAM 0.25 MG
1 TABLET ORAL
Qty: 0 | Refills: 0 | DISCHARGE

## 2022-02-24 RX ORDER — INDAPAMIDE 1.25 MG
1 TABLET ORAL
Qty: 0 | Refills: 0 | DISCHARGE

## 2022-02-24 RX ORDER — PREGABALIN 225 MG/1
1 CAPSULE ORAL
Qty: 0 | Refills: 0 | DISCHARGE

## 2022-02-24 RX ORDER — INSULIN HUMAN 100 [IU]/ML
4 INJECTION, SOLUTION SUBCUTANEOUS
Qty: 50 | Refills: 0 | Status: DISCONTINUED | OUTPATIENT
Start: 2022-02-24 | End: 2022-02-25

## 2022-02-24 RX ORDER — MAGNESIUM SULFATE 500 MG/ML
2 VIAL (ML) INJECTION ONCE
Refills: 0 | Status: COMPLETED | OUTPATIENT
Start: 2022-02-24 | End: 2022-02-24

## 2022-02-24 RX ORDER — POTASSIUM CHLORIDE 20 MEQ
40 PACKET (EA) ORAL EVERY 4 HOURS
Refills: 0 | Status: COMPLETED | OUTPATIENT
Start: 2022-02-24 | End: 2022-02-25

## 2022-02-24 RX ORDER — FENOFIBRATE,MICRONIZED 130 MG
1 CAPSULE ORAL
Qty: 0 | Refills: 0 | DISCHARGE

## 2022-02-24 RX ORDER — ZINC ACETATE DIHYDRATE 100 %
0 CRYSTALS MISCELLANEOUS
Qty: 0 | Refills: 0 | DISCHARGE

## 2022-02-24 RX ORDER — POTASSIUM PHOSPHATE, MONOBASIC POTASSIUM PHOSPHATE, DIBASIC 236; 224 MG/ML; MG/ML
30 INJECTION, SOLUTION INTRAVENOUS ONCE
Refills: 0 | Status: COMPLETED | OUTPATIENT
Start: 2022-02-24 | End: 2022-02-24

## 2022-02-24 RX ORDER — LIPASE/PROTEASE/AMYLASE 16-48-48K
2 CAPSULE,DELAYED RELEASE (ENTERIC COATED) ORAL
Qty: 0 | Refills: 0 | DISCHARGE

## 2022-02-24 RX ORDER — METOCLOPRAMIDE HCL 10 MG
10 TABLET ORAL ONCE
Refills: 0 | Status: COMPLETED | OUTPATIENT
Start: 2022-02-24 | End: 2022-02-24

## 2022-02-24 RX ORDER — OMEGA-3 ACID ETHYL ESTERS 1 G
0 CAPSULE ORAL
Qty: 0 | Refills: 0 | DISCHARGE

## 2022-02-24 RX ORDER — DEXTROSE 50 % IN WATER 50 %
25 SYRINGE (ML) INTRAVENOUS
Refills: 0 | Status: DISCONTINUED | OUTPATIENT
Start: 2022-02-24 | End: 2022-02-27

## 2022-02-24 RX ORDER — DEXTROSE 50 % IN WATER 50 %
50 SYRINGE (ML) INTRAVENOUS
Refills: 0 | Status: DISCONTINUED | OUTPATIENT
Start: 2022-02-24 | End: 2022-02-27

## 2022-02-24 RX ORDER — ONDANSETRON 8 MG/1
4 TABLET, FILM COATED ORAL ONCE
Refills: 0 | Status: COMPLETED | OUTPATIENT
Start: 2022-02-24 | End: 2022-02-24

## 2022-02-24 RX ORDER — SODIUM CHLORIDE 9 MG/ML
1000 INJECTION, SOLUTION INTRAVENOUS
Refills: 0 | Status: DISCONTINUED | OUTPATIENT
Start: 2022-02-24 | End: 2022-02-25

## 2022-02-24 RX ORDER — ACETAMINOPHEN 500 MG
650 TABLET ORAL ONCE
Refills: 0 | Status: COMPLETED | OUTPATIENT
Start: 2022-02-24 | End: 2022-02-24

## 2022-02-24 RX ORDER — CHOLECALCIFEROL (VITAMIN D3) 125 MCG
1 CAPSULE ORAL
Qty: 0 | Refills: 0 | DISCHARGE

## 2022-02-24 RX ORDER — METFORMIN HYDROCHLORIDE 850 MG/1
1 TABLET ORAL
Qty: 0 | Refills: 0 | DISCHARGE

## 2022-02-24 RX ORDER — ALPRAZOLAM 0.25 MG
0.5 TABLET ORAL ONCE
Refills: 0 | Status: DISCONTINUED | OUTPATIENT
Start: 2022-02-24 | End: 2022-02-24

## 2022-02-24 RX ORDER — FERROUS SULFATE 325(65) MG
0 TABLET ORAL
Qty: 0 | Refills: 0 | DISCHARGE

## 2022-02-24 RX ADMIN — Medication 650 MILLIGRAM(S): at 20:30

## 2022-02-24 RX ADMIN — Medication 40 MILLIEQUIVALENT(S): at 22:18

## 2022-02-24 RX ADMIN — ONDANSETRON 4 MILLIGRAM(S): 8 TABLET, FILM COATED ORAL at 05:11

## 2022-02-24 RX ADMIN — ONDANSETRON 4 MILLIGRAM(S): 8 TABLET, FILM COATED ORAL at 08:16

## 2022-02-24 RX ADMIN — Medication 650 MILLIGRAM(S): at 19:30

## 2022-02-24 RX ADMIN — INSULIN HUMAN 4 UNIT(S)/HR: 100 INJECTION, SOLUTION SUBCUTANEOUS at 14:26

## 2022-02-24 RX ADMIN — Medication 10 MILLIGRAM(S): at 10:43

## 2022-02-24 RX ADMIN — SODIUM CHLORIDE 150 MILLILITER(S): 9 INJECTION, SOLUTION INTRAVENOUS at 04:41

## 2022-02-24 RX ADMIN — Medication 3 MILLIGRAM(S): at 23:19

## 2022-02-24 RX ADMIN — SODIUM CHLORIDE 150 MILLILITER(S): 9 INJECTION, SOLUTION INTRAVENOUS at 08:47

## 2022-02-24 RX ADMIN — HYDROMORPHONE HYDROCHLORIDE 1 MILLIGRAM(S): 2 INJECTION INTRAMUSCULAR; INTRAVENOUS; SUBCUTANEOUS at 05:07

## 2022-02-24 RX ADMIN — HYDROMORPHONE HYDROCHLORIDE 1 MILLIGRAM(S): 2 INJECTION INTRAMUSCULAR; INTRAVENOUS; SUBCUTANEOUS at 00:20

## 2022-02-24 RX ADMIN — HYDROMORPHONE HYDROCHLORIDE 1 MILLIGRAM(S): 2 INJECTION INTRAMUSCULAR; INTRAVENOUS; SUBCUTANEOUS at 05:22

## 2022-02-24 RX ADMIN — POTASSIUM PHOSPHATE, MONOBASIC POTASSIUM PHOSPHATE, DIBASIC 83.33 MILLIMOLE(S): 236; 224 INJECTION, SOLUTION INTRAVENOUS at 22:46

## 2022-02-24 RX ADMIN — INSULIN HUMAN 4 UNIT(S)/HR: 100 INJECTION, SOLUTION SUBCUTANEOUS at 08:13

## 2022-02-24 RX ADMIN — Medication 0.5 MILLIGRAM(S): at 21:58

## 2022-02-24 RX ADMIN — MORPHINE SULFATE 2 MILLIGRAM(S): 50 CAPSULE, EXTENDED RELEASE ORAL at 00:20

## 2022-02-24 RX ADMIN — ENOXAPARIN SODIUM 40 MILLIGRAM(S): 100 INJECTION SUBCUTANEOUS at 11:44

## 2022-02-24 RX ADMIN — Medication 0.5 MILLIGRAM(S): at 23:19

## 2022-02-24 RX ADMIN — Medication 25 GRAM(S): at 22:18

## 2022-02-24 NOTE — PROGRESS NOTE ADULT - PROBLEM SELECTOR PLAN 3
chronic, stable  -Hold oral hypoglycemic meds while in patient  -Insulin Corrective Scale  -Finger sticks per routine, Hypoglycemia protocol  Consistent Carb Diet when able to tolerate PO   Hemoglobin A1c pending chronic, stable  -Hold oral hypoglycemic meds while in patient  -currently on insulin drip, apprec ICU plan of care and monitoring  -Finger sticks per routine, Hypoglycemia protocol  Consistent Carb Diet when able to tolerate PO   Hemoglobin A1c 6.9

## 2022-02-24 NOTE — PROVIDER CONTACT NOTE (EICU) - RECOMMENDATIONS
Plan  1. Start insulin gtt at 5u/hr  2. FS Q1 hr for now  3. NPO  4. COntinue LR at 150cc/hr  5. Monitor lipase, triglyceride level Q8 for now, consider to stop insulin gtt once triglyceride around 500

## 2022-02-24 NOTE — DIETITIAN INITIAL EVALUATION ADULT. - PROBLEM SELECTOR PROBLEM 2
Gen: Awake, Alert, WD, WN, NAD  Head:  NC/AT  Eyes:  PERRL, EOMI, Conjunctiva pink, lids normal, no scleral icterus  ENT: OP clear, moist mucus membranes  Neck: supple, nontender, , no JVD, trachea midline  Cardiac/CV:  S1 S2, RRR, no M/G/R, +AICD right upper ant chest wall.  Respiratory/Pulm:  CTAB, good air movement, normal resp effort, no wheezes/stridor/retractions/rales/rhonchi  Gastrointestinal/Abdomen:  Soft, nontender, nondistended, +BS  Back:  no CVAT, no MLT  Ext:  warm, well perfused, moving all extremities spontaneously, no peripheral edema, distal pulses intact  Skin: intact, no rash  Neuro:  AAOx3, sensation intact, motor 5/5 x 4 extremities, speech clear
Hypertriglyceridemia

## 2022-02-24 NOTE — CONSULT NOTE ADULT - ASSESSMENT
Abdominal pain  Pancreatitis  Elevated triglycerides  Recent diverticulitis    CT noted, results d/w patient  No reported diverticulitis on CT  Reports prior hx of pancreatitis 2/2 hypertriglyceridemia  Triglycerides currently trending down  Insulin gtt  NPO  IVF  Creon, fenofibrate  Will follow    I reviewed the overnight course of events on the unit, re-confirming the patient history. I discussed the care with the patient and their family  Differential diagnosis and plan of care discussed with patient after the evaluation  35 minutes spent on total encounter of which more than fifty percent of the encounter was spent counseling and/or coordinating care by the attending physician.  Advanced care planning was discussed with patient and family.  Advanced care planning forms were reviewed and discussed.  Risks, benefits and alternatives of gastroenterologic procedures were discussed in detail and all questions were answered.

## 2022-02-24 NOTE — PROGRESS NOTE ADULT - SUBJECTIVE AND OBJECTIVE BOX
Interval events: Transferred to ICU after TGs came back 3327 in setting of pancreatitis. Started on insulin gtt. Reporting ongoing nausea. Pain well controlled.     Review of Systems:  Constitutional: no fever, chills, fatigue  Neuro: no headache, numbness, weakness  Resp: no cough, wheezing, shortness of breath  CVS: no chest pain, palpitations, leg swelling  GI: abdominal pain, nausea; no vomiting, diarrhea   : no dysuria, frequency, incontinence  Skin: no itching, burning, rashes, or lesions   Msk: no joint pain or swelling  Psych: no depression, anxiety    T(F): 97.8 (22 @ 08:00), Max: 98.9 (22 @ 23:40)  HR: 94 (22 @ 10:40) (66 - 100)  BP: 123/70 (22 @ 10:00) (117/66 - 165/87)  RR: 28 (22 @ 10:40) (14 - 35)  SpO2: 96% (22 @ 10:40) (88% - 98%)  Wt(kg): --        CAPILLARY BLOOD GLUCOSE      POCT Blood Glucose.: 204 mg/dL (2022 11:37)    I&O's Summary    2022 07:01  -  2022 07:00  --------------------------------------------------------  IN: 500 mL / OUT: 2 mL / NET: 498 mL    2022 07:01  -  2022 11:58  --------------------------------------------------------  IN: 458 mL / OUT: 0 mL / NET: 458 mL        Physical Exam:     Gen: uncomfortable appearing, resting in bed - nad  Neuro: moving all extremities, AOx3  CV: RRR, no murmur  Pulm: clear lungs b/l  GI: soft, distended, mild tenderness diffusely but worst in mid-epigastrum  Ext: no edema/swelling  Skin: no petechiae/rashes        Meds:  enoxaparin Injectable 40 milliGRAM(s) SubCutaneous daily  ATENolol  Tablet 25 milliGRAM(s) Oral daily  dextrose 40% Gel 15 Gram(s) Oral once  dextrose 50% Injectable 25 Gram(s) IV Push once  dextrose 50% Injectable 12.5 Gram(s) IV Push once  dextrose 50% Injectable 25 Gram(s) IV Push once  dextrose 50% Injectable 50 milliLiter(s) IV Push every 15 minutes  dextrose 50% Injectable 25 milliLiter(s) IV Push every 15 minutes  fenofibrate Tablet 145 milliGRAM(s) Oral daily  glucagon  Injectable 1 milliGRAM(s) IntraMuscular once  insulin regular Infusion 4 Unit(s)/Hr IV Continuous <Continuous>  acetaminophen     Tablet .. 650 milliGRAM(s) Oral every 6 hours PRN  ALPRAZolam 0.5 milliGRAM(s) Oral at bedtime PRN  HYDROmorphone  Injectable 1 milliGRAM(s) IV Push every 4 hours PRN  melatonin 3 milliGRAM(s) Oral at bedtime PRN  morphine  - Injectable 2 milliGRAM(s) IV Push every 4 hours PRN  ondansetron Injectable 4 milliGRAM(s) IV Push every 8 hours PRN  aluminum hydroxide/magnesium hydroxide/simethicone Suspension 30 milliLiter(s) Oral every 4 hours PRN  pancrelipase  (CREON 12,000 Lipase Units) 4 Capsule(s) Oral three times a day with meals  dextrose 5% + lactated ringers. 1000 milliLiter(s) IV Continuous <Continuous>  dextrose 5%. 1000 milliLiter(s) IV Continuous <Continuous>  dextrose 5%. 1000 milliLiter(s) IV Continuous <Continuous>                                      12.9   17.45 )-----------( 294      ( 2022 10:10 )             38.4       02-    136  |  100  |  10  ----------------------------<  189<H>  4.0   |  24  |  0.63    Ca    8.8      2022 04:54    TPro  7.2  /  Alb  3.2<L>  /  TBili  0.7  /  DBili  x   /  AST  31  /  ALT  64  /  AlkPhos  77  02-24    Lactate 1.2            @ 02:04          PT/INR - ( 2022 04:54 )   PT: 13.4 sec;   INR: 1.14 ratio           Urinalysis Basic - ( 2022 17:58 )    Color: Yellow / Appearance: Slightly Turbid / S.025 / pH: x  Gluc: x / Ketone: Negative  / Bili: Negative / Urobili: Negative   Blood: x / Protein: 30 mg/dL / Nitrite: Negative   Leuk Esterase: Negative / RBC: 0-2 /HPF / WBC 0-2   Sq Epi: x / Non Sq Epi: Few / Bacteria: x            Radiology: acute interstitial edematous pancreatitis; hepatic steatosis.    CENTRAL LINE: N  KNOG: N  A-LINE: N    GLOBAL ISSUE/BEST PRACTICE:  Analgesia: morphine/dilaudid  Sedation: n/a  CAM-ICU: negative  HOB elevation: yes  Stress ulcer prophylaxis: protonix  VTE prophylaxis: lovenox  Glycemic control: insulin gtt  Nutrition: NPO    CODE STATUS: full code

## 2022-02-24 NOTE — PROGRESS NOTE ADULT - PROBLEM SELECTOR PLAN 1
-CT with Acute interstitial edematous pancreatitis likely 2/2 hypertriglyceridemia  patient reports multiple episodes in the past, all 2/2 hypertriglyceridemia  -[place] score on admission 3 (WBC>16, age >55, AST>250)- severe pancreatitis likely.   -triglycerides in am 2001 downtrending from 3327  -ICU for close monitoring and insulin drip   -IVF at 150cc/hr  -fenofibrate 145 mg oral daily  -hydromorphone for pain control as needed, monitor response to pain and adjust as necessary   -GI Dr. Marshall consulted, fu recs -CT with Acute interstitial edematous pancreatitis likely 2/2 hypertriglyceridemia  patient reports multiple episodes in the past, all 2/2 hypertriglyceridemia  -[place] score on admission 3 (WBC>16, age >55, AST>250)- severe pancreatitis likely.   -triglycerides in am 2001 downtrending from 3327  -ICU for close monitoring and insulin drip   -IVF at 150cc/hr  -fenofibrate 145 mg oral daily  -hydromorphone for pain control as needed, monitor response to pain and adjust as necessary. zofran PRN for nausea   -GI Dr. Marshall consulted, fu recs -CT with Acute interstitial edematous pancreatitis likely 2/2 hypertriglyceridemia  patient reports multiple episodes in the past, all 2/2 hypertriglyceridemia  -[place] score on admission 3 (WBC>16, age >55, AST>250)- severe pancreatitis likely.   -triglycerides in am 2001 downtrending from 3327  -ICU for close monitoring and insulin drip   -IVF at 150cc/hr, npo  -fenofibrate 145 mg oral daily  -hydromorphone for pain control as needed, monitor response to pain and adjust as necessary. zofran PRN for nausea   -GI Dr. Marshall consulted, fu recs

## 2022-02-24 NOTE — PROGRESS NOTE ADULT - ASSESSMENT
62 yo female with pmhx of chronic diverticulitis, type 2 DM, HTN, hypertriglyceridemia, chronic pancreatitis presents with complaint of diffuse abdominal pain admitted with acute pancreatitis.

## 2022-02-24 NOTE — DISCHARGE NOTE PROVIDER - NSDCMRMEDTOKEN_GEN_ALL_CORE_FT
atenolol 25 mg oral tablet: 1 tab(s) orally once a day  Creon 24,000 units oral delayed release capsule: 2 cap(s) orally 3 times a day  fenofibrate 160 mg oral tablet: 1 tab(s) orally once a day  indapamide 1.25 mg oral tablet: 1 tab(s) orally once a day (in the morning)  MetFORMIN (Eqv-Glumetza) 1000 mg oral tablet, extended release: 1 tab(s) orally 2 times a day  Xanax 0.5 mg oral tablet: 1 tab(s) orally once a day (at bedtime), As Needed   atenolol 25 mg oral tablet: 1 tab(s) orally once a day  Creon 24,000 units oral delayed release capsule: 2 cap(s) orally 3 times a day with meal and 1 cap with snack  fenofibrate 160 mg oral tablet: 1 tab(s) orally once a day  ferrous sulfate 325 mg (65 mg elemental iron) oral tablet: 1 tablet oral daily  Fish Oil 1000 mg oral capsule: 2 caps oral twice a day  Lipitor 40 mg oral tablet: 1 tab(s) orally once a day (at bedtime)  MetFORMIN (Eqv-Glumetza) 1000 mg oral tablet, extended release: 1 tab(s) orally 2 times a day  Vitamin B12 1000 mcg oral tablet: 1 tab(s) orally once a day  Vitamin D3 50 mcg (2000 intl units) oral tablet: 1 tab(s) orally once a day  Xanax 0.5 mg oral tablet: 1 tab(s) orally 2 times a day, As Needed  zinc (as acetate) 50 mg oral capsule: 1 cap oral daily

## 2022-02-24 NOTE — DISCHARGE NOTE PROVIDER - NSDCCPCAREPLAN_GEN_ALL_CORE_FT
PRINCIPAL DISCHARGE DIAGNOSIS  Diagnosis: Acute pancreatitis  Assessment and Plan of Treatment: You have a known history of pancreatitis. You came to the hospital with a repeat attack. You were given IV insulin drip and placed on bowel rest. You got better and your blood levels improved.   Follow up with your primary care doctor within 1 week of discharge.      SECONDARY DISCHARGE DIAGNOSES  Diagnosis: Type 2 diabetes mellitus  Assessment and Plan of Treatment: You were previously diagnosed with Diabetes. Your A1c on admission was 6.9%. Please continue on your home medications, diabetic diet and exercise regimen at this time and follow up with your primary care doctor for further surveillance and management of your Diabetes.    Diagnosis: HTN (hypertension)  Assessment and Plan of Treatment: You have a known history of hypertension, the medical term for high blood pressure. Untreated high blood pressure increases the strain on the heart and arteries, eventually causing organ damage. High blood pressure also increases the risk of heart failure, heart attack (myocardial infarction), stroke, and kidney failure.   - Continue to take your medications to prevent the possible complications of uncontrolled hypertension.   - Please also follow up with your primary care physician on a regular basis to make sure your blood pressure continues to be well controlled.    Diagnosis: Anxiety  Assessment and Plan of Treatment: You have known anxiety. Continue to take your medications and Follow up with your primary care doctor within 1 week of discharge.     PRINCIPAL DISCHARGE DIAGNOSIS  Diagnosis: Acute pancreatitis  Assessment and Plan of Treatment: You have a known history of pancreatitis. You came to the hospital with a repeat attack. You were given IV insulin drip and placed on bowel rest. You got better and your blood levels improved.   Follow up with your primary care doctor within 1 week of discharge.  Additionally, we are sending a prescription of lipitor to your [facility] pharmacy. Please pick it up on discharge and discuss beginning this medication with your primary care doctor.      SECONDARY DISCHARGE DIAGNOSES  Diagnosis: Anxiety  Assessment and Plan of Treatment: You have known anxiety. Continue to take your medications and Follow up with your primary care doctor within 1 week of discharge.    Diagnosis: HTN (hypertension)  Assessment and Plan of Treatment: You have a known history of hypertension, the medical term for high blood pressure. Untreated high blood pressure increases the strain on the heart and arteries, eventually causing organ damage. High blood pressure also increases the risk of heart failure, heart attack (myocardial infarction), stroke, and kidney failure.   - Continue to take your medications to prevent the possible complications of uncontrolled hypertension.   - Please also follow up with your primary care physician on a regular basis to make sure your blood pressure continues to be well controlled.    Diagnosis: Type 2 diabetes mellitus  Assessment and Plan of Treatment: You were previously diagnosed with Diabetes. Your A1c on admission was 6.9%. Please continue on your home medications, diabetic diet and exercise regimen at this time and follow up with your primary care doctor for further surveillance and management of your Diabetes.    Diagnosis: Gout  Assessment and Plan of Treatment: We suspected while inpatient that you have gout affecting your R toe, which will inpatient you received motrin for. Since we have discontinued your indapamide, this may help with the uric acid that contributes to gout. As such, your uric acid levels may return to a therapeutic range. Please follow-up with your primary care doctor for a workup of gout.  Please take motrin as needed to manage your suspected gout pains.

## 2022-02-24 NOTE — PATIENT PROFILE ADULT - FALL HARM RISK - HARM RISK INTERVENTIONS

## 2022-02-24 NOTE — CONSULT NOTE ADULT - ASSESSMENT
60 y/o F w/ pmh of diverticulitis, dm2, htn, hypertriglyceridemia, chronic pancreatitis, presented to [facility] for diffuse abdominal pain was found to have lipase of 6548, pt was made NPO started on IVF and IVAB and admitted to [facility], triglycerides came back this morning elevated to 3370 and MICU consulted for evaluation Pt seen and examined today reports feeling better but still having abd pain, pt otherwise denies any other medical complains. Pt reports prior hx of pancreatitis 2/2 to hypertriglyceridemia for which she had required an insulin gtt.      -Neuro: no acute issues, Pain control w/ IV dilaudid and morphine  -Cardiac: HDS no acute issue , maintain MAP >65, htn on home atenolol  -Resp: No acute issues, maintain o2 >90%  -GI: Pancreatitis 2/2 to hypertriglyceridemia will start pt on insulin gtt at 4U/hr, maintain NPO status, change IVF from LR----> D5LR at 150cc/hr,  62 y/o F w/ pmh of diverticulitis, dm2, htn, hypertriglyceridemia, chronic pancreatitis, presented to [facility] for diffuse abdominal pain was found to have lipase of 6548, pt was made NPO started on IVF and IVAB and admitted to [facility], triglycerides came back this morning elevated to 3370 and MICU consulted for evaluation Pt seen and examined today reports feeling better but still having abd pain, pt otherwise denies any other medical complains. Pt reports prior hx of pancreatitis 2/2 to hypertriglyceridemia for which she had required an insulin gtt.      -Neuro: no acute issues, Pain control w/ IV dilaudid and morphine  -Cardiac: HDS no acute issue , maintain MAP >65, htn on home atenolol  -Resp: No acute issues, maintain o2 >90%  -GI: Pancreatitis 2/2 to hypertriglyceridemia will start pt on insulin gtt at 4U/hr, maintain NPO status, change IVF from LR----> D5LR at 150cc/hr cont home fenofibrate and creon, maintain NPO status  -Renal: Renal function stable cont to monitor renal function and lytes closely  -Endo: DM2 will d/c ISS given pt has been started on insulin gtt for hypertriglyceridemia monitor FS q1h  -Heme: DVT ppx w/ lovenox  -Dispo: Transfer to MICU, pt is currently full code, current plan d/w pt at bedside and  via phone who I updated, case d/w eICU dr. huizar

## 2022-02-24 NOTE — CHART NOTE - NSCHARTNOTEFT_GEN_A_CORE
Triglyceride result 3327. DW ICU. Patient to be transferred to ICU at this time. ICU discussed with patient.

## 2022-02-24 NOTE — PROGRESS NOTE ADULT - SUBJECTIVE AND OBJECTIVE BOX
Patient is a 61y old  Female who presents with a chief complaint of abdominal pain (2022 12:16)      INTERVAL HPI/OVERNIGHT EVENTS: Patient seen and examined at bedside. Patient continues to have abdominal discomfort and nausea with one episode of vomiting this morning. Denies fevers, chills, chest pain, dyspnea, and dysuria.     MEDICATIONS  (STANDING):  ATENolol  Tablet 25 milliGRAM(s) Oral daily  dextrose 40% Gel 15 Gram(s) Oral once  dextrose 5% + lactated ringers. 1000 milliLiter(s) (150 mL/Hr) IV Continuous <Continuous>  dextrose 5%. 1000 milliLiter(s) (100 mL/Hr) IV Continuous <Continuous>  dextrose 5%. 1000 milliLiter(s) (50 mL/Hr) IV Continuous <Continuous>  dextrose 50% Injectable 25 Gram(s) IV Push once  dextrose 50% Injectable 12.5 Gram(s) IV Push once  dextrose 50% Injectable 25 Gram(s) IV Push once  dextrose 50% Injectable 50 milliLiter(s) IV Push every 15 minutes  dextrose 50% Injectable 25 milliLiter(s) IV Push every 15 minutes  enoxaparin Injectable 40 milliGRAM(s) SubCutaneous daily  fenofibrate Tablet 145 milliGRAM(s) Oral daily  glucagon  Injectable 1 milliGRAM(s) IntraMuscular once  insulin regular Infusion 4 Unit(s)/Hr (4 mL/Hr) IV Continuous <Continuous>  pancrelipase  (CREON 12,000 Lipase Units) 4 Capsule(s) Oral three times a day with meals    MEDICATIONS  (PRN):  acetaminophen     Tablet .. 650 milliGRAM(s) Oral every 6 hours PRN Temp greater or equal to 38C (100.4F), Mild Pain (1 - 3)  ALPRAZolam 0.5 milliGRAM(s) Oral at bedtime PRN anxiety  aluminum hydroxide/magnesium hydroxide/simethicone Suspension 30 milliLiter(s) Oral every 4 hours PRN Dyspepsia  HYDROmorphone  Injectable 1 milliGRAM(s) IV Push every 4 hours PRN Severe Pain (7 - 10)  melatonin 3 milliGRAM(s) Oral at bedtime PRN Insomnia  morphine  - Injectable 2 milliGRAM(s) IV Push every 4 hours PRN Moderate Pain (4 - 6)  ondansetron Injectable 4 milliGRAM(s) IV Push every 8 hours PRN Nausea and/or Vomiting      Allergies    No Known Allergies    Intolerances    codeine (Drowsiness)      REVIEW OF SYSTEMS:  CONSTITUTIONAL: Reports fatigue. Denies fever or chills.  HEENT:  No headache.  RESPIRATORY: No shortness of breath or cough.   CARDIOVASCULAR: No chest pain or palpitations.   GASTROINTESTINAL: Reports abdominal pain, nausea and one episode of vomiting.   GENITOURINARY: No dysuria.  MUSCULOSKELETAL: No joint pain or lower extremity swelling.   SKIN: Denies rashes and lesions.     Vital Signs Last 24 Hrs  T(C): 36.6 (2022 08:00), Max: 37.2 (2022 23:40)  T(F): 97.8 (2022 08:00), Max: 98.9 (2022 23:40)  HR: 116 (2022 11:00) (66 - 116)  BP: 130/80 (2022 11:00) (117/66 - 165/87)  BP(mean): 97 (2022 11:00) (85 - 102)  RR: 35 (2022 11:00) (14 - 35)  SpO2: 96% (2022 11:00) (88% - 98%)    PHYSICAL EXAM:  GENERAL: uncomfortable appearing, resting in bed, in no acute distress.   HEENT:  anicteric, moist mucous membranes  RESPIRATORY: Lungs clear to auscultation bilaterally, no wheezes, rales, or rhonchi.  CARDIAC: regular rate and rhythm, +s1, s2.  GASTROINTESTINAL:  abdomen mildly distended, soft, mild tenderness to light palpation, bowel sounds in all four quadrants.   EXTREMITIES: no lower extremity edema.  SKIN: no rashes.   NERVOUS SYSTEM: A&Ox3, answers questions and follows requests appropriately. moving all extremities with appropriate strength.    LABS:                        12.9   17.45 )-----------( 294      ( 2022 10:10 )             38.4     CBC Full  -  ( 2022 10:10 )  WBC Count : 17.45 K/uL  Hemoglobin : 12.9 g/dL  Hematocrit : 38.4 %  Platelet Count - Automated : 294 K/uL  Mean Cell Volume : 81.4 fl  Mean Cell Hemoglobin : 27.3 pg  Mean Cell Hemoglobin Concentration : 33.6 gm/dL  Auto Neutrophil # : 15.10 K/uL  Auto Lymphocyte # : 0.93 K/uL  Auto Monocyte # : 1.15 K/uL  Auto Eosinophil # : 0.09 K/uL  Auto Basophil # : 0.09 K/uL  Auto Neutrophil % : 86.6 %  Auto Lymphocyte % : 5.3 %  Auto Monocyte % : 6.6 %  Auto Eosinophil % : 0.5 %  Auto Basophil % : 0.5 %    2022 04:54    136    |  100    |  10     ----------------------------<  189    4.0     |  24     |  0.63     Ca    8.8        2022 04:54    TPro  7.2    /  Alb  3.2    /  TBili  0.7    /  DBili  x      /  AST  31     /  ALT  64     /  AlkPhos  77     2022 04:54    PT/INR - ( 2022 04:54 )   PT: 13.4 sec;   INR: 1.14 ratio           Urinalysis Basic - ( 2022 17:58 )    Color: Yellow / Appearance: Slightly Turbid / S.025 / pH: x  Gluc: x / Ketone: Negative  / Bili: Negative / Urobili: Negative   Blood: x / Protein: 30 mg/dL / Nitrite: Negative   Leuk Esterase: Negative / RBC: 0-2 /HPF / WBC 0-2   Sq Epi: x / Non Sq Epi: Few / Bacteria: x      CAPILLARY BLOOD GLUCOSE      POCT Blood Glucose.: 204 mg/dL (2022 11:37)  POCT Blood Glucose.: 202 mg/dL (2022 10:27)  POCT Blood Glucose.: 177 mg/dL (2022 06:34)  POCT Blood Glucose.: 190 mg/dL (2022 22:05)          RADIOLOGY & ADDITIONAL TESTS:    Personally reviewed.     Consultant(s) Notes Reviewed:  [x] YES  [ ] NO     Patient is a 61y old  Female who presents with a chief complaint of abdominal pain admitted for acute pancreatitis.  (2022 12:16)      INTERVAL HPI/OVERNIGHT EVENTS: Patient seen and examined at bedside. Patient continues to have abdominal discomfort and nausea with one episode of vomiting this morning. Denies fevers, chills, chest pain, dyspnea, and dysuria.     MEDICATIONS  (STANDING):  ATENolol  Tablet 25 milliGRAM(s) Oral daily  dextrose 40% Gel 15 Gram(s) Oral once  dextrose 5% + lactated ringers. 1000 milliLiter(s) (150 mL/Hr) IV Continuous <Continuous>  dextrose 5%. 1000 milliLiter(s) (100 mL/Hr) IV Continuous <Continuous>  dextrose 5%. 1000 milliLiter(s) (50 mL/Hr) IV Continuous <Continuous>  dextrose 50% Injectable 25 Gram(s) IV Push once  dextrose 50% Injectable 12.5 Gram(s) IV Push once  dextrose 50% Injectable 25 Gram(s) IV Push once  dextrose 50% Injectable 50 milliLiter(s) IV Push every 15 minutes  dextrose 50% Injectable 25 milliLiter(s) IV Push every 15 minutes  enoxaparin Injectable 40 milliGRAM(s) SubCutaneous daily  fenofibrate Tablet 145 milliGRAM(s) Oral daily  glucagon  Injectable 1 milliGRAM(s) IntraMuscular once  insulin regular Infusion 4 Unit(s)/Hr (4 mL/Hr) IV Continuous <Continuous>  pancrelipase  (CREON 12,000 Lipase Units) 4 Capsule(s) Oral three times a day with meals    MEDICATIONS  (PRN):  acetaminophen     Tablet .. 650 milliGRAM(s) Oral every 6 hours PRN Temp greater or equal to 38C (100.4F), Mild Pain (1 - 3)  ALPRAZolam 0.5 milliGRAM(s) Oral at bedtime PRN anxiety  aluminum hydroxide/magnesium hydroxide/simethicone Suspension 30 milliLiter(s) Oral every 4 hours PRN Dyspepsia  HYDROmorphone  Injectable 1 milliGRAM(s) IV Push every 4 hours PRN Severe Pain (7 - 10)  melatonin 3 milliGRAM(s) Oral at bedtime PRN Insomnia  morphine  - Injectable 2 milliGRAM(s) IV Push every 4 hours PRN Moderate Pain (4 - 6)  ondansetron Injectable 4 milliGRAM(s) IV Push every 8 hours PRN Nausea and/or Vomiting      Allergies    No Known Allergies    Intolerances    codeine (Drowsiness)      REVIEW OF SYSTEMS:  CONSTITUTIONAL: Reports fatigue. Denies fever or chills.  HEENT:  No headache.  RESPIRATORY: No shortness of breath or cough.   CARDIOVASCULAR: No chest pain or palpitations.   GASTROINTESTINAL: Reports abdominal pain, nausea and one episode of vomiting.   GENITOURINARY: No dysuria.  MUSCULOSKELETAL: No joint pain or lower extremity swelling.   SKIN: Denies rashes and lesions.     Vital Signs Last 24 Hrs  T(C): 36.6 (2022 08:00), Max: 37.2 (2022 23:40)  T(F): 97.8 (2022 08:00), Max: 98.9 (2022 23:40)  HR: 116 (2022 11:00) (66 - 116)  BP: 130/80 (2022 11:00) (117/66 - 165/87)  BP(mean): 97 (2022 11:00) (85 - 102)  RR: 35 (2022 11:00) (14 - 35)  SpO2: 96% (2022 11:00) (88% - 98%)    PHYSICAL EXAM:  GENERAL: uncomfortable appearing, resting in bed, in no acute distress.   HEENT:  anicteric, moist mucous membranes  RESPIRATORY: Lungs clear to auscultation bilaterally, no wheezes, rales, or rhonchi.  CARDIAC: regular rate and rhythm, +s1, s2.  GASTROINTESTINAL:  abdomen mildly distended, soft, mild tenderness to light palpation, bowel sounds in all four quadrants.   EXTREMITIES: no lower extremity edema.  SKIN: no rashes.   NERVOUS SYSTEM: A&Ox3, answers questions and follows requests appropriately. moving all extremities with appropriate strength.    LABS:                        12.9   17.45 )-----------( 294      ( 2022 10:10 )             38.4     CBC Full  -  ( 2022 10:10 )  WBC Count : 17.45 K/uL  Hemoglobin : 12.9 g/dL  Hematocrit : 38.4 %  Platelet Count - Automated : 294 K/uL  Mean Cell Volume : 81.4 fl  Mean Cell Hemoglobin : 27.3 pg  Mean Cell Hemoglobin Concentration : 33.6 gm/dL  Auto Neutrophil # : 15.10 K/uL  Auto Lymphocyte # : 0.93 K/uL  Auto Monocyte # : 1.15 K/uL  Auto Eosinophil # : 0.09 K/uL  Auto Basophil # : 0.09 K/uL  Auto Neutrophil % : 86.6 %  Auto Lymphocyte % : 5.3 %  Auto Monocyte % : 6.6 %  Auto Eosinophil % : 0.5 %  Auto Basophil % : 0.5 %    2022 04:54    136    |  100    |  10     ----------------------------<  189    4.0     |  24     |  0.63     Ca    8.8        2022 04:54    TPro  7.2    /  Alb  3.2    /  TBili  0.7    /  DBili  x      /  AST  31     /  ALT  64     /  AlkPhos  77     2022 04:54    PT/INR - ( 2022 04:54 )   PT: 13.4 sec;   INR: 1.14 ratio           Urinalysis Basic - ( 2022 17:58 )    Color: Yellow / Appearance: Slightly Turbid / S.025 / pH: x  Gluc: x / Ketone: Negative  / Bili: Negative / Urobili: Negative   Blood: x / Protein: 30 mg/dL / Nitrite: Negative   Leuk Esterase: Negative / RBC: 0-2 /HPF / WBC 0-2   Sq Epi: x / Non Sq Epi: Few / Bacteria: x      CAPILLARY BLOOD GLUCOSE      POCT Blood Glucose.: 204 mg/dL (2022 11:37)  POCT Blood Glucose.: 202 mg/dL (2022 10:27)  POCT Blood Glucose.: 177 mg/dL (2022 06:34)  POCT Blood Glucose.: 190 mg/dL (2022 22:05)          RADIOLOGY & ADDITIONAL TESTS:    Personally reviewed.     Consultant(s) Notes Reviewed:  [x] YES  [ ] NO

## 2022-02-24 NOTE — PROGRESS NOTE ADULT - ASSESSMENT
61F h/o diverticulitis, DM2, HTN, hypertriglyceridemia, chronic pancreatitis a/w acute pancreatitis 2/2 hypertriglyceridemia, transferred to ICU overnight for insulin gtt     Neuro: pain control with morphine/dilaudid  - continue home xanax; melatonin prn insomnia  CV: continue home atenolol  Pulm: no acute issues  GI: acute pancreatitis 2/2 hyperTGs - continue insulin gtt until TG < 1000  - NPO with aggressiv IV hydration, serial abd exams  - zofran/reglan as needed for nausea  - continue Creon  Renal: no acute issues; trend renal indices and UOP  ID: no indication for abx, observe off  Heme: dvt ppx with lovenox  Endo: continue fenofibrate

## 2022-02-24 NOTE — DISCHARGE NOTE PROVIDER - CARE PROVIDER_API CALL
Darrin Edwards)  Internal Medicine  [address] [place] [address]  Phone: [phone]  Fax: [phone]  Follow Up Time:    Darrin Edwards)  Internal Medicine  [address] [place] [address]  Phone: [phone]  Fax: [phone]  Established Patient  Follow Up Time:     Caio Harrison  Gastroenterology  [address] [place] [address]  Phone: [phone]  Fax: [phone]  Follow Up Time: 1 week

## 2022-02-24 NOTE — DISCHARGE NOTE PROVIDER - PROVIDER TOKENS
PROVIDER:[TOKEN:[[id number]]] PROVIDER:[TOKEN:[[id number]],ESTABLISHEDPATIENT:[T]],PROVIDER:[TOKEN:[[id number]],FOLLOWUP:[1 week]]

## 2022-02-24 NOTE — DIETITIAN INITIAL EVALUATION ADULT. - PROBLEM SELECTOR PLAN 1
patient with abdominal pain, nausea and vomiting   CT with Acute interstitial edematous pancreatitis likely 2/2 hypertriglyceridemia  patient reports multiple episodes in the past, all 2/2 hypertriglyceridemia  Vishnu score on admission 3 (WBC>16, age >55, AST>250)- severe pancreatitis likely.   Unable to calculate triglycerides given lipemia, sample sent to core lab for processing.   admit to telemetry pending triglyceride count. If elevated will likely need ICU for close monitoring and insulin drip   IVF at 150cc/hr  pain control as ordered, monitor response to pain and adjust as necessary   DW ICU, will evaluate patient when triglyceride levels result   DW core labs, triglycerides will be urgently processed once received, currently in transit  continue home creon   GI Dr. Marshall consulted, fu recs

## 2022-02-24 NOTE — PROGRESS NOTE ADULT - PROBLEM SELECTOR PLAN 5
-continue home atenolol with hold parameters  -home indapamide not on formulary, patient to have spouse bring in tomorrow -continue home atenolol with hold parameters  -may have to hold indapamide as a thiazide like med that could induce pancreatitis, monitor bp and conisder alternate med if needed

## 2022-02-24 NOTE — DISCHARGE NOTE PROVIDER - CARE PROVIDERS DIRECT ADDRESSES
,[email or web address]

## 2022-02-24 NOTE — CONSULT NOTE ADULT - SUBJECTIVE AND OBJECTIVE BOX
HPI: 62 y/o F w/ pmh of diverticulitis, dm2, htn, hypertriglyceridemia, chronic pancreatitis, [facility] was found to have lipase of 6548, pt was made NPO started on IVF and IVAB and admitted to [facility], triglycerides came back this morning elevated to 3370 and MICU consulted for evaluation Pt seen and examined today reports feeling better but still having abd pain, pt otherwise denies any other medical complains. Pt reports prior hx of pancreatitis 2/2 to hypertriglyceridemia for which she had required an insulin gtt.    Review of Systems:  Constitutional: No fever, chills, fatigue  Neuro: No headache, numbness, weakness  Resp: No cough, wheezing, shortness of breath  CVS: No chest pain, palpitations, leg swelling  GI: No abdominal pain, nausea, vomiting, diarrhea   : No dysuria, frequency, incontinence  Skin: No itching, burning, rashes, or lesions   Msk: No joint pain or swelling  Psych: No depression, anxiety, mood swings    T(F): 97.7 (22 @ 05:20), Max: 98.9 (22 @ 23:40)  HR: 89 (22 @ 05:20) (66 - 89)  BP: 153/94 (22 @ 05:20) (146/84 - 165/87)  RR: 18 (22 @ 05:20) (16 - 18)  SpO2: 95% (22 @ 05:20) (95% - 98%)  Wt(kg): --        CAPILLARY BLOOD GLUCOSE      POCT Blood Glucose.: 177 mg/dL (2022 06:34)      I&O's Summary      Physical Exam:   Gen: Comfortable in bed in NAD  Neuro: AAOx3  HEENT: PERRL  Resp: CTA b/l  CVS: +RRR  Abd: BSx4, soft, ND, +diffuse tenderness  Ext: no edema   Skin: warn/dry    Meds:    ATENolol  Tablet Oral    dextrose 40% Gel Oral  dextrose 50% Injectable IV Push  dextrose 50% Injectable IV Push  dextrose 50% Injectable IV Push  dextrose 50% Injectable IV Push  dextrose 50% Injectable IV Push  fenofibrate Tablet Oral  glucagon  Injectable IntraMuscular  insulin regular Infusion IV Continuous      acetaminophen     Tablet .. Oral PRN  ALPRAZolam Oral PRN  HYDROmorphone  Injectable IV Push PRN  melatonin Oral PRN  morphine  - Injectable IV Push PRN  ondansetron Injectable IV Push PRN      enoxaparin Injectable SubCutaneous    aluminum hydroxide/magnesium hydroxide/simethicone Suspension Oral PRN  pancrelipase  (CREON 12,000 Lipase Units) Oral      dextrose 5% + lactated ringers. IV Continuous  dextrose 5%. IV Continuous  dextrose 5%. IV Continuous                            14.5   19.72 )-----------( 354      ( 2022 17:45 )             41.3           136  |  100  |  10  ----------------------------<  189<H>  4.0   |  24  |  0.63    Ca    8.8      2022 04:54    TPro  7.2  /  Alb  3.2<L>  /  TBili  0.7  /  DBili  x   /  AST  31  /  ALT  64  /  AlkPhos  77      Lactate 1.2            @ 02:04          PT/INR - ( 2022 04:54 )   PT: 13.4 sec;   INR: 1.14 ratio           Urinalysis Basic - ( 2022 17:58 )    Color: Yellow / Appearance: Slightly Turbid / S.025 / pH: x  Gluc: x / Ketone: Negative  / Bili: Negative / Urobili: Negative   Blood: x / Protein: 30 mg/dL / Nitrite: Negative   Leuk Esterase: Negative / RBC: 0-2 /HPF / WBC 0-2   Sq Epi: x / Non Sq Epi: Few / Bacteria: x          Radiology:     < from: CT Abdomen and Pelvis w/ IV Cont (22 @ 18:34) >    ACC: [id number] EXAM:  CT ABDOMEN AND PELVIS IC                          PROCEDURE DATE:  2022          INTERPRETATION:  CLINICAL INFORMATION: diffuse abdominal pain nausea   diarrhea PCT    COMPARISON: 6.13.21.    CONTRAST/COMPLICATIONS:  IV Contrast: Omnipaque 350  90 cc administered   10 cc discarded  Oral Contrast: NONE  Complications: None reported at time of study completion    PROCEDURE:  CT of the Abdomen and Pelvis was performed.  Sagittal and coronal reformats were performed.    FINDINGS:    LOWER CHEST: Within normal limits.    LIVER: Hepatic steatosis.  BILE DUCTS: Normal caliber.  GALLBLADDER: Within normal limits.  SPLEEN: Within normal limits.  PANCREAS: Mild inflammation around the pancreas.  ADRENALS: Within normal limits.  KIDNEYS/URETERS: Within normal limits.    BLADDER: Within normal limits.  REPRODUCTIVE ORGANS: Within normal limits.    BOWEL: No bowel obstruction. The appendix is normal.  PERITONEUM: No ascites.  VESSELS:  Within normal limits.  RETROPERITONEUM/LYMPH NODES: No lymphadenopathy.  ABDOMINAL WALL: Within normal limits.  BONES: Within normal limits.    IMPRESSION: Acute interstitial edematous pancreatitis.    Hepatic steatosis.        --- End of Report ---            MARGARET GOODMAN MD; Attending Radiologist  This document has been electronically signed. 2022  7:00PM    < end of copied text >      CENTRAL LINE: N  KONG: N  A-LINE: N    GLOBAL ISSUE/BEST PRACTICE:  Analgesia: Y  Sedation: N  CAM-ICU: n/a  HOB elevation: Y  Stress ulcer prophylaxis: Y  VTE prophylaxis: Y  Glycemic control: Y  Nutrition: Y    CODE STATUS: FULL CODE    CRITICAL CARE TIME SPENT: 40 mins  (Assessing presenting problems of acute illness, which pose high probability of life threatening deterioration or end organ damage/dysfunction, as well as medical decision making including initiating plan of care, reviewing data, reviewing radiologic exams, discussing with multidisciplinary team,  discussing goals of care with patient/family, and writing this note.  Non-inclusive of procedures performed)

## 2022-02-24 NOTE — DIETITIAN INITIAL EVALUATION ADULT. - ADD RECOMMEND
1) Recommend introducing clear liquid diet; progress to full liquid to Consistent carbohydrate, low fat diet. 2)  Monitor PO intake, diet tolerance, weight trends, labs, GI function, and skin integrity.

## 2022-02-24 NOTE — DISCHARGE NOTE PROVIDER - HOSPITAL COURSE
FROM ADMISSION H+P:   HPI:  60 yo female with pmhx of chronic diverticulitis, type 2 DM, HTN, hypertriglyceridemia, chronic pancreatitis presents with complaint of diffuse abdominal pain. Patient states that for the last two weeks she has had abdominal pain, nausea and diarrhea. Today she began having worsening pain which she thought may be related to her diverticulitis Patient states she had ct scan of abdomen showing mild acute diverticulitis at [facility] on 2/17. She completed an Augmentin course one week ago. Pt c/o of chills.  no urinary symptoms no chest pain no sob. Pt states abdomen chronically bloated.    In the ed  WBC 19.72, , , lipase 6548. Unable to complete lab work due to lipemia, sample sent to [place] for processing   CT abdomen/pelvis: Acute interstitial edematous pancreatitis  Given zosyn x1, 1000cc NS x1, morphine 4 mg x 2.    (23 Feb 2022 22:18)      ---  HOSPITAL COURSE: Patient admitted for acute pancreatitis and admitted to ICU for insulin gtt. GI consulted, recommended NPO, IVF, creon. Patient triglycerides and lipase trended down. Transferred to [facility] when insulin gtt no longer needed.   Patient was medically optimized and improved clinically throughout hospital course. Patient seen and examined on day of discharge. Patient is medically stable for discharge to ______ with outpatient follow up.       updated 2/24    ---  CONSULTANTS:   GI - Dr Marshall    ---  TIME SPENT:  I, the attending physician, was physically present for the key portions of the evaluation and management (E/M) service provided. The total amount of time spent reviewing the hospital notes, laboratory values, imaging findings, assessing/counseling the patient, discussing with consultant physicians, social work, nursing staff was -- minutes    ---  Primary care provider was made aware of plan for discharge:      [  ] NO     [ x ] YES     FROM ADMISSION H+P:   HPI:  62 yo female with pmhx of chronic diverticulitis, type 2 DM, HTN, hypertriglyceridemia, chronic pancreatitis presents with complaint of diffuse abdominal pain. Patient states that for the last two weeks she has had abdominal pain, nausea and diarrhea. Today she began having worsening pain which she thought may be related to her diverticulitis Patient states she had ct scan of abdomen showing mild acute diverticulitis at [facility] on 2/17. She completed an Augmentin course one week ago. Pt c/o of chills.  no urinary symptoms no chest pain no sob. Pt states abdomen chronically bloated.    In the ed  WBC 19.72, , , lipase 6548. Unable to complete lab work due to lipemia, sample sent to [place] for processing   CT abdomen/pelvis: Acute interstitial edematous pancreatitis  Given zosyn x1, 1000cc NS x1, morphine 4 mg x 2.    (23 Feb 2022 22:18)      ---  HOSPITAL COURSE: Patient admitted for acute pancreatitis and admitted to ICU for insulin gtt. GI consulted, recommended NPO, IVF, creon. Patient triglycerides and lipase trended down. Transferred to [facility] when insulin gtt no longer needed. Pt restarted on PO medications and advanced to PO diet with toleration. Daily CBC, CMP, were ordered and electrolytes repleted PRN. Patient was medically optimized and improved clinically throughout hospital course. Patient seen and examined on day of discharge. Patient is medically stable for discharge to [facility] with outpatient follow up.     T(C): 36.5 (02-27-22 @ 05:32), Max: 36.9 (02-26-22 @ 14:00)  HR: 67 (02-27-22 @ 05:32) (58 - 81)  BP: 125/74 (02-27-22 @ 05:32) (121/67 - 189/87)  RR: 18 (02-27-22 @ 05:32) (18 - 80)  SpO2: 96% (02-27-22 @ 06:21) (90% - 96%)    GENERAL: patient appears well, no acute distress, appropriate, pleasant  EYES: sclera clear, no exudates  ENMT: oropharynx clear without erythema, no exudates, moist mucous membranes  NECK: supple, soft, no thyromegaly noted  LUNGS: good air entry bilaterally, clear to auscultation, symmetric breath sounds, no wheezing or rhonchi appreciated  HEART: soft S1/S2, regular rate and rhythm, no murmurs noted, no lower extremity edema  GASTROINTESTINAL: abdomen is soft, nontender, nondistended, normoactive bowel sounds, no palpable masses  INTEGUMENT: good skin turgor, warm skin, appears well perfused  MUSCULOSKELETAL: no clubbing or cyanosis, no obvious deformity  NEUROLOGIC: awake, alert, oriented x3, good muscle tone in 4 extremities, no obvious sensory deficits  PSYCHIATRIC: mood is good, affect is congruent, linear and logical thought process    ---  CONSULTANTS:   GI - Dr Marshall    ---  TIME SPENT:  I, the attending physician, was physically present for the key portions of the evaluation and management (E/M) service provided. The total amount of time spent reviewing the hospital notes, laboratory values, imaging findings, assessing/counseling the patient, discussing with consultant physicians, social work, nursing staff was -- minutes    ---  Primary care provider was made aware of plan for discharge:      [  ] NO     [ x ] YES

## 2022-02-24 NOTE — DIETITIAN INITIAL EVALUATION ADULT. - ORAL INTAKE PTA/DIET HISTORY
Pt reports poor intake x3-4 days in setting of nausea and abdominal pain. Endorses monitoring carbohydrate intake; attempts to watch fat intake, consumes cold cuts frequently.  Confirms NKFA and no difficulties chewing/swallowing.

## 2022-02-24 NOTE — CONSULT NOTE ADULT - SUBJECTIVE AND OBJECTIVE BOX
[facility]  Torin Solano PA-C  [address] [place] [address]  [phone]      Chief Complaint:  Patient is a 61y old  Female who presents with a chief complaint of abdominal pain (2022 11:57)      HPI:  62 yo female with pmhx of chronic diverticulitis, type 2 DM, HTN, hypertriglyceridemia, chronic pancreatitis presents with complaint of diffuse abdominal pain. Patient states that for the last two weeks she has had abdominal pain, nausea and diarrhea. Today she began having worsening pain which she thought may be related to her diverticulitis Patient states she had ct scan of abdomen showing mild acute diverticulitis at [facility] on . She completed an Augmentin course one week ago. Pt c/o of chills.  no urinary symptoms no chest pain no sob. Pt states abdomen chronically bloated.    In the ed  WBC 19.72, , , lipase 6548. Unable to complete lab work due to lipemia, sample sent to [facility] for processing   CT abdomen/pelvis: Acute interstitial edematous pancreatitis  Given zosyn x1, 1000cc NS x1, morphine 4 mg x 2.     GI was consulted for pancreatitis  Pt s/e in ICU, hx obtained as above  Pt now reports pain is improving. Still with nausea  Denies further GI complaints    Allergies:  codeine (Drowsiness)  No Known Allergies      Medications:  acetaminophen     Tablet .. 650 milliGRAM(s) Oral every 6 hours PRN  ALPRAZolam 0.5 milliGRAM(s) Oral at bedtime PRN  aluminum hydroxide/magnesium hydroxide/simethicone Suspension 30 milliLiter(s) Oral every 4 hours PRN  ATENolol  Tablet 25 milliGRAM(s) Oral daily  dextrose 40% Gel 15 Gram(s) Oral once  dextrose 5% + lactated ringers. 1000 milliLiter(s) IV Continuous <Continuous>  dextrose 5%. 1000 milliLiter(s) IV Continuous <Continuous>  dextrose 5%. 1000 milliLiter(s) IV Continuous <Continuous>  dextrose 50% Injectable 25 Gram(s) IV Push once  dextrose 50% Injectable 12.5 Gram(s) IV Push once  dextrose 50% Injectable 25 Gram(s) IV Push once  dextrose 50% Injectable 50 milliLiter(s) IV Push every 15 minutes  dextrose 50% Injectable 25 milliLiter(s) IV Push every 15 minutes  enoxaparin Injectable 40 milliGRAM(s) SubCutaneous daily  fenofibrate Tablet 145 milliGRAM(s) Oral daily  glucagon  Injectable 1 milliGRAM(s) IntraMuscular once  HYDROmorphone  Injectable 1 milliGRAM(s) IV Push every 4 hours PRN  insulin regular Infusion 4 Unit(s)/Hr IV Continuous <Continuous>  melatonin 3 milliGRAM(s) Oral at bedtime PRN  morphine  - Injectable 2 milliGRAM(s) IV Push every 4 hours PRN  ondansetron Injectable 4 milliGRAM(s) IV Push every 8 hours PRN  pancrelipase  (CREON 12,000 Lipase Units) 4 Capsule(s) Oral three times a day with meals      PMHX/PSHX:  Anxiety    HTN (hypertension)    Diabetes    Chronic pancreatitis, unspecified pancreatitis type    History of uterine fibroid        Family history:  Family history of breast cancer (Mother)    Family history of diabetes mellitus (DM) (Father)      ROS:  General:  No wt loss, fevers, chills, night sweats, fatigue,   Eyes:  Good vision, no reported pain  ENT:  No sore throat, pain, runny nose, dysphagia  CV:  No pain, palpitations, hypo/hypertension  Resp:  No dyspnea, cough, tachypnea, wheezing  GI:  +pain, +nausea, No vomiting, No diarrhea, No constipation, No weight loss, No fever, No pruritis, No rectal bleeding, No tarry stools, No dysphagia,  :  No pain, bleeding, incontinence, nocturia  Muscle:  No pain, weakness  Neuro:  No weakness, tingling, memory problems  Psych:  No fatigue, insomnia, mood problems, depression  Endocrine:  No polyuria, polydipsia, cold/heat intolerance  Heme:  No petechiae, ecchymosis, easy bruisability  Skin:  No rash, tattoos, scars, edema      PHYSICAL EXAM:   Vital Signs:  Vital Signs Last 24 Hrs  T(C): 36.6 (2022 08:00), Max: 37.2 (2022 23:40)  T(F): 97.8 (2022 08:00), Max: 98.9 (2022 23:40)  HR: 116 (2022 11:00) (66 - 116)  BP: 130/80 (2022 11:00) (117/66 - 165/87)  BP(mean): 97 (2022 11:00) (85 - 102)  RR: 35 (2022 11:00) (14 - 35)  SpO2: 96% (2022 11:00) (88% - 98%)  Daily Height in cm: 167.64 (2022 07:00)    Daily Weight in k.3 (2022 00:46)    GENERAL:  Appears stated age  ABDOMEN:  Soft, +tender to palpation worst in periumbilical region, +mildly distended  NEURO:  Alert    LABS:                        12.9   17.45 )-----------( 294      ( 2022 10:10 )             38.4     02-    136  |  100  |  10  ----------------------------<  189<H>  4.0   |  24  |  0.63    Ca    8.8      2022 04:54    TPro  7.2  /  Alb  3.2<L>  /  TBili  0.7  /  DBili  x   /  AST  31  /  ALT  64  /  AlkPhos  77  02-24    LIVER FUNCTIONS - ( 2022 04:54 )  Alb: 3.2 g/dL / Pro: 7.2 g/dL / ALK PHOS: 77 U/L / ALT: 64 U/L / AST: 31 U/L / GGT: x           PT/INR - ( 2022 04:54 )   PT: 13.4 sec;   INR: 1.14 ratio           Urinalysis Basic - ( 2022 17:58 )    Color: Yellow / Appearance: Slightly Turbid / S.025 / pH: x  Gluc: x / Ketone: Negative  / Bili: Negative / Urobili: Negative   Blood: x / Protein: 30 mg/dL / Nitrite: Negative   Leuk Esterase: Negative / RBC: 0-2 /HPF / WBC 0-2   Sq Epi: x / Non Sq Epi: Few / Bacteria: x      Amylase Serum--    Lipase [id number]          Imaging:  < from: CT Abdomen and Pelvis w/ IV Cont (22 @ 18:34) >    ACC: [id number] EXAM:  CT ABDOMEN AND PELVIS IC                          PROCEDURE DATE:  2022          INTERPRETATION:  CLINICAL INFORMATION: diffuse abdominal pain nausea   diarrhea PCT    COMPARISON: 6...    CONTRAST/COMPLICATIONS:  IV Contrast: Omnipaque 350  90 cc administered   10 cc discarded  Oral Contrast: NONE  Complications: None reported at time of study completion    PROCEDURE:  CT of the Abdomen and Pelvis was performed.  Sagittal and coronal reformats were performed.    FINDINGS:    LOWER CHEST: Within normal limits.    LIVER: Hepatic steatosis.  BILE DUCTS: Normal caliber.  GALLBLADDER: Within normal limits.  SPLEEN: Within normal limits.  PANCREAS: Mild inflammation around the pancreas.  ADRENALS: Within normal limits.  KIDNEYS/URETERS: Within normal limits.    BLADDER: Within normal limits.  REPRODUCTIVE ORGANS: Within normal limits.    BOWEL: No bowel obstruction. The appendix is normal.  PERITONEUM: No ascites.  VESSELS:  Within normal limits.  RETROPERITONEUM/LYMPH NODES: No lymphadenopathy.  ABDOMINAL WALL: Within normal limits.  BONES: Within normal limits.    IMPRESSION: Acute interstitial edematous pancreatitis.    Hepatic steatosis.    --- End of Report ---    MARGARET GOODMAN MD; Attending Radiologist  This document has been electronically signed. 2022  7:00PM    < end of copied text >

## 2022-02-24 NOTE — DIETITIAN INITIAL EVALUATION ADULT. - CHIEF COMPLAINT
61y Female with PMH of chronic pancreatitis, T2DM, HTN, Anxiety, Hypertriglyceridemia. Pt admitted on 2/23 complaining of abdominal pain, nausea and diarrhea x2 weeks. Pt presenting with acute on chronic pancreatitis and hypertriglyceridemia.

## 2022-02-24 NOTE — PROVIDER CONTACT NOTE (EICU) - BACKGROUND
61F with a history of DM, hypertriglyceridemia, chronic pancreatitis, HTN admitted for diffuse abd pain c/w acute pancreatitis, initial lipase was 6,548, now >2000.  Triglyceride 3370.  WBC 20

## 2022-02-25 LAB
ALBUMIN SERPL ELPH-MCNC: 2.9 G/DL — LOW (ref 3.3–5)
ALP SERPL-CCNC: 61 U/L — SIGNIFICANT CHANGE UP (ref 40–120)
ALT FLD-CCNC: 28 U/L — SIGNIFICANT CHANGE UP (ref 12–78)
ANION GAP SERPL CALC-SCNC: 6 MMOL/L — SIGNIFICANT CHANGE UP (ref 5–17)
AST SERPL-CCNC: 9 U/L — LOW (ref 15–37)
BASOPHILS # BLD AUTO: 0.06 K/UL — SIGNIFICANT CHANGE UP (ref 0–0.2)
BASOPHILS NFR BLD AUTO: 0.4 % — SIGNIFICANT CHANGE UP (ref 0–2)
BILIRUB SERPL-MCNC: 0.4 MG/DL — SIGNIFICANT CHANGE UP (ref 0.2–1.2)
BUN SERPL-MCNC: 8 MG/DL — SIGNIFICANT CHANGE UP (ref 7–23)
CALCIUM SERPL-MCNC: 9 MG/DL — SIGNIFICANT CHANGE UP (ref 8.5–10.1)
CHLORIDE SERPL-SCNC: 106 MMOL/L — SIGNIFICANT CHANGE UP (ref 96–108)
CO2 SERPL-SCNC: 28 MMOL/L — SIGNIFICANT CHANGE UP (ref 22–31)
CREAT SERPL-MCNC: 0.57 MG/DL — SIGNIFICANT CHANGE UP (ref 0.5–1.3)
EOSINOPHIL # BLD AUTO: 0.37 K/UL — SIGNIFICANT CHANGE UP (ref 0–0.5)
EOSINOPHIL NFR BLD AUTO: 2.4 % — SIGNIFICANT CHANGE UP (ref 0–6)
GLUCOSE SERPL-MCNC: 158 MG/DL — HIGH (ref 70–99)
HCT VFR BLD CALC: 33.6 % — LOW (ref 34.5–45)
HGB BLD-MCNC: 11.1 G/DL — LOW (ref 11.5–15.5)
IMM GRANULOCYTES NFR BLD AUTO: 0.4 % — SIGNIFICANT CHANGE UP (ref 0–1.5)
LYMPHOCYTES # BLD AUTO: 1.7 K/UL — SIGNIFICANT CHANGE UP (ref 1–3.3)
LYMPHOCYTES # BLD AUTO: 11.1 % — LOW (ref 13–44)
MAGNESIUM SERPL-MCNC: 2 MG/DL — SIGNIFICANT CHANGE UP (ref 1.6–2.6)
MCHC RBC-ENTMCNC: 27 PG — SIGNIFICANT CHANGE UP (ref 27–34)
MCHC RBC-ENTMCNC: 33 GM/DL — SIGNIFICANT CHANGE UP (ref 32–36)
MCV RBC AUTO: 81.8 FL — SIGNIFICANT CHANGE UP (ref 80–100)
MONOCYTES # BLD AUTO: 1.48 K/UL — HIGH (ref 0–0.9)
MONOCYTES NFR BLD AUTO: 9.7 % — SIGNIFICANT CHANGE UP (ref 2–14)
NEUTROPHILS # BLD AUTO: 11.64 K/UL — HIGH (ref 1.8–7.4)
NEUTROPHILS NFR BLD AUTO: 76 % — SIGNIFICANT CHANGE UP (ref 43–77)
NRBC # BLD: 0 /100 WBCS — SIGNIFICANT CHANGE UP (ref 0–0)
PHOSPHATE SERPL-MCNC: 2.8 MG/DL — SIGNIFICANT CHANGE UP (ref 2.5–4.5)
PLATELET # BLD AUTO: 257 K/UL — SIGNIFICANT CHANGE UP (ref 150–400)
POTASSIUM SERPL-MCNC: 3.7 MMOL/L — SIGNIFICANT CHANGE UP (ref 3.5–5.3)
POTASSIUM SERPL-SCNC: 3.7 MMOL/L — SIGNIFICANT CHANGE UP (ref 3.5–5.3)
PROT SERPL-MCNC: 6.3 G/DL — SIGNIFICANT CHANGE UP (ref 6–8.3)
RBC # BLD: 4.11 M/UL — SIGNIFICANT CHANGE UP (ref 3.8–5.2)
RBC # FLD: 15.6 % — HIGH (ref 10.3–14.5)
SODIUM SERPL-SCNC: 140 MMOL/L — SIGNIFICANT CHANGE UP (ref 135–145)
TRIGL SERPL-MCNC: 721 MG/DL — HIGH
WBC # BLD: 15.31 K/UL — HIGH (ref 3.8–10.5)
WBC # FLD AUTO: 15.31 K/UL — HIGH (ref 3.8–10.5)

## 2022-02-25 PROCEDURE — 99291 CRITICAL CARE FIRST HOUR: CPT

## 2022-02-25 PROCEDURE — 99233 SBSQ HOSP IP/OBS HIGH 50: CPT | Mod: GC

## 2022-02-25 RX ORDER — POTASSIUM CHLORIDE 20 MEQ
40 PACKET (EA) ORAL ONCE
Refills: 0 | Status: COMPLETED | OUTPATIENT
Start: 2022-02-25 | End: 2022-02-25

## 2022-02-25 RX ORDER — INSULIN HUMAN 100 [IU]/ML
INJECTION, SOLUTION SUBCUTANEOUS EVERY 6 HOURS
Refills: 0 | Status: DISCONTINUED | OUTPATIENT
Start: 2022-02-25 | End: 2022-02-26

## 2022-02-25 RX ORDER — DEXTROSE MONOHYDRATE, SODIUM CHLORIDE, AND POTASSIUM CHLORIDE 50; .745; 4.5 G/1000ML; G/1000ML; G/1000ML
1000 INJECTION, SOLUTION INTRAVENOUS
Refills: 0 | Status: DISCONTINUED | OUTPATIENT
Start: 2022-02-25 | End: 2022-02-26

## 2022-02-25 RX ORDER — LIPASE/PROTEASE/AMYLASE 16-48-48K
4 CAPSULE,DELAYED RELEASE (ENTERIC COATED) ORAL
Refills: 0 | Status: DISCONTINUED | OUTPATIENT
Start: 2022-02-25 | End: 2022-02-27

## 2022-02-25 RX ORDER — INSULIN LISPRO 100/ML
VIAL (ML) SUBCUTANEOUS AT BEDTIME
Refills: 0 | Status: DISCONTINUED | OUTPATIENT
Start: 2022-02-25 | End: 2022-02-26

## 2022-02-25 RX ADMIN — Medication 650 MILLIGRAM(S): at 13:21

## 2022-02-25 RX ADMIN — INSULIN HUMAN 4 UNIT(S)/HR: 100 INJECTION, SOLUTION SUBCUTANEOUS at 11:17

## 2022-02-25 RX ADMIN — Medication 650 MILLIGRAM(S): at 14:30

## 2022-02-25 RX ADMIN — Medication 145 MILLIGRAM(S): at 13:33

## 2022-02-25 RX ADMIN — SODIUM CHLORIDE 150 MILLILITER(S): 9 INJECTION, SOLUTION INTRAVENOUS at 06:42

## 2022-02-25 RX ADMIN — Medication 4 CAPSULE(S): at 21:06

## 2022-02-25 RX ADMIN — Medication 0.5 MILLIGRAM(S): at 06:59

## 2022-02-25 RX ADMIN — ENOXAPARIN SODIUM 40 MILLIGRAM(S): 100 INJECTION SUBCUTANEOUS at 13:33

## 2022-02-25 RX ADMIN — Medication 4 CAPSULE(S): at 08:47

## 2022-02-25 RX ADMIN — Medication 40 MILLIEQUIVALENT(S): at 06:41

## 2022-02-25 RX ADMIN — SODIUM CHLORIDE 150 MILLILITER(S): 9 INJECTION, SOLUTION INTRAVENOUS at 00:35

## 2022-02-25 RX ADMIN — ATENOLOL 25 MILLIGRAM(S): 25 TABLET ORAL at 06:42

## 2022-02-25 RX ADMIN — INSULIN HUMAN 1: 100 INJECTION, SOLUTION SUBCUTANEOUS at 17:58

## 2022-02-25 RX ADMIN — Medication 40 MILLIEQUIVALENT(S): at 02:10

## 2022-02-25 RX ADMIN — HYDROMORPHONE HYDROCHLORIDE 1 MILLIGRAM(S): 2 INJECTION INTRAMUSCULAR; INTRAVENOUS; SUBCUTANEOUS at 20:18

## 2022-02-25 RX ADMIN — Medication 0.5 MILLIGRAM(S): at 23:04

## 2022-02-25 RX ADMIN — Medication 4 CAPSULE(S): at 15:16

## 2022-02-25 RX ADMIN — HYDROMORPHONE HYDROCHLORIDE 1 MILLIGRAM(S): 2 INJECTION INTRAMUSCULAR; INTRAVENOUS; SUBCUTANEOUS at 19:48

## 2022-02-25 RX ADMIN — DEXTROSE MONOHYDRATE, SODIUM CHLORIDE, AND POTASSIUM CHLORIDE 150 MILLILITER(S): 50; .745; 4.5 INJECTION, SOLUTION INTRAVENOUS at 11:17

## 2022-02-25 NOTE — PROGRESS NOTE ADULT - ASSESSMENT
Abdominal pain  Pancreatitis  Elevated triglycerides  Recent diverticulitis    CT noted, results d/w patient  No reported diverticulitis on CT  Reports prior hx of pancreatitis 2/2 hypertriglyceridemia  Triglycerides currently trending down  Insulin gtt  Monitor electrolytes closely, replete as needed  NPO  IVF  Creon, fenofibrate  Will follow    I reviewed the overnight course of events on the unit, re-confirming the patient history. I discussed the care with the patient and their family  Differential diagnosis and plan of care discussed with patient after the evaluation  35 minutes spent on total encounter of which more than fifty percent of the encounter was spent counseling and/or coordinating care by the attending physician.  Advanced care planning was discussed with patient and family.  Advanced care planning forms were reviewed and discussed.  Risks, benefits and alternatives of gastroenterologic procedures were discussed in detail and all questions were answered.

## 2022-02-25 NOTE — PROGRESS NOTE ADULT - ASSESSMENT
60 y/o F w/ pmh of diverticulitis, dm2, htn, hypertriglyceridemia, chronic pancreatitis, [facility] was found to have lipase of 6548, pt was made NPO started on IVF and IVAB and admitted to tele, triglycerides came back elevated to 3370 and pt was transfer to ICU for insulin gtt.      -Neuro: no acute issues, Pain control w/ IV dilaudid and morphine  -Cardiac: HDS no acute issue , maintain MAP >65, htn on home atenolol  -Resp: No acute issues, maintain o2 >90%  -GI: Pancreatitis 2/2 to hypertriglyceridemia on insulin gtt at 4U/hr (will hold for now until K is corrected), maintain NPO status, cont D5LR at 150cc/hr cont home fenofibrate and creon, maintain NPO status  -Renal: Renal function stable, Repleated overnight with  KCL, amg and kphos; repeat lytes wnl, continue insulin gtt  -ID: no acute infectious process   -Endo: DM2 will d/c ISS given pt has been started on insulin gtt for hypertriglyceridemia monitor FS q1h  -Heme: DVT ppx w/ lovenox  -Dispo: Remains in MICU 2/2 to q1h FS while on insulin gtt, pt is full code, dispo pending hospital course 60 y/o F w/ pmh of diverticulitis, dm2, htn, hypertriglyceridemia, chronic pancreatitis, presented to [facility] for diffuse abdominal pain was found to have lipase of 6548, pt was made NPO started on IVF and IVAB and admitted to tele, triglycerides came back elevated to 3370 and pt was transfer to ICU for insulin gtt.      -Neuro: no acute issues, Pain control w/ IV dilaudid and morphine  -Cardiac: HDS no acute issue , maintain MAP >65, htn on home atenolol  -Resp: No acute issues, maintain o2 >90%  -GI: Pancreatitis 2/2 to hypertriglyceridemia on insulin gtt at 4U/hr, maintain NPO status, cont D5LR at 150cc/hr cont home fenofibrate and creon. Triglycerides 721; Patient able to be transitioned to floors  -Renal: Renal function stable, Repleated overnight with  KCL, amg and kphos; repeat lytes wnl, continue insulin gtt  -ID: no acute infectious process   -Endo: DM2 will d/c ISS given pt has been started on insulin gtt for hypertriglyceridemia monitor FS q1h  -Heme: DVT ppx w/ lovenox  -Dispo: May be transitioned to floors; triglycerides <1000

## 2022-02-25 NOTE — PROGRESS NOTE ADULT - PROBLEM SELECTOR PLAN 1
-CT with Acute interstitial edematous pancreatitis likely 2/2 hypertriglyceridemia  patient reports multiple episodes in the past, all 2/2 hypertriglyceridemia  -[place] score on admission 3 (WBC>16, age >55, AST>250)- severe pancreatitis likely.   -triglycerides in am 3327->2001->721  -ICU for close monitoring and insulin drip   -Replete electrolytes PRN  -IVF at 150cc/hr, npo  -fenofibrate 145 mg oral daily  -hydromorphone for pain control as needed, monitor response to pain and adjust as necessary. zofran PRN for nausea   -GI Dr. Marshall consulted, fu recs

## 2022-02-25 NOTE — PROGRESS NOTE ADULT - SUBJECTIVE AND OBJECTIVE BOX
[facility]  Torin Solano PA-C  [facility] [place] [address]  [phone]      INTERVAL HPI/OVERNIGHT EVENTS:  Pt s/e in ICU  Overnight events noted, electrolytes were repleted  Pt reports improving epigastric pain  Denies further GI complaints  Triglycerides noted    MEDICATIONS  (STANDING):  ATENolol  Tablet 25 milliGRAM(s) Oral daily  dextrose 40% Gel 15 Gram(s) Oral once  dextrose 5% + lactated ringers with potassium chloride 20 mEq/L 1000 milliLiter(s) (150 mL/Hr) IV Continuous <Continuous>  dextrose 5%. 1000 milliLiter(s) (100 mL/Hr) IV Continuous <Continuous>  dextrose 5%. 1000 milliLiter(s) (50 mL/Hr) IV Continuous <Continuous>  dextrose 50% Injectable 25 Gram(s) IV Push once  dextrose 50% Injectable 12.5 Gram(s) IV Push once  dextrose 50% Injectable 25 Gram(s) IV Push once  dextrose 50% Injectable 50 milliLiter(s) IV Push every 15 minutes  dextrose 50% Injectable 25 milliLiter(s) IV Push every 15 minutes  enoxaparin Injectable 40 milliGRAM(s) SubCutaneous daily  fenofibrate Tablet 145 milliGRAM(s) Oral daily  glucagon  Injectable 1 milliGRAM(s) IntraMuscular once  insulin regular Infusion 4 Unit(s)/Hr (4 mL/Hr) IV Continuous <Continuous>    MEDICATIONS  (PRN):  acetaminophen     Tablet .. 650 milliGRAM(s) Oral every 6 hours PRN Temp greater or equal to 38C (100.4F), Mild Pain (1 - 3)  ALPRAZolam 0.5 milliGRAM(s) Oral at bedtime PRN anxiety  aluminum hydroxide/magnesium hydroxide/simethicone Suspension 30 milliLiter(s) Oral every 4 hours PRN Dyspepsia  HYDROmorphone  Injectable 1 milliGRAM(s) IV Push every 4 hours PRN Severe Pain (7 - 10)  melatonin 3 milliGRAM(s) Oral at bedtime PRN Insomnia  morphine  - Injectable 2 milliGRAM(s) IV Push every 4 hours PRN Moderate Pain (4 - 6)  ondansetron Injectable 4 milliGRAM(s) IV Push every 8 hours PRN Nausea and/or Vomiting      Allergies    No Known Allergies    Intolerances    codeine (Drowsiness)    PHYSICAL EXAM:   Vital Signs:  Vital Signs Last 24 Hrs  T(C): 37.4 (2022 03:36), Max: 38.2 (2022 19:11)  T(F): 99.4 (2022 03:36), Max: 100.7 (2022 19:11)  HR: 78 (2022 09:00) (78 - 102)  BP: 113/59 (2022 09:00) (102/58 - 137/71)  BP(mean): 80 (2022 09:00) (76 - 98)  RR: 27 (2022 09:00) (19 - 46)  SpO2: 93% (2022 09:00) (90% - 97%)  Daily     Daily     GENERAL:  Appears stated age  ABDOMEN:  Soft, +TTP to deep palpation of epigastric region, non-distended  NEURO:  Alert      LABS:                        11.1   15.31 )-----------( 257      ( 2022 05:47 )             33.6     02-25    140  |  106  |  8   ----------------------------<  158<H>  3.7   |  28  |  0.57    Ca    9.0      2022 05:47  Phos  2.8     02-25  Mg     2.0     -25    TPro  6.3  /  Alb  2.9<L>  /  TBili  0.4  /  DBili  x   /  AST  9<L>  /  ALT  28  /  AlkPhos  61  02-25    PT/INR - ( 2022 04:54 )   PT: 13.4 sec;   INR: 1.14 ratio           Urinalysis Basic - ( 2022 17:58 )    Color: Yellow / Appearance: Slightly Turbid / S.025 / pH: x  Gluc: x / Ketone: Negative  / Bili: Negative / Urobili: Negative   Blood: x / Protein: 30 mg/dL / Nitrite: Negative   Leuk Esterase: Negative / RBC: 0-2 /HPF / WBC 0-2   Sq Epi: x / Non Sq Epi: Few / Bacteria: x

## 2022-02-25 NOTE — PROGRESS NOTE ADULT - SUBJECTIVE AND OBJECTIVE BOX
Patient is a 61y old  Female who presents with a chief complaint of abdominal pain (2022 08:32)      INTERVAL HPI/OVERNIGHT EVENTS: Pt seen and examined at bedside this AM. Pt reports she has a headache and mild mid-epigastric tenderness, which has been improving. Denies n/v/d/c, fevers, chills, CP, palpitations, fatigue.    MEDICATIONS  (STANDING):  ATENolol  Tablet 25 milliGRAM(s) Oral daily  dextrose 40% Gel 15 Gram(s) Oral once  dextrose 5% + lactated ringers with potassium chloride 20 mEq/L 1000 milliLiter(s) (150 mL/Hr) IV Continuous <Continuous>  dextrose 5%. 1000 milliLiter(s) (100 mL/Hr) IV Continuous <Continuous>  dextrose 5%. 1000 milliLiter(s) (50 mL/Hr) IV Continuous <Continuous>  dextrose 50% Injectable 25 Gram(s) IV Push once  dextrose 50% Injectable 12.5 Gram(s) IV Push once  dextrose 50% Injectable 25 Gram(s) IV Push once  dextrose 50% Injectable 50 milliLiter(s) IV Push every 15 minutes  dextrose 50% Injectable 25 milliLiter(s) IV Push every 15 minutes  enoxaparin Injectable 40 milliGRAM(s) SubCutaneous daily  fenofibrate Tablet 145 milliGRAM(s) Oral daily  glucagon  Injectable 1 milliGRAM(s) IntraMuscular once  insulin regular Infusion 4 Unit(s)/Hr (4 mL/Hr) IV Continuous <Continuous>    MEDICATIONS  (PRN):  acetaminophen     Tablet .. 650 milliGRAM(s) Oral every 6 hours PRN Temp greater or equal to 38C (100.4F), Mild Pain (1 - 3)  ALPRAZolam 0.5 milliGRAM(s) Oral at bedtime PRN anxiety  aluminum hydroxide/magnesium hydroxide/simethicone Suspension 30 milliLiter(s) Oral every 4 hours PRN Dyspepsia  HYDROmorphone  Injectable 1 milliGRAM(s) IV Push every 4 hours PRN Severe Pain (7 - 10)  melatonin 3 milliGRAM(s) Oral at bedtime PRN Insomnia  morphine  - Injectable 2 milliGRAM(s) IV Push every 4 hours PRN Moderate Pain (4 - 6)  ondansetron Injectable 4 milliGRAM(s) IV Push every 8 hours PRN Nausea and/or Vomiting      Allergies    No Known Allergies    Intolerances    codeine (Drowsiness)    Vital Signs Last 24 Hrs  T(C): 37.4 (2022 03:36), Max: 38.2 (2022 19:11)  T(F): 99.4 (2022 03:36), Max: 100.7 (2022 19:11)  HR: 78 (2022 09:00) (78 - 102)  BP: 113/59 (2022 09:00) (102/58 - 137/71)  BP(mean): 80 (2022 09:00) (76 - 98)  RR: 27 (2022 09:00) (19 - 46)  SpO2: 93% (2022 09:00) (90% - 97%)    PHYSICAL EXAM:  GENERAL: NAD, sleeping comfortably in bed.  HEENT:  anicteric, moist mucous membranes  CHEST/LUNG:  CTA b/l, no rales, wheezes, or rhonchi  HEART:  RRR, S1, S2  ABDOMEN:  BS+, soft, midly mid-epigastric tenderness to palpation, nondistended  EXTREMITIES: no edema, cyanosis, or calf tenderness  NERVOUS SYSTEM: answers questions and follows commands appropriately    LABS:                        11.1   15.31 )-----------( 257      ( 2022 05:47 )             33.6     CBC Full  -  ( 2022 05:47 )  WBC Count : 15.31 K/uL  Hemoglobin : 11.1 g/dL  Hematocrit : 33.6 %  Platelet Count - Automated : 257 K/uL  Mean Cell Volume : 81.8 fl  Mean Cell Hemoglobin : 27.0 pg  Mean Cell Hemoglobin Concentration : 33.0 gm/dL  Auto Neutrophil # : 11.64 K/uL  Auto Lymphocyte # : 1.70 K/uL  Auto Monocyte # : 1.48 K/uL  Auto Eosinophil # : 0.37 K/uL  Auto Basophil # : 0.06 K/uL  Auto Neutrophil % : 76.0 %  Auto Lymphocyte % : 11.1 %  Auto Monocyte % : 9.7 %  Auto Eosinophil % : 2.4 %  Auto Basophil % : 0.4 %    2022 05:47    140    |  106    |  8      ----------------------------<  158    3.7     |  28     |  0.57     Ca    9.0        2022 05:47  Phos  2.8       2022 05:47  Mg     2.0       2022 05:47    TPro  6.3    /  Alb  2.9    /  TBili  0.4    /  DBili  x      /  AST  9      /  ALT  28     /  AlkPhos  61     2022 05:47    PT/INR - ( 2022 04:54 )   PT: 13.4 sec;   INR: 1.14 ratio           Urinalysis Basic - ( 2022 17:58 )    Color: Yellow / Appearance: Slightly Turbid / S.025 / pH: x  Gluc: x / Ketone: Negative  / Bili: Negative / Urobili: Negative   Blood: x / Protein: 30 mg/dL / Nitrite: Negative   Leuk Esterase: Negative / RBC: 0-2 /HPF / WBC 0-2   Sq Epi: x / Non Sq Epi: Few / Bacteria: x      CAPILLARY BLOOD GLUCOSE      POCT Blood Glucose.: 123 mg/dL (2022 11:36)  POCT Blood Glucose.: 130 mg/dL (2022 10:25)  POCT Blood Glucose.: 131 mg/dL (2022 09:40)  POCT Blood Glucose.: 110 mg/dL (2022 08:45)  POCT Blood Glucose.: 128 mg/dL (2022 07:35)  POCT Blood Glucose.: 153 mg/dL (2022 06:35)  POCT Blood Glucose.: 149 mg/dL (2022 05:11)  POCT Blood Glucose.: 152 mg/dL (2022 02:06)  POCT Blood Glucose.: 152 mg/dL (2022 00:33)  POCT Blood Glucose.: 119 mg/dL (2022 23:27)  POCT Blood Glucose.: 146 mg/dL (2022 21:39)  POCT Blood Glucose.: 147 mg/dL (2022 20:37)  POCT Blood Glucose.: 133 mg/dL (2022 19:36)  POCT Blood Glucose.: 137 mg/dL (2022 18:36)  POCT Blood Glucose.: 136 mg/dL (2022 17:33)  POCT Blood Glucose.: 127 mg/dL (2022 16:44)  POCT Blood Glucose.: 128 mg/dL (2022 15:39)  POCT Blood Glucose.: 143 mg/dL (2022 14:24)  POCT Blood Glucose.: 163 mg/dL (2022 13:41)  POCT Blood Glucose.: 185 mg/dL (2022 12:32)          RADIOLOGY & ADDITIONAL TESTS:    Personally reviewed.     Consultant(s) Notes Reviewed:  [x] YES  [ ] NO     Patient is a 61y old  Female who presents with a chief complaint of abdominal pain (2022 08:32)      INTERVAL HPI/OVERNIGHT EVENTS: Pt seen and examined at bedside this AM. Pt reports she has a headache and mild mid-epigastric tenderness, which has been improving. Denies n/v/d/c, fevers, chills, CP, palpitations, fatigue.    Overnight events: pt was hypokalemic, insulin gtt stopped and repleted    MEDICATIONS  (STANDING):  ATENolol  Tablet 25 milliGRAM(s) Oral daily  dextrose 40% Gel 15 Gram(s) Oral once  dextrose 5% + lactated ringers with potassium chloride 20 mEq/L 1000 milliLiter(s) (150 mL/Hr) IV Continuous <Continuous>  dextrose 5%. 1000 milliLiter(s) (100 mL/Hr) IV Continuous <Continuous>  dextrose 5%. 1000 milliLiter(s) (50 mL/Hr) IV Continuous <Continuous>  dextrose 50% Injectable 25 Gram(s) IV Push once  dextrose 50% Injectable 12.5 Gram(s) IV Push once  dextrose 50% Injectable 25 Gram(s) IV Push once  dextrose 50% Injectable 50 milliLiter(s) IV Push every 15 minutes  dextrose 50% Injectable 25 milliLiter(s) IV Push every 15 minutes  enoxaparin Injectable 40 milliGRAM(s) SubCutaneous daily  fenofibrate Tablet 145 milliGRAM(s) Oral daily  glucagon  Injectable 1 milliGRAM(s) IntraMuscular once  insulin regular Infusion 4 Unit(s)/Hr (4 mL/Hr) IV Continuous <Continuous>    MEDICATIONS  (PRN):  acetaminophen     Tablet .. 650 milliGRAM(s) Oral every 6 hours PRN Temp greater or equal to 38C (100.4F), Mild Pain (1 - 3)  ALPRAZolam 0.5 milliGRAM(s) Oral at bedtime PRN anxiety  aluminum hydroxide/magnesium hydroxide/simethicone Suspension 30 milliLiter(s) Oral every 4 hours PRN Dyspepsia  HYDROmorphone  Injectable 1 milliGRAM(s) IV Push every 4 hours PRN Severe Pain (7 - 10)  melatonin 3 milliGRAM(s) Oral at bedtime PRN Insomnia  morphine  - Injectable 2 milliGRAM(s) IV Push every 4 hours PRN Moderate Pain (4 - 6)  ondansetron Injectable 4 milliGRAM(s) IV Push every 8 hours PRN Nausea and/or Vomiting      Allergies    No Known Allergies    Intolerances    codeine (Drowsiness)    Vital Signs Last 24 Hrs  T(C): 37.4 (2022 03:36), Max: 38.2 (2022 19:11)  T(F): 99.4 (2022 03:36), Max: 100.7 (2022 19:11)  HR: 78 (2022 09:00) (78 - 102)  BP: 113/59 (2022 09:00) (102/58 - 137/71)  BP(mean): 80 (2022 09:00) (76 - 98)  RR: 27 (2022 09:00) (19 - 46)  SpO2: 93% (2022 09:00) (90% - 97%)    PHYSICAL EXAM:  GENERAL: NAD, sleeping comfortably in bed.  HEENT:  anicteric, moist mucous membranes  CHEST/LUNG:  CTA b/l, no rales, wheezes, or rhonchi  HEART:  RRR, S1, S2  ABDOMEN:  BS+, soft, midly mid-epigastric tenderness to palpation, nondistended  EXTREMITIES: no edema, cyanosis, or calf tenderness  NERVOUS SYSTEM: answers questions and follows commands appropriately    LABS:                        11.1   15.31 )-----------( 257      ( 2022 05:47 )             33.6     CBC Full  -  ( 2022 05:47 )  WBC Count : 15.31 K/uL  Hemoglobin : 11.1 g/dL  Hematocrit : 33.6 %  Platelet Count - Automated : 257 K/uL  Mean Cell Volume : 81.8 fl  Mean Cell Hemoglobin : 27.0 pg  Mean Cell Hemoglobin Concentration : 33.0 gm/dL  Auto Neutrophil # : 11.64 K/uL  Auto Lymphocyte # : 1.70 K/uL  Auto Monocyte # : 1.48 K/uL  Auto Eosinophil # : 0.37 K/uL  Auto Basophil # : 0.06 K/uL  Auto Neutrophil % : 76.0 %  Auto Lymphocyte % : 11.1 %  Auto Monocyte % : 9.7 %  Auto Eosinophil % : 2.4 %  Auto Basophil % : 0.4 %    2022 05:47    140    |  106    |  8      ----------------------------<  158    3.7     |  28     |  0.57     Ca    9.0        2022 05:47  Phos  2.8       2022 05:47  Mg     2.0       2022 05:47    TPro  6.3    /  Alb  2.9    /  TBili  0.4    /  DBili  x      /  AST  9      /  ALT  28     /  AlkPhos  61     2022 05:47    PT/INR - ( 2022 04:54 )   PT: 13.4 sec;   INR: 1.14 ratio           Urinalysis Basic - ( 2022 17:58 )    Color: Yellow / Appearance: Slightly Turbid / S.025 / pH: x  Gluc: x / Ketone: Negative  / Bili: Negative / Urobili: Negative   Blood: x / Protein: 30 mg/dL / Nitrite: Negative   Leuk Esterase: Negative / RBC: 0-2 /HPF / WBC 0-2   Sq Epi: x / Non Sq Epi: Few / Bacteria: x      CAPILLARY BLOOD GLUCOSE      POCT Blood Glucose.: 123 mg/dL (2022 11:36)  POCT Blood Glucose.: 130 mg/dL (2022 10:25)  POCT Blood Glucose.: 131 mg/dL (2022 09:40)  POCT Blood Glucose.: 110 mg/dL (2022 08:45)  POCT Blood Glucose.: 128 mg/dL (2022 07:35)  POCT Blood Glucose.: 153 mg/dL (2022 06:35)  POCT Blood Glucose.: 149 mg/dL (2022 05:11)  POCT Blood Glucose.: 152 mg/dL (2022 02:06)  POCT Blood Glucose.: 152 mg/dL (2022 00:33)  POCT Blood Glucose.: 119 mg/dL (2022 23:27)  POCT Blood Glucose.: 146 mg/dL (2022 21:39)  POCT Blood Glucose.: 147 mg/dL (2022 20:37)  POCT Blood Glucose.: 133 mg/dL (2022 19:36)  POCT Blood Glucose.: 137 mg/dL (2022 18:36)  POCT Blood Glucose.: 136 mg/dL (2022 17:33)  POCT Blood Glucose.: 127 mg/dL (2022 16:44)  POCT Blood Glucose.: 128 mg/dL (2022 15:39)  POCT Blood Glucose.: 143 mg/dL (2022 14:24)  POCT Blood Glucose.: 163 mg/dL (2022 13:41)  POCT Blood Glucose.: 185 mg/dL (2022 12:32)          RADIOLOGY & ADDITIONAL TESTS:    Personally reviewed.     Consultant(s) Notes Reviewed:  [x] YES  [ ] NO

## 2022-02-25 NOTE — PHYSICAL THERAPY INITIAL EVALUATION ADULT - SITTING BALANCE: DYNAMIC
Continue the current medications.    *If you had blood work and/or x-rays done today, someone from our office will notify you if any of the results need immediate attention.  I would also like to remind you that in some cases the laboratory values may be outside of the normal limits but may not be of any significance and can be expected with your diagnosis/disease.  Otherwise your results will be discussed at your next follow up visit.   If you want to know your results before then, we recommend you sign up for the Patient Portal.      Return to clinic in 6 months.  Please call with any questions or concerns.    Thank you.   normal balance

## 2022-02-25 NOTE — PROGRESS NOTE ADULT - PROBLEM SELECTOR PLAN 5
-continue home atenolol with hold parameters  -may have to hold indapamide as a thiazide like med that could induce pancreatitis, monitor bp and conisder alternate med if needed

## 2022-02-25 NOTE — PROGRESS NOTE ADULT - SUBJECTIVE AND OBJECTIVE BOX
24 hours event: tonight pt reports feeling better and w/out complains, remains on insulin gtt. Patient found to have K of 2.7 overnight, repleated along with mag and phos    Review of Systems:  Constitutional: No fever, chills, fatigue  Neuro: No headache, numbness, weakness  Resp: No cough, wheezing, shortness of breath  CVS: No chest pain, palpitations, leg swelling  GI: No abdominal pain, nausea, vomiting, diarrhea   : No dysuria, frequency, incontinence  Skin: No itching, burning, rashes, or lesions   Msk: No joint pain or swelling  Psych: No depression, anxiety, mood swings    LABS:                        11.1   15.31 )-----------( 257      ( 2022 05:47 )             33.6         140  |  106  |  8   ----------------------------<  158<H>  3.7   |  28  |  0.57    Ca    9.0      2022 05:47  Phos  2.8       Mg     2.0         TPro  6.3  /  Alb  2.9<L>  /  TBili  0.4  /  DBili  x   /  AST  9<L>  /  ALT  28  /  AlkPhos  61      PT/INR - ( 2022 04:54 )   PT: 13.4 sec;   INR: 1.14 ratio           Urinalysis Basic - ( 2022 17:58 )    Color: Yellow / Appearance: Slightly Turbid / S.025 / pH: x  Gluc: x / Ketone: Negative  / Bili: Negative / Urobili: Negative   Blood: x / Protein: 30 mg/dL / Nitrite: Negative   Leuk Esterase: Negative / RBC: 0-2 /HPF / WBC 0-2   Sq Epi: x / Non Sq Epi: Few / Bacteria: x        VITAL SIGNS:  T(C): 37.4 (22 @ 03:36), Max: 38.2 (22 @ 19:11)  HR: 83 (22 @ 08:00) (80 - 116)  BP: 121/61 (22 @ 08:00) (102/58 - 137/71)  RR: 27 (22 @ 08:00) (16 - 46)  SpO2: 92% (22 @ 08:00) (90% - 97%)      Physical Exam:   Gen: Comfortable in bed in NAD  Neuro: AAOx3  HEENT: PERRL  Resp: CTA b/l  CVS: +RRR  Abd: BSx4, soft, nt/nd  Ext: no edema   Skin: warn/dry    MEDICATIONS  (STANDING):  ATENolol  Tablet 25 milliGRAM(s) Oral daily  dextrose 40% Gel 15 Gram(s) Oral once  dextrose 5% + lactated ringers. 1000 milliLiter(s) (150 mL/Hr) IV Continuous <Continuous>  dextrose 5%. 1000 milliLiter(s) (100 mL/Hr) IV Continuous <Continuous>  dextrose 5%. 1000 milliLiter(s) (50 mL/Hr) IV Continuous <Continuous>  dextrose 50% Injectable 25 Gram(s) IV Push once  dextrose 50% Injectable 12.5 Gram(s) IV Push once  dextrose 50% Injectable 25 Gram(s) IV Push once  dextrose 50% Injectable 50 milliLiter(s) IV Push every 15 minutes  dextrose 50% Injectable 25 milliLiter(s) IV Push every 15 minutes  enoxaparin Injectable 40 milliGRAM(s) SubCutaneous daily  fenofibrate Tablet 145 milliGRAM(s) Oral daily  glucagon  Injectable 1 milliGRAM(s) IntraMuscular once  insulin regular Infusion 4 Unit(s)/Hr (4 mL/Hr) IV Continuous <Continuous>  pancrelipase  (CREON 12,000 Lipase Units) 4 Capsule(s) Oral three times a day with meals    MEDICATIONS  (PRN):  acetaminophen     Tablet .. 650 milliGRAM(s) Oral every 6 hours PRN Temp greater or equal to 38C (100.4F), Mild Pain (1 - 3)  ALPRAZolam 0.5 milliGRAM(s) Oral at bedtime PRN anxiety  aluminum hydroxide/magnesium hydroxide/simethicone Suspension 30 milliLiter(s) Oral every 4 hours PRN Dyspepsia  HYDROmorphone  Injectable 1 milliGRAM(s) IV Push every 4 hours PRN Severe Pain (7 - 10)  melatonin 3 milliGRAM(s) Oral at bedtime PRN Insomnia  morphine  - Injectable 2 milliGRAM(s) IV Push every 4 hours PRN Moderate Pain (4 - 6)  ondansetron Injectable 4 milliGRAM(s) IV Push every 8 hours PRN Nausea and/or Vomiting    I&O's Detail    2022 07:01  -  2022 07:00  --------------------------------------------------------  IN:    dextrose 5% + lactated ringers: 3450 mL    Insulin: 62 mL    IV PiggyBack: 50 mL    IV PiggyBack: 450 mL    Lactated Ringers: 150 mL    Oral Fluid: 120 mL  Total IN: 4282 mL    OUT:    Voided (mL): 1645 mL  Total OUT: 1645 mL    Total NET: 2637 mL          Radiology:     < from: CT Abdomen and Pelvis w/ IV Cont (22 @ 18:34) >  ACC: [id number] EXAM:  CT ABDOMEN AND PELVIS IC                          PROCEDURE DATE:  2022          INTERPRETATION:  CLINICAL INFORMATION: diffuse abdominal pain nausea   diarrhea PCT    COMPARISON: 6...    CONTRAST/COMPLICATIONS:  IV Contrast: Omnipaque 350  90 cc administered   10 cc discarded  Oral Contrast: NONE  Complications: None reported at time of study completion    PROCEDURE:  CT of the Abdomen and Pelvis was performed.  Sagittal and coronal reformats were performed.    FINDINGS:    LOWER CHEST: Within normal limits.    LIVER: Hepatic steatosis.  BILE DUCTS: Normal caliber.  GALLBLADDER: Within normal limits.  SPLEEN: Within normal limits.  PANCREAS: Mild inflammation around the pancreas.  ADRENALS: Within normal limits.  KIDNEYS/URETERS: Within normal limits.    BLADDER: Within normal limits.  REPRODUCTIVE ORGANS: Within normal limits.    BOWEL: No bowel obstruction. The appendix is normal.  PERITONEUM: No ascites.  VESSELS:  Within normal limits.  RETROPERITONEUM/LYMPH NODES: No lymphadenopathy.  ABDOMINAL WALL: Within normal limits.  BONES: Within normal limits.    IMPRESSION: Acute interstitial edematous pancreatitis.    Hepatic steatosis.        --- End of Report ---        MARGARET GOODMAN MD; Attending Radiologist  This document has been electronically signed. 2022  7:00PM    < end of copied text >      CENTRAL LINE: N  KONG: N  A-LINE: N    GLOBAL ISSUE/BEST PRACTICE:  Analgesia: Y  Sedation: N  CAM-ICU: n/a  HOB elevation: Y  Stress ulcer prophylaxis: Y  VTE prophylaxis: Y  Glycemic control: Y  Nutrition: Y    CODE STATUS: FULL CODE

## 2022-02-25 NOTE — PROGRESS NOTE ADULT - ASSESSMENT
60 y/o F w/ pmh of diverticulitis, dm2, htn, hypertriglyceridemia, chronic pancreatitis, [facility] was found to have lipase of 6548, pt was made NPO started on IVF and IVAB and admitted to [facility], triglycerides came back elevated to 3370 and pt was transfer to ICU for insulin gtt.      -Neuro: no acute issues, Pain control w/ IV dilaudid and morphine  -Cardiac: HDS no acute issue , maintain MAP >65, htn on home atenolol  -Resp: No acute issues, maintain o2 >90%  -GI: Pancreatitis 2/2 to hypertriglyceridemia on insulin gtt at 4U/hr (will hold for now until K is corrected), maintain NPO status, cont D5LR at 150cc/hr cont home fenofibrate and creon, maintain NPO status  -Renal: Renal function stable, repeat labs shows hypoK, hypophos and hypomag will order KCL, amg and kphos for repletion and repeat lytes will hold insulin gtt for now until K is corrected  -ID: no acute infectious process   -Endo: DM2 will d/c ISS given pt has been started on insulin gtt (will hold for now until K is corrected) for hypertriglyceridemia monitor FS q1h  -Heme: DVT ppx w/ lovenox  -Dispo: Remains in MICU 2/2 to q1h FS while on insulin gtt, pt is full code, dispo pending hospital course

## 2022-02-25 NOTE — PROGRESS NOTE ADULT - PROBLEM SELECTOR PLAN 3
chronic, stable  -Hold oral hypoglycemic meds while in patient  -currently on insulin drip, apprec ICU plan of care and monitoring  -Finger sticks per routine, Hypoglycemia protocol  Consistent Carb Diet when able to tolerate PO   Hemoglobin A1c 6.9

## 2022-02-25 NOTE — PROGRESS NOTE ADULT - SUBJECTIVE AND OBJECTIVE BOX
HPI: 62 y/o F w/ pmh of diverticulitis, dm2, htn, hypertriglyceridemia, chronic pancreatitis, presented to [facility] on 2022 for diffuse abdominal pain was found to have lipase of 6548, pt was made NPO started on IVF and IVAB and admitted to [facility], triglycerides came back this morning elevated to 3370 and MICU consulted for evaluation Pt seen and examined today reports feeling better but still having abd pain, pt otherwise denies any other medical complains. Pt reports prior hx of pancreatitis 2 to hypertriglyceridemia for which she had required an insulin gtt.    24 hours event: tonight pt reports feeling better and w/out complains, remains on insulin gtt, repeat labs check noted to have a K of 237, phos of 2.3 and mag of 1.7.    Review of Systems:  Constitutional: No fever, chills, fatigue  Neuro: No headache, numbness, weakness  Resp: No cough, wheezing, shortness of breath  CVS: No chest pain, palpitations, leg swelling  GI: No abdominal pain, nausea, vomiting, diarrhea   : No dysuria, frequency, incontinence  Skin: No itching, burning, rashes, or lesions   Msk: No joint pain or swelling  Psych: No depression, anxiety, mood swings      T(F): 100.7 (22 @ 19:11), Max: 100.7 (22 @ 19:11)  HR: 85 (22 @ 21:00) (68 - 116)  BP: 117/63 (22 @ 21:00) (105/58 - 153/94)  RR: 23 (22 @ 21:00) (14 - 35)  SpO2: 90% (22 @ 21:00) (88% - 98%)  Wt(kg): --      22 @ 07:01  -  22 @ 07:00  --------------------------------------------------------  IN: 500 mL / OUT: 2 mL / NET: 498 mL    22 @ 07:01  -  22 @ 22:06  --------------------------------------------------------  IN: 2158 mL / OUT: 520 mL / NET: 1638 mL        CAPILLARY BLOOD GLUCOSE      POCT Blood Glucose.: 146 mg/dL (2022 21:39)      I&O's Summary    2022 07:01  -  2022 07:00  --------------------------------------------------------  IN: 500 mL / OUT: 2 mL / NET: 498 mL    2022 07:01  -  2022 22:06  --------------------------------------------------------  IN: 2158 mL / OUT: 520 mL / NET: 1638 mL        Physical Exam:   Gen: Comfortable in bed in NAD  Neuro: AAOx3  HEENT: PERRL  Resp: CTA b/l  CVS: +RRR  Abd: BSx4, soft, nt/nd  Ext: no edema   Skin: warn/dry    Meds:    ATENolol  Tablet Oral    dextrose 40% Gel Oral  dextrose 50% Injectable IV Push  dextrose 50% Injectable IV Push  dextrose 50% Injectable IV Push  dextrose 50% Injectable IV Push  dextrose 50% Injectable IV Push  fenofibrate Tablet Oral  glucagon  Injectable IntraMuscular  insulin regular Infusion IV Continuous      acetaminophen     Tablet .. Oral PRN  ALPRAZolam Oral PRN  HYDROmorphone  Injectable IV Push PRN  melatonin Oral PRN  morphine  - Injectable IV Push PRN  ondansetron Injectable IV Push PRN      enoxaparin Injectable SubCutaneous    aluminum hydroxide/magnesium hydroxide/simethicone Suspension Oral PRN  pancrelipase  (CREON 12,000 Lipase Units) Oral      dextrose 5% + lactated ringers. IV Continuous  dextrose 5%. IV Continuous  dextrose 5%. IV Continuous  magnesium sulfate  IVPB IV Intermittent  potassium chloride    Tablet ER Oral  potassium phosphate IVPB IV Intermittent                                  12.9   17.45 )-----------( 294      ( 2022 10:10 )             38.4           140  |  104  |  10  ----------------------------<  155<H>  2.7<LL>   |  30  |  0.56    Ca    8.9      2022 21:28  Phos  2.3       Mg     1.7         TPro  6.2  /  Alb  2.9<L>  /  TBili  0.4  /  DBili  x   /  AST  9<L>  /  ALT  32  /  AlkPhos  64      Lactate 1.2            @ 02:04          PT/INR - ( 2022 04:54 )   PT: 13.4 sec;   INR: 1.14 ratio           Urinalysis Basic - ( 2022 17:58 )    Color: Yellow / Appearance: Slightly Turbid / S.025 / pH: x  Gluc: x / Ketone: Negative  / Bili: Negative / Urobili: Negative   Blood: x / Protein: 30 mg/dL / Nitrite: Negative   Leuk Esterase: Negative / RBC: 0-2 /HPF / WBC 0-2   Sq Epi: x / Non Sq Epi: Few / Bacteria: x        Radiology:     < from: CT Abdomen and Pelvis w/ IV Cont (22 @ 18:34) >  ACC: [id number] EXAM:  CT ABDOMEN AND PELVIS IC                          PROCEDURE DATE:  2022          INTERPRETATION:  CLINICAL INFORMATION: diffuse abdominal pain nausea   diarrhea PCT    COMPARISON: 6.13.21.    CONTRAST/COMPLICATIONS:  IV Contrast: Omnipaque 350  90 cc administered   10 cc discarded  Oral Contrast: NONE  Complications: None reported at time of study completion    PROCEDURE:  CT of the Abdomen and Pelvis was performed.  Sagittal and coronal reformats were performed.    FINDINGS:    LOWER CHEST: Within normal limits.    LIVER: Hepatic steatosis.  BILE DUCTS: Normal caliber.  GALLBLADDER: Within normal limits.  SPLEEN: Within normal limits.  PANCREAS: Mild inflammation around the pancreas.  ADRENALS: Within normal limits.  KIDNEYS/URETERS: Within normal limits.    BLADDER: Within normal limits.  REPRODUCTIVE ORGANS: Within normal limits.    BOWEL: No bowel obstruction. The appendix is normal.  PERITONEUM: No ascites.  VESSELS:  Within normal limits.  RETROPERITONEUM/LYMPH NODES: No lymphadenopathy.  ABDOMINAL WALL: Within normal limits.  BONES: Within normal limits.    IMPRESSION: Acute interstitial edematous pancreatitis.    Hepatic steatosis.        --- End of Report ---        MARGARET GOODMAN MD; Attending Radiologist  This document has been electronically signed. 2022  7:00PM    < end of copied text >      CENTRAL LINE: N  KONG: N  A-LINE: N    GLOBAL ISSUE/BEST PRACTICE:  Analgesia: Y  Sedation: N  CAM-ICU: n/a  HOB elevation: Y  Stress ulcer prophylaxis: Y  VTE prophylaxis: Y  Glycemic control: Y  Nutrition: Y    CODE STATUS: FULL CODE

## 2022-02-26 LAB
ALBUMIN SERPL ELPH-MCNC: 2.6 G/DL — LOW (ref 3.3–5)
ALP SERPL-CCNC: 72 U/L — SIGNIFICANT CHANGE UP (ref 40–120)
ALT FLD-CCNC: 26 U/L — SIGNIFICANT CHANGE UP (ref 12–78)
ANION GAP SERPL CALC-SCNC: 5 MMOL/L — SIGNIFICANT CHANGE UP (ref 5–17)
AST SERPL-CCNC: 9 U/L — LOW (ref 15–37)
BASOPHILS # BLD AUTO: 0.07 K/UL — SIGNIFICANT CHANGE UP (ref 0–0.2)
BASOPHILS NFR BLD AUTO: 0.5 % — SIGNIFICANT CHANGE UP (ref 0–2)
BILIRUB SERPL-MCNC: 0.4 MG/DL — SIGNIFICANT CHANGE UP (ref 0.2–1.2)
BUN SERPL-MCNC: 6 MG/DL — LOW (ref 7–23)
CALCIUM SERPL-MCNC: 8.9 MG/DL — SIGNIFICANT CHANGE UP (ref 8.5–10.1)
CHLORIDE SERPL-SCNC: 107 MMOL/L — SIGNIFICANT CHANGE UP (ref 96–108)
CO2 SERPL-SCNC: 27 MMOL/L — SIGNIFICANT CHANGE UP (ref 22–31)
CREAT SERPL-MCNC: 0.55 MG/DL — SIGNIFICANT CHANGE UP (ref 0.5–1.3)
EOSINOPHIL # BLD AUTO: 0.51 K/UL — HIGH (ref 0–0.5)
EOSINOPHIL NFR BLD AUTO: 3.8 % — SIGNIFICANT CHANGE UP (ref 0–6)
GLUCOSE SERPL-MCNC: 146 MG/DL — HIGH (ref 70–99)
HCT VFR BLD CALC: 31.4 % — LOW (ref 34.5–45)
HGB BLD-MCNC: 10.2 G/DL — LOW (ref 11.5–15.5)
IMM GRANULOCYTES NFR BLD AUTO: 0.5 % — SIGNIFICANT CHANGE UP (ref 0–1.5)
LYMPHOCYTES # BLD AUTO: 2.79 K/UL — SIGNIFICANT CHANGE UP (ref 1–3.3)
LYMPHOCYTES # BLD AUTO: 20.9 % — SIGNIFICANT CHANGE UP (ref 13–44)
MAGNESIUM SERPL-MCNC: 2 MG/DL — SIGNIFICANT CHANGE UP (ref 1.6–2.6)
MCHC RBC-ENTMCNC: 27.1 PG — SIGNIFICANT CHANGE UP (ref 27–34)
MCHC RBC-ENTMCNC: 32.5 GM/DL — SIGNIFICANT CHANGE UP (ref 32–36)
MCV RBC AUTO: 83.5 FL — SIGNIFICANT CHANGE UP (ref 80–100)
MONOCYTES # BLD AUTO: 1.34 K/UL — HIGH (ref 0–0.9)
MONOCYTES NFR BLD AUTO: 10 % — SIGNIFICANT CHANGE UP (ref 2–14)
NEUTROPHILS # BLD AUTO: 8.6 K/UL — HIGH (ref 1.8–7.4)
NEUTROPHILS NFR BLD AUTO: 64.3 % — SIGNIFICANT CHANGE UP (ref 43–77)
NRBC # BLD: 0 /100 WBCS — SIGNIFICANT CHANGE UP (ref 0–0)
PHOSPHATE SERPL-MCNC: 2.4 MG/DL — LOW (ref 2.5–4.5)
PLATELET # BLD AUTO: 241 K/UL — SIGNIFICANT CHANGE UP (ref 150–400)
POTASSIUM SERPL-MCNC: 3.7 MMOL/L — SIGNIFICANT CHANGE UP (ref 3.5–5.3)
POTASSIUM SERPL-SCNC: 3.7 MMOL/L — SIGNIFICANT CHANGE UP (ref 3.5–5.3)
PROT SERPL-MCNC: 6.1 G/DL — SIGNIFICANT CHANGE UP (ref 6–8.3)
RBC # BLD: 3.76 M/UL — LOW (ref 3.8–5.2)
RBC # FLD: 15.5 % — HIGH (ref 10.3–14.5)
SODIUM SERPL-SCNC: 139 MMOL/L — SIGNIFICANT CHANGE UP (ref 135–145)
TRIGL SERPL-MCNC: 584 MG/DL — HIGH
WBC # BLD: 13.38 K/UL — HIGH (ref 3.8–10.5)
WBC # FLD AUTO: 13.38 K/UL — HIGH (ref 3.8–10.5)

## 2022-02-26 PROCEDURE — 99233 SBSQ HOSP IP/OBS HIGH 50: CPT | Mod: GC

## 2022-02-26 RX ORDER — IBUPROFEN 200 MG
400 TABLET ORAL EVERY 8 HOURS
Refills: 0 | Status: DISCONTINUED | OUTPATIENT
Start: 2022-02-26 | End: 2022-02-26

## 2022-02-26 RX ORDER — INSULIN LISPRO 100/ML
VIAL (ML) SUBCUTANEOUS
Refills: 0 | Status: DISCONTINUED | OUTPATIENT
Start: 2022-02-26 | End: 2022-02-27

## 2022-02-26 RX ORDER — IBUPROFEN 200 MG
400 TABLET ORAL EVERY 8 HOURS
Refills: 0 | Status: DISCONTINUED | OUTPATIENT
Start: 2022-02-26 | End: 2022-02-27

## 2022-02-26 RX ORDER — SODIUM,POTASSIUM PHOSPHATES 278-250MG
1 POWDER IN PACKET (EA) ORAL DAILY
Refills: 0 | Status: DISCONTINUED | OUTPATIENT
Start: 2022-02-26 | End: 2022-02-26

## 2022-02-26 RX ORDER — SODIUM,POTASSIUM PHOSPHATES 278-250MG
1 POWDER IN PACKET (EA) ORAL
Refills: 0 | Status: COMPLETED | OUTPATIENT
Start: 2022-02-26 | End: 2022-02-27

## 2022-02-26 RX ORDER — INSULIN LISPRO 100/ML
VIAL (ML) SUBCUTANEOUS
Refills: 0 | Status: DISCONTINUED | OUTPATIENT
Start: 2022-02-26 | End: 2022-02-26

## 2022-02-26 RX ORDER — ALPRAZOLAM 0.25 MG
0.5 TABLET ORAL EVERY 12 HOURS
Refills: 0 | Status: DISCONTINUED | OUTPATIENT
Start: 2022-02-26 | End: 2022-02-27

## 2022-02-26 RX ORDER — INSULIN HUMAN 100 [IU]/ML
INJECTION, SOLUTION SUBCUTANEOUS
Refills: 0 | Status: DISCONTINUED | OUTPATIENT
Start: 2022-02-26 | End: 2022-02-26

## 2022-02-26 RX ORDER — OMEGA-3 ACID ETHYL ESTERS 1 G
2 CAPSULE ORAL
Refills: 0 | Status: DISCONTINUED | OUTPATIENT
Start: 2022-02-26 | End: 2022-02-26

## 2022-02-26 RX ORDER — ATORVASTATIN CALCIUM 80 MG/1
40 TABLET, FILM COATED ORAL AT BEDTIME
Refills: 0 | Status: DISCONTINUED | OUTPATIENT
Start: 2022-02-26 | End: 2022-02-27

## 2022-02-26 RX ORDER — OMEGA-3 ACID ETHYL ESTERS 1 G
2 CAPSULE ORAL
Refills: 0 | Status: DISCONTINUED | OUTPATIENT
Start: 2022-02-26 | End: 2022-02-27

## 2022-02-26 RX ADMIN — Medication 400 MILLIGRAM(S): at 13:40

## 2022-02-26 RX ADMIN — INSULIN HUMAN 1: 100 INJECTION, SOLUTION SUBCUTANEOUS at 08:16

## 2022-02-26 RX ADMIN — ENOXAPARIN SODIUM 40 MILLIGRAM(S): 100 INJECTION SUBCUTANEOUS at 11:44

## 2022-02-26 RX ADMIN — Medication 4 CAPSULE(S): at 17:03

## 2022-02-26 RX ADMIN — ONDANSETRON 4 MILLIGRAM(S): 8 TABLET, FILM COATED ORAL at 09:07

## 2022-02-26 RX ADMIN — Medication 1 PACKET(S): at 17:03

## 2022-02-26 RX ADMIN — ATORVASTATIN CALCIUM 40 MILLIGRAM(S): 80 TABLET, FILM COATED ORAL at 21:16

## 2022-02-26 RX ADMIN — Medication 1 PACKET(S): at 11:44

## 2022-02-26 RX ADMIN — Medication 400 MILLIGRAM(S): at 22:58

## 2022-02-26 RX ADMIN — HYDROMORPHONE HYDROCHLORIDE 1 MILLIGRAM(S): 2 INJECTION INTRAMUSCULAR; INTRAVENOUS; SUBCUTANEOUS at 06:20

## 2022-02-26 RX ADMIN — HYDROMORPHONE HYDROCHLORIDE 1 MILLIGRAM(S): 2 INJECTION INTRAMUSCULAR; INTRAVENOUS; SUBCUTANEOUS at 01:00

## 2022-02-26 RX ADMIN — DEXTROSE MONOHYDRATE, SODIUM CHLORIDE, AND POTASSIUM CHLORIDE 150 MILLILITER(S): 50; .745; 4.5 INJECTION, SOLUTION INTRAVENOUS at 02:21

## 2022-02-26 RX ADMIN — Medication 1: at 11:49

## 2022-02-26 RX ADMIN — ATENOLOL 25 MILLIGRAM(S): 25 TABLET ORAL at 05:52

## 2022-02-26 RX ADMIN — Medication 4 CAPSULE(S): at 11:45

## 2022-02-26 RX ADMIN — HYDROMORPHONE HYDROCHLORIDE 1 MILLIGRAM(S): 2 INJECTION INTRAMUSCULAR; INTRAVENOUS; SUBCUTANEOUS at 00:02

## 2022-02-26 RX ADMIN — Medication 145 MILLIGRAM(S): at 11:44

## 2022-02-26 RX ADMIN — DEXTROSE MONOHYDRATE, SODIUM CHLORIDE, AND POTASSIUM CHLORIDE 150 MILLILITER(S): 50; .745; 4.5 INJECTION, SOLUTION INTRAVENOUS at 09:07

## 2022-02-26 RX ADMIN — Medication 0.5 MILLIGRAM(S): at 15:50

## 2022-02-26 RX ADMIN — Medication 400 MILLIGRAM(S): at 14:40

## 2022-02-26 RX ADMIN — Medication 4 CAPSULE(S): at 08:10

## 2022-02-26 RX ADMIN — Medication 0.5 MILLIGRAM(S): at 22:58

## 2022-02-26 RX ADMIN — Medication 650 MILLIGRAM(S): at 09:10

## 2022-02-26 RX ADMIN — Medication 2 CAPSULE(S): at 21:20

## 2022-02-26 RX ADMIN — INSULIN HUMAN 1: 100 INJECTION, SOLUTION SUBCUTANEOUS at 06:04

## 2022-02-26 RX ADMIN — Medication 400 MILLIGRAM(S): at 21:19

## 2022-02-26 RX ADMIN — HYDROMORPHONE HYDROCHLORIDE 1 MILLIGRAM(S): 2 INJECTION INTRAMUSCULAR; INTRAVENOUS; SUBCUTANEOUS at 05:53

## 2022-02-26 RX ADMIN — Medication 3 MILLIGRAM(S): at 21:16

## 2022-02-26 RX ADMIN — Medication 650 MILLIGRAM(S): at 08:10

## 2022-02-26 RX ADMIN — Medication 1 PACKET(S): at 21:15

## 2022-02-26 NOTE — PROGRESS NOTE ADULT - ATTENDING COMMENTS
61F h/o diverticulitis, DM2, HTN, hypertriglyceridemia, chronic pancreatitis a/w acute pancreatitis 2/2 hypertriglyceridemia on insulin gtt with improving symptoms and TG levels    Neuro: pain control with morphine/dilaudid  - continue home xanax; melatonin prn insomnia  CV: continue home atenolol  Pulm: no acute issues  GI: acute pancreatitis 2/2 hyperTGs, now improved to 721, can dc insulin gtt  - continue aggressive hydration, can start CLD if tolerates  - zofran/reglan as needed for nausea  - continue Creon when taking PO diet  Renal: electrolytes repleted, K added to IVF  - trend renal indices and UOP  ID: monitor off abx  Heme: dvt ppx with lovenox  Endo: continue fenofibrate  - FS premeal and qHS with ISS coverage
61F h/o diverticulitis, DM2, HTN, hypertriglyceridemia, chronic pancreatitis a/w acute pancreatitis 2/2 hypertriglyceridemia requiring insulin gtt with improving symptoms and TG levels.    Neuro: abd pain much improved, prn motrin for toe pain, ?mild gout flare  - continue home xanax; melatonin prn insomnia  CV: htn, continue home atenolol  start statin for hyperlipidemia  Pulm: no acute issues  GI: acute pancreatitis 2/2 hyperTGs, now improved to 584, continue fibrate, add fish oil  - advance to full liquids, and if tolerates then regular diet  - zofran/reglan as needed for nausea  - continue Creon when taking PO diet  Renal: normal renal function, d/c IVF  ID: monitor off abx  Heme: dvt ppx with lovenox  Endo: continue fenofibrate  - FS premeal and qHS with ISS coverage  Stable for floor  Plan discussed with pt
60 yo female with pmhx of chronic diverticulitis, type 2 DM, HTN, hypertriglyceridemia, chronic pancreatitis presents with complaint of diffuse abdominal pain admitted with acute pancreatitis. Now off insulin gtt, now on medical service Plan: advance diet as per GI recs, cont to trend lipase and tg, monitor clinical course, dc planning once tolerating regular and as per GI recs
60 yo female with pmhx of chronic diverticulitis, type 2 DM, HTN, hypertriglyceridemia, chronic pancreatitis presents with complaint of diffuse abdominal pain admitted with acute pancreatitis. Plan: cont ivf, apprec icu plan of care, cont insulin gtt, trend tg, improving, advance diet as per GI recs, monitor clinical course
60 yo female with pmhx of chronic diverticulitis, type 2 DM, HTN, hypertriglyceridemia, chronic pancreatitis presents with complaint of diffuse abdominal pain admitted with acute pancreatitis. Plan: apprec ICU plan of care, apprec GI recs, monitor clinical course, trend tg, advance diet as per gi and icu collaboration

## 2022-02-26 NOTE — PROGRESS NOTE ADULT - PROBLEM SELECTOR PLAN 2
acute on chronic  - ICU monitoring and insulin drip  - fenofibrate 145 mg oral daily
acute on chronic  - ICU monitoring and insulin drip  - fenofibrate 145 mg oral daily
acute on chronic  -Plan to downgrade to GMF now that insulin gtt discontinued    - fenofibrate 145 mg oral daily

## 2022-02-26 NOTE — PROGRESS NOTE ADULT - ASSESSMENT
·	Abdominal pain  ·	Pancreatitis  ·	Elevated triglycerides  ·	Recent diverticulitis      Monitor electrolytes closely, replete as needed  Advance diet as tolerated tomorrow. IF tolerates then can consider discharge from GI standpoint     I reviewed the overnight course of events on the unit, re-confirming the patient history. I discussed the care with the patient and their family  Differential diagnosis and plan of care discussed with patient after the evaluation  35 minutes spent on total encounter of which more than fifty percent of the encounter was spent counseling and/or coordinating care by the attending physician.  Advanced care planning was discussed with patient and family.  Advanced care planning forms were reviewed and discussed.  Risks, benefits and alternatives of gastroenterologic procedures were discussed in detail and all questions were answered.

## 2022-02-26 NOTE — PROGRESS NOTE ADULT - ASSESSMENT
62 y/o F w/ pmh of diverticulitis, dm2, htn, hypertriglyceridemia, chronic pancreatitis, presented to [facility] for diffuse abdominal pain was found to have lipase of 6548, pt was made NPO started on IVF and IVAB and admitted to tele, triglycerides came back elevated to 3370 and pt was transfer to ICU for insulin gtt.      -Neuro: no acute issues, Pain control w/ IV dilaudid and morphine  -Cardiac: HDS no acute issue , maintain MAP >65, htn on home atenolol  -Resp: No acute issues, maintain o2 >90%  -GI: Pancreatitis 2/2 to hypertriglyceridemia on insulin gtt at 4U/hr, maintain NPO status, cont D5LR at 150cc/hr cont home fenofibrate and creon. Triglycerides 721; Patient able to be transitioned to floors  -Renal: Renal function stable, Repleated overnight with  KCL, amg and kphos; repeat lytes wnl, continue insulin gtt  -ID: no acute infectious process   -Endo: DM2 will d/c ISS given pt has been started on insulin gtt for hypertriglyceridemia monitor FS q1h  -Heme: DVT ppx w/ lovenox  -Dispo: May be transitioned to floors; triglycerides <1000 62 y/o F w/ pmh of diverticulitis, dm2, htn, hypertriglyceridemia, chronic pancreatitis, presented to [facility] for diffuse abdominal pain was found to have lipase of 6548, pt was made NPO started on IVF and IVAB and admitted to [facility], triglycerides came back elevated to 3370 and pt was transfer to ICU for insulin gtt.      -Neuro: no acute issues, D/c pain control IV dilaudid and morphine. Started motrin 400 mg q8 for foot pain  -Cardiac: HDS no acute issue , maintain MAP >65, htn on home atenolol. Started statin and fish oil  -Resp: No acute issues, maintain o2 >90%  -GI: Pancreatitis 2/2 to hypertriglyceridemia. Stopped insulin ggt, started insulin ss. Stopped D5+ LR. Advance diet to full liquid. Cont home fenofibrate and creon. Triglycerides 721; Patient able to be transitioned to floors. f/u repeat lipid panel in AM  -Renal: Renal function stable  -ID: no acute infectious process   -Endo: DM, dc insulin ggt, started on insulin ss  -Heme: DVT ppx w/ lovenox  -Dispo: May be transitioned to floors; triglycerides <1000

## 2022-02-26 NOTE — PROGRESS NOTE ADULT - SUBJECTIVE AND OBJECTIVE BOX
Patient is a 61y old  Female who presents with a chief complaint of abdominal pain (26 Feb 2022 09:09)    24 hour events: complains of moderate right foot pain    REVIEW OF SYSTEMS  Constitutional: No fever, chills, fatigue  Neuro: No headache, numbness, weakness  Resp: No cough, wheezing, shortness of breath  CVS: No chest pain, palpitations, leg swelling  GI: No vomiting, diarrhea. Mild abdominal soreness, nausea  : No dysuria, frequency, incontinence  Skin: No itching, burning, rashes, or lesions   Msk: +right toe pain  Heme: No bleeding    T(F): 97.4 (02-26-22 @ 08:12), Max: 98.7 (02-25-22 @ 12:00)  HR: 72 (02-26-22 @ 08:12) (67 - 86)  BP: 143/75 (02-26-22 @ 08:12) (108/59 - 153/84)  RR: 18 (02-26-22 @ 08:12) (18 - 50)  SpO2: 95% (02-26-22 @ 08:12) (91% - 97%)  Wt(kg): --            I&O's Summary    02-25 @ 07:01  -  02-26 @ 07:00  --------------------------------------------------------  IN: 3800 mL / OUT: 354 mL / NET: 3446 mL    02-26 @ 07:01  -  02-26 @ 11:00  --------------------------------------------------------  IN: 300 mL / OUT: 0 mL / NET: 300 mL      Physical Exam:   Gen: Comfortable in bed in NAD  Neuro: AAOx3  HEENT: PERRL  Resp: CTA b/l  CVS: +RRR  Abd: BSx4, soft, nt/nd  Ext: +erythematous right 1st digit no edema  Skin: warm/dry    MEDICATIONS    ATENolol  Tablet Oral    atorvastatin Oral  dextrose 40% Gel Oral  dextrose 50% Injectable IV Push  dextrose 50% Injectable IV Push  dextrose 50% Injectable IV Push  dextrose 50% Injectable IV Push  dextrose 50% Injectable IV Push  fenofibrate Tablet Oral  glucagon  Injectable IntraMuscular  insulin lispro (ADMELOG) corrective regimen sliding scale SubCutaneous      acetaminophen     Tablet .. Oral PRN  ALPRAZolam Oral PRN  ibuprofen  Tablet. Oral  melatonin Oral PRN  ondansetron Injectable IV Push PRN      enoxaparin Injectable SubCutaneous    aluminum hydroxide/magnesium hydroxide/simethicone Suspension Oral PRN  pancrelipase  (CREON 12,000 Lipase Units) Oral      dextrose 5%. IV Continuous  dextrose 5%. IV Continuous  potassium phosphate / sodium phosphate Powder (PHOS-NaK) Oral        omega-3-fatty acids. Oral                          10.2   13.38 )-----------( 241      ( 26 Feb 2022 05:35 )             31.4       02-26    139  |  107  |  6<L>  ----------------------------<  146<H>  3.7   |  27  |  0.55    Ca    8.9      26 Feb 2022 05:35  Phos  2.4     02-26  Mg     2.0     02-26    TPro  6.1  /  Alb  2.6<L>  /  TBili  0.4  /  DBili  x   /  AST  9<L>  /  ALT  26  /  AlkPhos  72  02-26                      CENTRAL LINE: N  KONG: N  A-LINE: N    GLOBAL ISSUE/BEST PRACTICE:  Analgesia: Y  Sedation: N  CAM-ICU: n/a  HOB elevation: Y  Stress ulcer prophylaxis: Y  VTE prophylaxis: Y  Glycemic control: Y  Nutrition: Y    CODE STATUS: FULL CODE       Patient is a 61y old  Female who presents with a chief complaint of abdominal pain (26 Feb 2022 09:09)    24 hour events: complains of moderate right great toe pain and edema, similar to prior gout attacks  no nausea/vomiting/abd pain, wants to eat regular diet    REVIEW OF SYSTEMS  Constitutional: No fever, chills, fatigue  Neuro: No headache, numbness, weakness  Resp: No cough, wheezing, shortness of breath  CVS: No chest pain, palpitations, leg swelling  GI: No vomiting, diarrhea. Mild abdominal soreness, nausea  Msk: +right toe pain  Heme: No bleeding    T(F): 97.4 (02-26-22 @ 08:12), Max: 98.7 (02-25-22 @ 12:00)  HR: 72 (02-26-22 @ 08:12) (67 - 86)  BP: 143/75 (02-26-22 @ 08:12) (108/59 - 153/84)  RR: 18 (02-26-22 @ 08:12) (18 - 50)  SpO2: 95% (02-26-22 @ 08:12) (91% - 97%)  Wt(kg): --            I&O's Summary    02-25 @ 07:01  -  02-26 @ 07:00  --------------------------------------------------------  IN: 3800 mL / OUT: 354 mL / NET: 3446 mL    02-26 @ 07:01  -  02-26 @ 11:00  --------------------------------------------------------  IN: 300 mL / OUT: 0 mL / NET: 300 mL      Physical Exam:   Gen: Comfortable in bed in NAD  Neuro: AAOx3  HEENT: PERRL  Resp: CTA b/l  CVS: +RRR  Abd: BSx4, soft, nt/nd  Ext: +erythematous right 1st digit, mild edema, minimal tenderness to palpation  Skin: warm/dry    MEDICATIONS    ATENolol  Tablet Oral    atorvastatin Oral  dextrose 40% Gel Oral  dextrose 50% Injectable IV Push  dextrose 50% Injectable IV Push  dextrose 50% Injectable IV Push  dextrose 50% Injectable IV Push  dextrose 50% Injectable IV Push  fenofibrate Tablet Oral  glucagon  Injectable IntraMuscular  insulin lispro (ADMELOG) corrective regimen sliding scale SubCutaneous      acetaminophen     Tablet .. Oral PRN  ALPRAZolam Oral PRN  ibuprofen  Tablet. Oral  melatonin Oral PRN  ondansetron Injectable IV Push PRN      enoxaparin Injectable SubCutaneous    aluminum hydroxide/magnesium hydroxide/simethicone Suspension Oral PRN  pancrelipase  (CREON 12,000 Lipase Units) Oral      dextrose 5%. IV Continuous  dextrose 5%. IV Continuous  potassium phosphate / sodium phosphate Powder (PHOS-NaK) Oral        omega-3-fatty acids. Oral                          10.2   13.38 )-----------( 241      ( 26 Feb 2022 05:35 )             31.4       02-26    139  |  107  |  6<L>  ----------------------------<  146<H>  3.7   |  27  |  0.55    Ca    8.9      26 Feb 2022 05:35  Phos  2.4     02-26  Mg     2.0     02-26    TPro  6.1  /  Alb  2.6<L>  /  TBili  0.4  /  DBili  x   /  AST  9<L>  /  ALT  26  /  AlkPhos  72  02-26        CENTRAL LINE: N  KONG: N  A-LINE: N    GLOBAL ISSUE/BEST PRACTICE:  Analgesia: Y  Sedation: N  CAM-ICU: n/a  HOB elevation: Y  Stress ulcer prophylaxis: Y  VTE prophylaxis: Y  Glycemic control: Y  Nutrition: Y    CODE STATUS: FULL CODE

## 2022-02-26 NOTE — PROGRESS NOTE ADULT - SUBJECTIVE AND OBJECTIVE BOX
Patient is a 61y old  Female who presents with a chief complaint of abdominal pain (25 Feb 2022 12:02)      INTERVAL HPI/OVERNIGHT EVENTS: Patient resting comfortably in bed, feeling well, complaining of nausea. Denies fevers, chills, headache, lightheadedness, chest pain, dyspnea, vomiting, diarrhea, constipation. Reports mild abd pain.   No overnight events occurred.    MEDICATIONS  (STANDING):  ATENolol  Tablet 25 milliGRAM(s) Oral daily  dextrose 40% Gel 15 Gram(s) Oral once  dextrose 5% + lactated ringers with potassium chloride 20 mEq/L 1000 milliLiter(s) (150 mL/Hr) IV Continuous <Continuous>  dextrose 5%. 1000 milliLiter(s) (50 mL/Hr) IV Continuous <Continuous>  dextrose 5%. 1000 milliLiter(s) (100 mL/Hr) IV Continuous <Continuous>  dextrose 50% Injectable 25 Gram(s) IV Push once  dextrose 50% Injectable 12.5 Gram(s) IV Push once  dextrose 50% Injectable 25 Gram(s) IV Push once  dextrose 50% Injectable 50 milliLiter(s) IV Push every 15 minutes  dextrose 50% Injectable 25 milliLiter(s) IV Push every 15 minutes  enoxaparin Injectable 40 milliGRAM(s) SubCutaneous daily  fenofibrate Tablet 145 milliGRAM(s) Oral daily  glucagon  Injectable 1 milliGRAM(s) IntraMuscular once  insulin lispro (ADMELOG) corrective regimen sliding scale   SubCutaneous at bedtime  insulin regular  human corrective regimen sliding scale   SubCutaneous three times a day with meals  pancrelipase  (CREON 12,000 Lipase Units) 4 Capsule(s) Oral three times a day with meals    MEDICATIONS  (PRN):  acetaminophen     Tablet .. 650 milliGRAM(s) Oral every 6 hours PRN Temp greater or equal to 38C (100.4F), Mild Pain (1 - 3)  ALPRAZolam 0.5 milliGRAM(s) Oral at bedtime PRN anxiety  aluminum hydroxide/magnesium hydroxide/simethicone Suspension 30 milliLiter(s) Oral every 4 hours PRN Dyspepsia  HYDROmorphone  Injectable 1 milliGRAM(s) IV Push every 4 hours PRN Severe Pain (7 - 10)  melatonin 3 milliGRAM(s) Oral at bedtime PRN Insomnia  morphine  - Injectable 2 milliGRAM(s) IV Push every 4 hours PRN Moderate Pain (4 - 6)  ondansetron Injectable 4 milliGRAM(s) IV Push every 8 hours PRN Nausea and/or Vomiting      Allergies    No Known Allergies    Intolerances    codeine (Drowsiness)      REVIEW OF SYSTEMS:  CONSTITUTIONAL: No fever or chills  HEENT:  No headache, no sore throat  RESPIRATORY: No cough, wheezing, or shortness of breath  CARDIOVASCULAR: No chest pain, palpitations  GASTROINTESTINAL: No vomiting, or diarrhea. + abd pain and nausea   GENITOURINARY: No dysuria, frequency, or hematuria  NEUROLOGICAL: no focal weakness or dizziness  MUSCULOSKELETAL: no myalgias     Vital Signs Last 24 Hrs  T(C): 36.3 (26 Feb 2022 08:12), Max: 37.1 (25 Feb 2022 12:00)  T(F): 97.4 (26 Feb 2022 08:12), Max: 98.7 (25 Feb 2022 12:00)  HR: 72 (26 Feb 2022 08:12) (67 - 86)  BP: 143/75 (26 Feb 2022 08:12) (108/59 - 153/84)  BP(mean): 77 (26 Feb 2022 07:00) (77 - 112)  RR: 18 (26 Feb 2022 08:12) (18 - 50)  SpO2: 95% (26 Feb 2022 08:12) (91% - 97%)    PHYSICAL EXAM:  GENERAL: not in acute distress at rest   HEENT:  anicteric, moist mucous membranes  CHEST/LUNG:  CTA b/l, no rales, wheezes, or rhonchi  HEART:  RRR, S1, S2  ABDOMEN:  BS+, soft, mild right-sided tenderness, nondistended  EXTREMITIES: no edema, cyanosis, or calf tenderness  NERVOUS SYSTEM: answers questions and follows commands appropriately    LABS:                        10.2   13.38 )-----------( 241      ( 26 Feb 2022 05:35 )             31.4     CBC Full  -  ( 26 Feb 2022 05:35 )  WBC Count : 13.38 K/uL  Hemoglobin : 10.2 g/dL  Hematocrit : 31.4 %  Platelet Count - Automated : 241 K/uL  Mean Cell Volume : 83.5 fl  Mean Cell Hemoglobin : 27.1 pg  Mean Cell Hemoglobin Concentration : 32.5 gm/dL  Auto Neutrophil # : 8.60 K/uL  Auto Lymphocyte # : 2.79 K/uL  Auto Monocyte # : 1.34 K/uL  Auto Eosinophil # : 0.51 K/uL  Auto Basophil # : 0.07 K/uL  Auto Neutrophil % : 64.3 %  Auto Lymphocyte % : 20.9 %  Auto Monocyte % : 10.0 %  Auto Eosinophil % : 3.8 %  Auto Basophil % : 0.5 %    26 Feb 2022 05:35    139    |  107    |  6      ----------------------------<  146    3.7     |  27     |  0.55     Ca    8.9        26 Feb 2022 05:35  Phos  2.4       26 Feb 2022 05:35  Mg     2.0       26 Feb 2022 05:35    TPro  6.1    /  Alb  2.6    /  TBili  0.4    /  DBili  x      /  AST  9      /  ALT  26     /  AlkPhos  72     26 Feb 2022 05:35        CAPILLARY BLOOD GLUCOSE      POCT Blood Glucose.: 168 mg/dL (26 Feb 2022 07:52)  POCT Blood Glucose.: 153 mg/dL (26 Feb 2022 06:01)  POCT Blood Glucose.: 133 mg/dL (25 Feb 2022 22:13)  POCT Blood Glucose.: 168 mg/dL (25 Feb 2022 17:53)  POCT Blood Glucose.: 122 mg/dL (25 Feb 2022 13:31)  POCT Blood Glucose.: 137 mg/dL (25 Feb 2022 12:27)  POCT Blood Glucose.: 123 mg/dL (25 Feb 2022 11:36)  POCT Blood Glucose.: 130 mg/dL (25 Feb 2022 10:25)  POCT Blood Glucose.: 131 mg/dL (25 Feb 2022 09:40)          RADIOLOGY & ADDITIONAL TESTS:    Personally reviewed.     Consultant(s) Notes Reviewed:  [x] YES  [ ] NO

## 2022-02-26 NOTE — PROGRESS NOTE ADULT - PROBLEM SELECTOR PLAN 1
-CT with Acute interstitial edematous pancreatitis likely 2/2 hypertriglyceridemia  patient reports multiple episodes in the past, all 2/2 hypertriglyceridemia  -Rutland score on admission 3 (WBC>16, age >55, AST>250)- severe pancreatitis likely.   -triglycerides in am 3327->2001->721  -Plan to downgrade to [facility] now that insulin gtt discontinued    -Replete electrolytes PRN  -Clear liquid diet, will advance as tolerated  -fenofibrate 145 mg oral daily  -hydromorphone for pain control as needed, monitor response to pain and adjust as necessary. zofran PRN for nausea   -GI Dr. Marshall consulted, fu recs

## 2022-02-26 NOTE — PROGRESS NOTE ADULT - ASSESSMENT
62 yo female with pmhx of chronic diverticulitis, type 2 DM, HTN, hypertriglyceridemia, chronic pancreatitis presents with complaint of diffuse abdominal pain admitted with acute pancreatitis. Now off insulin gtt. Plan to downgrade to F.

## 2022-02-26 NOTE — PROGRESS NOTE ADULT - PROBLEM SELECTOR PLAN 3
chronic, stable  -Hold oral hypoglycemic meds while in patient  -now off insulin gtt, low dose sliding scale started   -Finger sticks per routine, Hypoglycemia protocol  -clear liquids now, will advance to consistent Carb Diet as tolerated   -Hemoglobin A1c 6.9

## 2022-02-26 NOTE — PROGRESS NOTE ADULT - TIME BILLING
care coordination, plan of care discussed with patient face to face
care coordination, plan of care discussed with patient face to face, Dr Flores ICU attending
care coordination, plan of care discussed with patient face to face, Dr Flores ICU Attending

## 2022-02-26 NOTE — PROGRESS NOTE ADULT - SUBJECTIVE AND OBJECTIVE BOX
INTERVAL HPI/OVERNIGHT EVENTS:  Feeling well     MEDICATIONS  (STANDING):  ATENolol  Tablet 25 milliGRAM(s) Oral daily  dextrose 40% Gel 15 Gram(s) Oral once  dextrose 5% + lactated ringers with potassium chloride 20 mEq/L 1000 milliLiter(s) (150 mL/Hr) IV Continuous <Continuous>  dextrose 5%. 1000 milliLiter(s) (100 mL/Hr) IV Continuous <Continuous>  dextrose 5%. 1000 milliLiter(s) (50 mL/Hr) IV Continuous <Continuous>  dextrose 50% Injectable 25 Gram(s) IV Push once  dextrose 50% Injectable 12.5 Gram(s) IV Push once  dextrose 50% Injectable 25 Gram(s) IV Push once  dextrose 50% Injectable 50 milliLiter(s) IV Push every 15 minutes  dextrose 50% Injectable 25 milliLiter(s) IV Push every 15 minutes  enoxaparin Injectable 40 milliGRAM(s) SubCutaneous daily  fenofibrate Tablet 145 milliGRAM(s) Oral daily  glucagon  Injectable 1 milliGRAM(s) IntraMuscular once  insulin regular Infusion 4 Unit(s)/Hr (4 mL/Hr) IV Continuous <Continuous>    MEDICATIONS  (PRN):  acetaminophen     Tablet .. 650 milliGRAM(s) Oral every 6 hours PRN Temp greater or equal to 38C (100.4F), Mild Pain (1 - 3)  ALPRAZolam 0.5 milliGRAM(s) Oral at bedtime PRN anxiety  aluminum hydroxide/magnesium hydroxide/simethicone Suspension 30 milliLiter(s) Oral every 4 hours PRN Dyspepsia  HYDROmorphone  Injectable 1 milliGRAM(s) IV Push every 4 hours PRN Severe Pain (7 - 10)  melatonin 3 milliGRAM(s) Oral at bedtime PRN Insomnia  morphine  - Injectable 2 milliGRAM(s) IV Push every 4 hours PRN Moderate Pain (4 - 6)  ondansetron Injectable 4 milliGRAM(s) IV Push every 8 hours PRN Nausea and/or Vomiting      Allergies    No Known Allergies    Intolerances    codeine (Drowsiness)    PHYSICAL EXAM:   Vital Signs:  Vital Signs Last 24 Hrs  T(C): 37.4 (2022 03:36), Max: 38.2 (2022 19:11)  T(F): 99.4 (2022 03:36), Max: 100.7 (2022 19:11)  HR: 78 (2022 09:00) (78 - 102)  BP: 113/59 (2022 09:00) (102/58 - 137/71)  BP(mean): 80 (2022 09:00) (76 - 98)  RR: 27 (2022 09:00) (19 - 46)  SpO2: 93% (2022 09:00) (90% - 97%)  Daily     Daily     GENERAL:  Appears stated age  ABDOMEN:  Soft, +TTP to deep palpation of epigastric region, non-distended  NEURO:  Alert      LABS:                        11.1   15.31 )-----------( 257      ( 2022 05:47 )             33.6     02    140  |  106  |  8   ----------------------------<  158<H>  3.7   |  28  |  0.57    Ca    9.0      2022 05:47  Phos  2.8       Mg     2.0         TPro  6.3  /  Alb  2.9<L>  /  TBili  0.4  /  DBili  x   /  AST  9<L>  /  ALT  28  /  AlkPhos  61  02-25    PT/INR - ( 2022 04:54 )   PT: 13.4 sec;   INR: 1.14 ratio           Urinalysis Basic - ( 2022 17:58 )    Color: Yellow / Appearance: Slightly Turbid / S.025 / pH: x  Gluc: x / Ketone: Negative  / Bili: Negative / Urobili: Negative   Blood: x / Protein: 30 mg/dL / Nitrite: Negative   Leuk Esterase: Negative / RBC: 0-2 /HPF / WBC 0-2   Sq Epi: x / Non Sq Epi: Few / Bacteria: x

## 2022-02-27 ENCOUNTER — TRANSCRIPTION ENCOUNTER (OUTPATIENT)
Age: 62
End: 2022-02-27

## 2022-02-27 VITALS
DIASTOLIC BLOOD PRESSURE: 85 MMHG | OXYGEN SATURATION: 92 % | RESPIRATION RATE: 18 BRPM | HEART RATE: 62 BPM | SYSTOLIC BLOOD PRESSURE: 134 MMHG | TEMPERATURE: 98 F

## 2022-02-27 LAB
ALBUMIN SERPL ELPH-MCNC: 2.8 G/DL — LOW (ref 3.3–5)
ALP SERPL-CCNC: 79 U/L — SIGNIFICANT CHANGE UP (ref 40–120)
ALT FLD-CCNC: 39 U/L — SIGNIFICANT CHANGE UP (ref 12–78)
ANION GAP SERPL CALC-SCNC: 6 MMOL/L — SIGNIFICANT CHANGE UP (ref 5–17)
AST SERPL-CCNC: 18 U/L — SIGNIFICANT CHANGE UP (ref 15–37)
BASOPHILS # BLD AUTO: 0.06 K/UL — SIGNIFICANT CHANGE UP (ref 0–0.2)
BASOPHILS NFR BLD AUTO: 0.7 % — SIGNIFICANT CHANGE UP (ref 0–2)
BILIRUB SERPL-MCNC: 0.4 MG/DL — SIGNIFICANT CHANGE UP (ref 0.2–1.2)
BUN SERPL-MCNC: 11 MG/DL — SIGNIFICANT CHANGE UP (ref 7–23)
CALCIUM SERPL-MCNC: 9.2 MG/DL — SIGNIFICANT CHANGE UP (ref 8.5–10.1)
CHLORIDE SERPL-SCNC: 108 MMOL/L — SIGNIFICANT CHANGE UP (ref 96–108)
CO2 SERPL-SCNC: 27 MMOL/L — SIGNIFICANT CHANGE UP (ref 22–31)
CREAT SERPL-MCNC: 0.5 MG/DL — SIGNIFICANT CHANGE UP (ref 0.5–1.3)
EOSINOPHIL # BLD AUTO: 0.42 K/UL — SIGNIFICANT CHANGE UP (ref 0–0.5)
EOSINOPHIL NFR BLD AUTO: 4.6 % — SIGNIFICANT CHANGE UP (ref 0–6)
GLUCOSE SERPL-MCNC: 160 MG/DL — HIGH (ref 70–99)
HCT VFR BLD CALC: 33.4 % — LOW (ref 34.5–45)
HGB BLD-MCNC: 10.9 G/DL — LOW (ref 11.5–15.5)
IMM GRANULOCYTES NFR BLD AUTO: 0.2 % — SIGNIFICANT CHANGE UP (ref 0–1.5)
LIDOCAIN IGE QN: 283 U/L — SIGNIFICANT CHANGE UP (ref 73–393)
LYMPHOCYTES # BLD AUTO: 1.8 K/UL — SIGNIFICANT CHANGE UP (ref 1–3.3)
LYMPHOCYTES # BLD AUTO: 19.5 % — SIGNIFICANT CHANGE UP (ref 13–44)
MCHC RBC-ENTMCNC: 26.9 PG — LOW (ref 27–34)
MCHC RBC-ENTMCNC: 32.6 GM/DL — SIGNIFICANT CHANGE UP (ref 32–36)
MCV RBC AUTO: 82.5 FL — SIGNIFICANT CHANGE UP (ref 80–100)
MONOCYTES # BLD AUTO: 0.83 K/UL — SIGNIFICANT CHANGE UP (ref 0–0.9)
MONOCYTES NFR BLD AUTO: 9 % — SIGNIFICANT CHANGE UP (ref 2–14)
NEUTROPHILS # BLD AUTO: 6.09 K/UL — SIGNIFICANT CHANGE UP (ref 1.8–7.4)
NEUTROPHILS NFR BLD AUTO: 66 % — SIGNIFICANT CHANGE UP (ref 43–77)
NRBC # BLD: 0 /100 WBCS — SIGNIFICANT CHANGE UP (ref 0–0)
PLATELET # BLD AUTO: 272 K/UL — SIGNIFICANT CHANGE UP (ref 150–400)
POTASSIUM SERPL-MCNC: 3.6 MMOL/L — SIGNIFICANT CHANGE UP (ref 3.5–5.3)
POTASSIUM SERPL-SCNC: 3.6 MMOL/L — SIGNIFICANT CHANGE UP (ref 3.5–5.3)
PROT SERPL-MCNC: 6.8 G/DL — SIGNIFICANT CHANGE UP (ref 6–8.3)
RBC # BLD: 4.05 M/UL — SIGNIFICANT CHANGE UP (ref 3.8–5.2)
RBC # FLD: 15 % — HIGH (ref 10.3–14.5)
SODIUM SERPL-SCNC: 141 MMOL/L — SIGNIFICANT CHANGE UP (ref 135–145)
TRIGL SERPL-MCNC: 637 MG/DL — HIGH
WBC # BLD: 9.22 K/UL — SIGNIFICANT CHANGE UP (ref 3.8–10.5)
WBC # FLD AUTO: 9.22 K/UL — SIGNIFICANT CHANGE UP (ref 3.8–10.5)

## 2022-02-27 PROCEDURE — 86803 HEPATITIS C AB TEST: CPT

## 2022-02-27 PROCEDURE — 84478 ASSAY OF TRIGLYCERIDES: CPT

## 2022-02-27 PROCEDURE — U0003: CPT

## 2022-02-27 PROCEDURE — 74177 CT ABD & PELVIS W/CONTRAST: CPT | Mod: MA

## 2022-02-27 PROCEDURE — 97163 PT EVAL HIGH COMPLEX 45 MIN: CPT

## 2022-02-27 PROCEDURE — 96376 TX/PRO/DX INJ SAME DRUG ADON: CPT

## 2022-02-27 PROCEDURE — 83735 ASSAY OF MAGNESIUM: CPT

## 2022-02-27 PROCEDURE — 96374 THER/PROPH/DIAG INJ IV PUSH: CPT

## 2022-02-27 PROCEDURE — 80053 COMPREHEN METABOLIC PANEL: CPT

## 2022-02-27 PROCEDURE — 99232 SBSQ HOSP IP/OBS MODERATE 35: CPT

## 2022-02-27 PROCEDURE — 83036 HEMOGLOBIN GLYCOSYLATED A1C: CPT

## 2022-02-27 PROCEDURE — 84100 ASSAY OF PHOSPHORUS: CPT

## 2022-02-27 PROCEDURE — 82962 GLUCOSE BLOOD TEST: CPT

## 2022-02-27 PROCEDURE — 96375 TX/PRO/DX INJ NEW DRUG ADDON: CPT

## 2022-02-27 PROCEDURE — 81001 URINALYSIS AUTO W/SCOPE: CPT

## 2022-02-27 PROCEDURE — 83690 ASSAY OF LIPASE: CPT

## 2022-02-27 PROCEDURE — 82803 BLOOD GASES ANY COMBINATION: CPT

## 2022-02-27 PROCEDURE — 80061 LIPID PANEL: CPT

## 2022-02-27 PROCEDURE — 99285 EMERGENCY DEPT VISIT HI MDM: CPT

## 2022-02-27 PROCEDURE — 36415 COLL VENOUS BLD VENIPUNCTURE: CPT

## 2022-02-27 PROCEDURE — U0005: CPT

## 2022-02-27 PROCEDURE — 83605 ASSAY OF LACTIC ACID: CPT

## 2022-02-27 PROCEDURE — 85025 COMPLETE CBC W/AUTO DIFF WBC: CPT

## 2022-02-27 PROCEDURE — 85610 PROTHROMBIN TIME: CPT

## 2022-02-27 RX ORDER — INDAPAMIDE 1.25 MG
1 TABLET ORAL
Qty: 0 | Refills: 0 | DISCHARGE

## 2022-02-27 RX ORDER — ATORVASTATIN CALCIUM 80 MG/1
1 TABLET, FILM COATED ORAL
Qty: 14 | Refills: 0
Start: 2022-02-27 | End: 2022-03-12

## 2022-02-27 RX ADMIN — Medication 1: at 08:12

## 2022-02-27 RX ADMIN — Medication 145 MILLIGRAM(S): at 12:24

## 2022-02-27 RX ADMIN — Medication 400 MILLIGRAM(S): at 13:37

## 2022-02-27 RX ADMIN — Medication 2 CAPSULE(S): at 05:21

## 2022-02-27 RX ADMIN — Medication 2 CAPSULE(S): at 16:50

## 2022-02-27 RX ADMIN — Medication 4 CAPSULE(S): at 08:14

## 2022-02-27 RX ADMIN — ATENOLOL 25 MILLIGRAM(S): 25 TABLET ORAL at 05:18

## 2022-02-27 RX ADMIN — Medication 1 PACKET(S): at 08:14

## 2022-02-27 RX ADMIN — Medication 4 CAPSULE(S): at 12:23

## 2022-02-27 NOTE — PROVIDER CONTACT NOTE (MEDICATION) - ACTION/TREATMENT ORDERED:
Provider gave RN order okay to give xanax at bedtime as per pt's request. RN to continue to monitor pt.

## 2022-02-27 NOTE — PROGRESS NOTE ADULT - ASSESSMENT
·	Abdominal pain  ·	Pancreatitis  ·	Elevated triglycerides  ·	Recent diverticulitis      Monitor electrolytes closely, replete as needed  Advance diet as tolerated today. IF tolerates then can consider discharge from GI standpoint     I reviewed the overnight course of events on the unit, re-confirming the patient history. I discussed the care with the patient and their family  Differential diagnosis and plan of care discussed with patient after the evaluation  35 minutes spent on total encounter of which more than fifty percent of the encounter was spent counseling and/or coordinating care by the attending physician.  Advanced care planning was discussed with patient and family.  Advanced care planning forms were reviewed and discussed.  Risks, benefits and alternatives of gastroenterologic procedures were discussed in detail and all questions were answered.

## 2022-02-27 NOTE — PROGRESS NOTE ADULT - ASSESSMENT
60 yo female with pmhx of chronic diverticulitis, type 2 DM, HTN, hypertriglyceridemia, chronic pancreatitis presents with complaint of diffuse abdominal pain admitted with acute pancreatitis. Now off insulin gtt. Plan to downgrade to GMF.      Problem/Plan - 1:  ·  Problem: Acute pancreatitis.   ·  Plan: -CT with Acute interstitial edematous pancreatitis likely 2/2 hypertriglyceridemia  patient reports multiple episodes in the past, all 2/2 hypertriglyceridemia  -Vishnu score on admission 3 (WBC>16, age >55, AST>250)- severe pancreatitis likely.   -triglycerides in am 3327->2001->721  -fenofibrate 145 mg oral daily  Advance diet  . If tolerating solid, will dc home today      Problem/Plan - 2:  ·  Problem: Hypertriglyceridemia.   ·  Plan: acute on chronic  -Plan to downgrade to F now that insulin gtt discontinued    - fenofibrate 145 mg oral daily.     Problem/Plan - 3:  ·  Problem: Type 2 diabetes mellitus.   ·  Plan: chronic, stable  -Hold oral hypoglycemic meds while in patient  Insulin sliding scale   Advancing diet   -Hemoglobin A1c 6.9.     Problem/Plan - 4:  ·  Problem: Anxiety.   ·  Plan: - chronic, stable  - continue home xanax prn.     Problem/Plan - 5:  ·  Problem: HTN (hypertension).   ·  Plan: -continue home atenolol with hold parameters  -may have to hold indapamide as a thiazide like med that could induce pancreatitis.    Atenolol      Problem/Plan - 6:  ·  Problem: Need for prophylactic measure.   ·  Plan: -lovenox 40mg qd for dvt ppx.    Disp: Start solid diet.  If tolerating may dc home today 2/27

## 2022-02-27 NOTE — PROVIDER CONTACT NOTE (MEDICATION) - ASSESSMENT
Pt is AOx4, states she always takes xanax at bedtime and has her own meds that she will take if she doesn't have meds.

## 2022-02-27 NOTE — PROGRESS NOTE ADULT - SUBJECTIVE AND OBJECTIVE BOX
CC: Pancreatitis secondary to hyperlipidemia, hypertriglyceridemia.   HPI:  62 yo female with pmhx of chronic diverticulitis, type 2 DM, HTN, hypertriglyceridemia, chronic pancreatitis presents with complaint of diffuse abdominal pain. Patient states that for the last two weeks she has had abdominal pain, nausea and diarrhea. Today she began having worsening pain which she thought may be related to her diverticulitis Patient states she had ct scan of abdomen showing mild acute diverticulitis at [facility] on 2/17. She completed an Augmentin course one week ago. Pt c/o of chills.  no urinary symptoms no chest pain no sob. Pt states abdomen chronically bloated.    In the ed  WBC 19.72, , , lipase 6548. Unable to complete lab work due to lipemia, sample sent to [facility] processing   CT abdomen/pelvis: Acute interstitial edematous pancreatitis  Given zosyn x1, 1000cc NS x1, morphine 4 mg x 2.    (23 Feb 2022 22:18)    REVIEW OF SYSTEMS:    Patient denied fever, chills, abdominal pain, nausea, vomiting, cough, shortness of breath, chest pain or palpitations    Vital Signs Last 24 Hrs  T(C): 36.5 (27 Feb 2022 05:32), Max: 36.9 (26 Feb 2022 14:00)  T(F): 97.7 (27 Feb 2022 05:32), Max: 98.4 (26 Feb 2022 14:00)  HR: 67 (27 Feb 2022 05:32) (59 - 81)  BP: 125/74 (27 Feb 2022 05:32) (121/67 - 189/87)  BP(mean): 95 (26 Feb 2022 16:00) (86 - 122)  RR: 18 (27 Feb 2022 05:32) (18 - 80)  SpO2: 96% (27 Feb 2022 06:21) (90% - 96%)I&O's Summary    26 Feb 2022 07:01  -  27 Feb 2022 07:00  --------------------------------------------------------  IN: 1380 mL / OUT: 0 mL / NET: 1380 mL      PHYSICAL EXAM:  GENERAL: NAD, well-groomed  HEENT: PERRL, +EOMI, anicteric, no [place]  NECK: Supple, No JVD   CHEST/LUNG: CTA bilaterally; Normal effort  HEART: S1S2 Normal intensity, no murmurs, gallops or rubs noted  ABDOMEN: Soft, BS Normoactive, NT, ND, no HSM noted  EXTREMITIES:  2+ radial and DP pulses noted, no clubbing, cyanosis, or edema noted, FROM x 4  SKIN: No rashes or lesions noted  NEURO: A&Ox3, no focal deficits noted, CN II-XII intact  PSYCH: normal mood and affect; insight/judgement appropriate  LABS:                        10.9   9.22  )-----------( 272      ( 27 Feb 2022 08:52 )             33.4     02-27    141  |  108  |  11  ----------------------------<  160<H>  3.6   |  27  |  0.50    Ca    9.2      27 Feb 2022 08:52  Phos  2.4     02-26  Mg     2.0     02-26    TPro  6.8  /  Alb  2.8<L>  /  TBili  0.4  /  DBili  x   /  AST  18  /  ALT  39  /  AlkPhos  79  02-27        RADIOLOGY & ADDITIONAL TESTS:    MEDICATIONS:  MEDICATIONS  (STANDING):  ATENolol  Tablet 25 milliGRAM(s) Oral daily  atorvastatin 40 milliGRAM(s) Oral at bedtime  dextrose 40% Gel 15 Gram(s) Oral once  dextrose 5%. 1000 milliLiter(s) (100 mL/Hr) IV Continuous <Continuous>  dextrose 5%. 1000 milliLiter(s) (50 mL/Hr) IV Continuous <Continuous>  dextrose 50% Injectable 25 Gram(s) IV Push once  dextrose 50% Injectable 12.5 Gram(s) IV Push once  dextrose 50% Injectable 25 Gram(s) IV Push once  dextrose 50% Injectable 50 milliLiter(s) IV Push every 15 minutes  dextrose 50% Injectable 25 milliLiter(s) IV Push every 15 minutes  enoxaparin Injectable 40 milliGRAM(s) SubCutaneous daily  fenofibrate Tablet 145 milliGRAM(s) Oral daily  glucagon  Injectable 1 milliGRAM(s) IntraMuscular once  insulin lispro (ADMELOG) corrective regimen sliding scale   SubCutaneous three times a day before meals  omega-3-fatty acids. 2 Capsule(s) Oral two times a day  pancrelipase  (CREON 12,000 Lipase Units) 4 Capsule(s) Oral three times a day with meals    MEDICATIONS  (PRN):  acetaminophen     Tablet .. 650 milliGRAM(s) Oral every 6 hours PRN Temp greater or equal to 38C (100.4F), Mild Pain (1 - 3)  ALPRAZolam 0.5 milliGRAM(s) Oral every 12 hours PRN anxiety  aluminum hydroxide/magnesium hydroxide/simethicone Suspension 30 milliLiter(s) Oral every 4 hours PRN Dyspepsia  ibuprofen  Tablet. 400 milliGRAM(s) Oral every 8 hours PRN Moderate Pain (4 - 6)  melatonin 3 milliGRAM(s) Oral at bedtime PRN Insomnia  ondansetron Injectable 4 milliGRAM(s) IV Push every 8 hours PRN Nausea and/or Vomiting

## 2022-02-27 NOTE — DISCHARGE NOTE NURSING/CASE MANAGEMENT/SOCIAL WORK - PATIENT PORTAL LINK FT
You can access the FollowMyHealth Patient Portal offered by [facility] by registering at the following website: [email or web address]. By joining [employer]’s FollowMyHealth portal, you will also be able to view your health information using other applications (apps) compatible with our system.

## 2022-02-27 NOTE — DISCHARGE NOTE NURSING/CASE MANAGEMENT/SOCIAL WORK - NSDCPEFALRISK_GEN_ALL_CORE
For information on Fall & Injury Prevention, visit: [email or web address] OR  [email or web address] OR  https://www.cdc.gov/steadi/patient.html

## 2022-02-27 NOTE — PROGRESS NOTE ADULT - PROVIDER SPECIALTY LIST ADULT
Critical Care
Critical Care
Hospitalist
Critical Care
Critical Care
Gastroenterology
Hospitalist

## 2022-03-01 ENCOUNTER — APPOINTMENT (OUTPATIENT)
Dept: INTERNAL MEDICINE | Facility: CLINIC | Age: 62
End: 2022-03-01
Payer: COMMERCIAL

## 2022-03-01 VITALS
OXYGEN SATURATION: 97 % | DIASTOLIC BLOOD PRESSURE: 90 MMHG | RESPIRATION RATE: 14 BRPM | WEIGHT: 164 LBS | BODY MASS INDEX: 26.36 KG/M2 | HEART RATE: 67 BPM | SYSTOLIC BLOOD PRESSURE: 150 MMHG | HEIGHT: 66 IN

## 2022-03-01 VITALS — DIASTOLIC BLOOD PRESSURE: 80 MMHG | SYSTOLIC BLOOD PRESSURE: 134 MMHG

## 2022-03-01 DIAGNOSIS — R60.0 LOCALIZED EDEMA: ICD-10-CM

## 2022-03-01 DIAGNOSIS — I10 ESSENTIAL (PRIMARY) HYPERTENSION: ICD-10-CM

## 2022-03-01 DIAGNOSIS — M10.9 GOUT, UNSPECIFIED: ICD-10-CM

## 2022-03-01 DIAGNOSIS — K85.81 OTHER ACUTE PANCREATITIS WITH UNINFECTED NECROSIS: ICD-10-CM

## 2022-03-01 PROCEDURE — 99496 TRANSJ CARE MGMT HIGH F2F 7D: CPT

## 2022-03-01 NOTE — HISTORY OF PRESENT ILLNESS
[Post-hospitalization from ___ Hospital] : Post-hospitalization from [unfilled] Hospital [Admitted on: ___] : The patient was admitted on [unfilled] [Discharged on ___] : discharged on [unfilled] [Discharge Summary] : discharge summary [FreeTextEntry2] : KIANNA TAFOYA is a 61 year old F who presents today for hospital follow up. Pt went to the hospital for acute abdominal pain and was diagnosed with pancreatitis. Pt triglyceride levels were over 3000. Pt was required ICU and was put on an insulin drip. Pt's amylase and lipase levels were elevated but improved upon discharge. Pt's triglyceride level was 637 upon discharge. Pt saw a gastroenterologist post-discharge. Pt complains of diarrhea and has R foot pain and calf pain, has concerns of gout.

## 2022-03-01 NOTE — PHYSICAL EXAM
[No Acute Distress] : no acute distress [Well Nourished] : well nourished [Well Developed] : well developed [Well-Appearing] : well-appearing [Normal Voice/Communication] : normal voice/communication [Normal Sclera/Conjunctiva] : normal sclera/conjunctiva [PERRL] : pupils equal round and reactive to light [EOMI] : extraocular movements intact [Normal Outer Ear/Nose] : the outer ears and nose were normal in appearance [No JVD] : no jugular venous distention [No Lymphadenopathy] : no lymphadenopathy [Supple] : supple [Thyroid Normal, No Nodules] : the thyroid was normal and there were no nodules present [No Respiratory Distress] : no respiratory distress  [No Accessory Muscle Use] : no accessory muscle use [Clear to Auscultation] : lungs were clear to auscultation bilaterally [Normal Rate] : normal rate  [Regular Rhythm] : with a regular rhythm [Normal S1, S2] : normal S1 and S2 [No Murmur] : no murmur heard [No Carotid Bruits] : no carotid bruits [No Abdominal Bruit] : a ~M bruit was not heard ~T in the abdomen [No Varicosities] : no varicosities [Pedal Pulses Present] : the pedal pulses are present [No Palpable Aorta] : no palpable aorta [No Extremity Clubbing/Cyanosis] : no extremity clubbing/cyanosis [Soft] : abdomen soft [Non Tender] : non-tender [Non-distended] : non-distended [No Masses] : no abdominal mass palpated [No HSM] : no HSM [Normal Bowel Sounds] : normal bowel sounds [Normal Supraclavicular Nodes] : no supraclavicular lymphadenopathy [Normal Posterior Cervical Nodes] : no posterior cervical lymphadenopathy [Normal Anterior Cervical Nodes] : no anterior cervical lymphadenopathy [No CVA Tenderness] : no CVA  tenderness [No Spinal Tenderness] : no spinal tenderness [No Joint Swelling] : no joint swelling [Grossly Normal Strength/Tone] : grossly normal strength/tone [No Rash] : no rash [Coordination Grossly Intact] : coordination grossly intact [No Focal Deficits] : no focal deficits [Normal Gait] : normal gait [Deep Tendon Reflexes (DTR)] : deep tendon reflexes were 2+ and symmetric [Speech Grossly Normal] : speech grossly normal [Memory Grossly Normal] : memory grossly normal [Normal Affect] : the affect was normal [Alert and Oriented x3] : oriented to person, place, and time [Normal Mood] : the mood was normal [Normal Insight/Judgement] : insight and judgment were intact [de-identified] : mild R calf swelling  [de-identified] : right great toe tenderness

## 2022-03-01 NOTE — END OF VISIT
[FreeTextEntry3] : "I, Bessie Pinto, personally scribed the services dictated to me by Dr. Darrin Edwards MD in this documentation on 03/01/2022 " \par \par "I Dr. Darrin Edwards MD, personally performed the services described in this documentation on 03/01/2022 for the patient as scribed by Bessie Pinto in my presence. I have reviewed and verified that all the information is accurate and true."\par

## 2022-03-01 NOTE — PLAN
[FreeTextEntry1] : Needs to see endocrinologist \par Follow up with GI\par Sent for Venous Doppler of Lower Extremities to R/O DVT\par decrease Metformin to 500 mg BID \par must follow diet\par to  see Endocrinologist regarding diabetes may benefit from Insulin \par use Tylenol for foot pain

## 2022-03-01 NOTE — HEALTH RISK ASSESSMENT
[Former] : Former [No] : In the past 12 months have you used drugs other than those required for medical reasons? No [No falls in past year] : Patient reported no falls in the past year [0] : 2) Feeling down, depressed, or hopeless: Not at all (0) [PHQ-2 Negative - No further assessment needed] : PHQ-2 Negative - No further assessment needed [[id number]] : 0

## 2022-03-02 LAB
25(OH)D3 SERPL-MCNC: 42.8 NG/ML
ALBUMIN SERPL ELPH-MCNC: 4.5 G/DL
ALP BLD-CCNC: 88 U/L
ALT SERPL-CCNC: 27 U/L
AMYLASE/CREAT SERPL: 83 U/L
ANION GAP SERPL CALC-SCNC: 18 MMOL/L
APPEARANCE: CLEAR
AST SERPL-CCNC: 17 U/L
BACTERIA: NEGATIVE
BASOPHILS # BLD AUTO: 0.1 K/UL
BASOPHILS NFR BLD AUTO: 1 %
BILIRUB SERPL-MCNC: 0.2 MG/DL
BILIRUBIN URINE: NEGATIVE
BLOOD URINE: NEGATIVE
BUN SERPL-MCNC: 25 MG/DL
CALCIUM SERPL-MCNC: 10 MG/DL
CHLORIDE SERPL-SCNC: 102 MMOL/L
CHOLEST SERPL-MCNC: 239 MG/DL
CK SERPL-CCNC: 36 U/L
CO2 SERPL-SCNC: 21 MMOL/L
COLOR: NORMAL
CREAT SERPL-MCNC: 0.66 MG/DL
EGFR: 100 ML/MIN/1.73M2
EOSINOPHIL # BLD AUTO: 0.38 K/UL
EOSINOPHIL NFR BLD AUTO: 3.7 %
ESTIMATED AVERAGE GLUCOSE: 146 MG/DL
GLUCOSE QUALITATIVE U: NEGATIVE
GLUCOSE SERPL-MCNC: 106 MG/DL
HBA1C MFR BLD HPLC: 6.7 %
HCT VFR BLD CALC: 41.2 %
HDLC SERPL-MCNC: 22 MG/DL
HGB BLD-MCNC: 12.7 G/DL
HYALINE CASTS: 0 /LPF
IMM GRANULOCYTES NFR BLD AUTO: 0.7 %
KETONES URINE: NEGATIVE
LDLC SERPL CALC-MCNC: NORMAL MG/DL
LEUKOCYTE ESTERASE URINE: NEGATIVE
LPL SERPL-CCNC: 106 U/L
LYMPHOCYTES # BLD AUTO: 2.59 K/UL
LYMPHOCYTES NFR BLD AUTO: 25.3 %
MAN DIFF?: NORMAL
MCHC RBC-ENTMCNC: 26.8 PG
MCHC RBC-ENTMCNC: 30.8 GM/DL
MCV RBC AUTO: 86.9 FL
MICROSCOPIC-UA: NORMAL
MONOCYTES # BLD AUTO: 0.8 K/UL
MONOCYTES NFR BLD AUTO: 7.8 %
NEUTROPHILS # BLD AUTO: 6.3 K/UL
NEUTROPHILS NFR BLD AUTO: 61.5 %
NITRITE URINE: NEGATIVE
NONHDLC SERPL-MCNC: 217 MG/DL
PH URINE: 7
PLATELET # BLD AUTO: 405 K/UL
POTASSIUM SERPL-SCNC: 4.3 MMOL/L
PROT SERPL-MCNC: 7.3 G/DL
PROTEIN URINE: NEGATIVE
RBC # BLD: 4.74 M/UL
RBC # FLD: 14.9 %
RED BLOOD CELLS URINE: 3 /HPF
SODIUM SERPL-SCNC: 141 MMOL/L
SPECIFIC GRAVITY URINE: 1.01
SQUAMOUS EPITHELIAL CELLS: 0 /HPF
TRIGL SERPL-MCNC: 484 MG/DL
TSH SERPL-ACNC: 2.27 UIU/ML
URATE SERPL-MCNC: 6.8 MG/DL
UROBILINOGEN URINE: NORMAL
WBC # FLD AUTO: 10.24 K/UL
WHITE BLOOD CELLS URINE: 0 /HPF

## 2022-03-21 NOTE — ED ADULT TRIAGE NOTE - NS ED NOTE AC HIGH RISK COUNTRIES
Advised patient to wear sunglasses to avoid progression. Use artificial tears as needed for comfort. No

## 2022-03-29 ENCOUNTER — APPOINTMENT (OUTPATIENT)
Dept: INTERNAL MEDICINE | Facility: CLINIC | Age: 62
End: 2022-03-29
Payer: COMMERCIAL

## 2022-03-29 ENCOUNTER — NON-APPOINTMENT (OUTPATIENT)
Age: 62
End: 2022-03-29

## 2022-03-29 VITALS
DIASTOLIC BLOOD PRESSURE: 82 MMHG | OXYGEN SATURATION: 98 % | BODY MASS INDEX: 26.36 KG/M2 | HEIGHT: 66 IN | HEART RATE: 88 BPM | TEMPERATURE: 97.9 F | WEIGHT: 164 LBS | SYSTOLIC BLOOD PRESSURE: 118 MMHG | RESPIRATION RATE: 16 BRPM

## 2022-03-29 PROCEDURE — 93000 ELECTROCARDIOGRAM COMPLETE: CPT

## 2022-03-29 PROCEDURE — 99396 PREV VISIT EST AGE 40-64: CPT | Mod: 25

## 2022-03-29 RX ORDER — AMOXICILLIN AND CLAVULANATE POTASSIUM 875; 125 MG/1; MG/1
875-125 TABLET, COATED ORAL
Qty: 14 | Refills: 0 | Status: DISCONTINUED | COMMUNITY
Start: 2021-08-05 | End: 2022-03-29

## 2022-03-29 NOTE — END OF VISIT
[FreeTextEntry3] : "I, Bessie Pinto, personally scribed the services dictated to me by Dr. Darrin Edwards MD in this documentation on 03/29/2022 " \par \par "I Dr. Darrin Edwards MD, personally performed the services described in this documentation on 03/29/2022 for the patient as scribed by Bessie Pinto in my presence. I have reviewed and verified that all the information is accurate and true."\par

## 2022-03-29 NOTE — HISTORY OF PRESENT ILLNESS
[FreeTextEntry1] : annual physical  [de-identified] : KIANNA TAFOYA is a 61 year old F who presents today for annual physical. Patient has no new complaints\par

## 2022-03-29 NOTE — HEALTH RISK ASSESSMENT
[Good] : ~his/her~  mood as  good [Former] : Former [No] : In the past 12 months have you used drugs other than those required for medical reasons? No [No falls in past year] : Patient reported no falls in the past year [0] : 2) Feeling down, depressed, or hopeless: Not at all (0) [PHQ-2 Negative - No further assessment needed] : PHQ-2 Negative - No further assessment needed [Patient reported mammogram was normal] : Patient reported mammogram was normal [[id number]] : 0 [MammogramDate] : 11/21

## 2022-03-30 LAB
25(OH)D3 SERPL-MCNC: 42.1 NG/ML
ALBUMIN SERPL ELPH-MCNC: 4.8 G/DL
ALP BLD-CCNC: 85 U/L
ALT SERPL-CCNC: 20 U/L
AMYLASE/CREAT SERPL: 44 U/L
ANION GAP SERPL CALC-SCNC: 14 MMOL/L
APPEARANCE: CLEAR
AST SERPL-CCNC: 16 U/L
BACTERIA: NEGATIVE
BASOPHILS # BLD AUTO: 0.09 K/UL
BASOPHILS NFR BLD AUTO: 0.8 %
BILIRUB SERPL-MCNC: 0.3 MG/DL
BILIRUBIN URINE: NEGATIVE
BLOOD URINE: NEGATIVE
BUN SERPL-MCNC: 17 MG/DL
CALCIUM SERPL-MCNC: 10.3 MG/DL
CHLORIDE SERPL-SCNC: 102 MMOL/L
CHOLEST SERPL-MCNC: 120 MG/DL
CK SERPL-CCNC: 68 U/L
CO2 SERPL-SCNC: 26 MMOL/L
COLOR: COLORLESS
CREAT SERPL-MCNC: 0.68 MG/DL
CREAT SPEC-SCNC: 22 MG/DL
EGFR: 100 ML/MIN/1.73M2
EOSINOPHIL # BLD AUTO: 0.15 K/UL
EOSINOPHIL NFR BLD AUTO: 1.3 %
ESTIMATED AVERAGE GLUCOSE: 140 MG/DL
GLUCOSE QUALITATIVE U: NEGATIVE
GLUCOSE SERPL-MCNC: 93 MG/DL
HBA1C MFR BLD HPLC: 6.5 %
HCT VFR BLD CALC: 40 %
HDLC SERPL-MCNC: 29 MG/DL
HGB BLD-MCNC: 12.1 G/DL
HYALINE CASTS: 0 /LPF
IMM GRANULOCYTES NFR BLD AUTO: 0.4 %
KETONES URINE: NEGATIVE
LDLC SERPL CALC-MCNC: 44 MG/DL
LEUKOCYTE ESTERASE URINE: NEGATIVE
LPL SERPL-CCNC: 33 U/L
LYMPHOCYTES # BLD AUTO: 2.42 K/UL
LYMPHOCYTES NFR BLD AUTO: 20.7 %
MAN DIFF?: NORMAL
MCHC RBC-ENTMCNC: 26.3 PG
MCHC RBC-ENTMCNC: 30.3 GM/DL
MCV RBC AUTO: 87 FL
MICROALBUMIN 24H UR DL<=1MG/L-MCNC: <1.2 MG/DL
MICROALBUMIN/CREAT 24H UR-RTO: NORMAL MG/G
MICROSCOPIC-UA: NORMAL
MONOCYTES # BLD AUTO: 0.81 K/UL
MONOCYTES NFR BLD AUTO: 6.9 %
NEUTROPHILS # BLD AUTO: 8.17 K/UL
NEUTROPHILS NFR BLD AUTO: 69.9 %
NITRITE URINE: NEGATIVE
NONHDLC SERPL-MCNC: 91 MG/DL
PH URINE: 5.5
PLATELET # BLD AUTO: 372 K/UL
POTASSIUM SERPL-SCNC: 4.5 MMOL/L
PROT SERPL-MCNC: 7.2 G/DL
PROTEIN URINE: NEGATIVE
RBC # BLD: 4.6 M/UL
RBC # FLD: 15.1 %
RED BLOOD CELLS URINE: 0 /HPF
SODIUM SERPL-SCNC: 142 MMOL/L
SPECIFIC GRAVITY URINE: 1
SQUAMOUS EPITHELIAL CELLS: 1 /HPF
TRIGL SERPL-MCNC: 237 MG/DL
TSH SERPL-ACNC: 1.17 UIU/ML
UROBILINOGEN URINE: NORMAL
WBC # FLD AUTO: 11.69 K/UL
WHITE BLOOD CELLS URINE: 0 /HPF

## 2022-04-03 ENCOUNTER — RX RENEWAL (OUTPATIENT)
Age: 62
End: 2022-04-03

## 2022-05-04 ENCOUNTER — APPOINTMENT (OUTPATIENT)
Dept: INTERNAL MEDICINE | Facility: CLINIC | Age: 62
End: 2022-05-04
Payer: COMMERCIAL

## 2022-05-04 VITALS
TEMPERATURE: 97.5 F | BODY MASS INDEX: 26.36 KG/M2 | DIASTOLIC BLOOD PRESSURE: 78 MMHG | WEIGHT: 164 LBS | SYSTOLIC BLOOD PRESSURE: 136 MMHG | HEART RATE: 66 BPM | RESPIRATION RATE: 16 BRPM | HEIGHT: 66 IN | OXYGEN SATURATION: 97 %

## 2022-05-04 DIAGNOSIS — J01.90 ACUTE SINUSITIS, UNSPECIFIED: ICD-10-CM

## 2022-05-04 PROCEDURE — 99214 OFFICE O/P EST MOD 30 MIN: CPT

## 2022-05-04 NOTE — HEALTH RISK ASSESSMENT
[Former] : Former [No] : In the past 12 months have you used drugs other than those required for medical reasons? No [No falls in past year] : Patient reported no falls in the past year [0] : 2) Feeling down, depressed, or hopeless: Not at all (0) [PHQ-2 Negative - No further assessment needed] : PHQ-2 Negative - No further assessment needed [[id number]] : 0

## 2022-05-04 NOTE — END OF VISIT
[FreeTextEntry3] : "I, Bessie Pinto, personally scribed the services dictated to me by Dr. Darrin Edwards MD in this documentation on 05/04/2022 " \par \par "I Dr. Darrin Edwards MD, personally performed the services described in this documentation on 05/04/2022 for the patient as scribed by Bessie Pinto in my presence. I have reviewed and verified that all the information is accurate and true."\par

## 2022-05-04 NOTE — HISTORY OF PRESENT ILLNESS
[Congestion] : congestion [Earache (L)] : pain in left ear [Earache (R)] : pain in right ear [Moderate] : moderate [___ Days ago] : [unfilled] days ago [Headache] : headache [Stable] : stable [Chills] : no chills [Fever] : no fever [de-identified] : watery eyes, nasal discharge [FreeTextEntry5] : Sudafed, Nasal Spray and Tylenol for headache [FreeTextEntry8] : KIANNA TAFOYA is a 61 year old F who presents today for an acute visit. Pt tested negative for COVID. Pt is here for follow up for HTN.

## 2022-05-04 NOTE — REVIEW OF SYSTEMS
[Nasal Discharge] : nasal discharge [Headache] : headache [Negative] : Heme/Lymph [Fever] : no fever [Chills] : no chills [FreeTextEntry3] : watery

## 2022-05-10 DIAGNOSIS — Z11.59 ENCOUNTER FOR SCREENING FOR OTHER VIRAL DISEASES: ICD-10-CM

## 2022-05-12 LAB — SARS-COV-2 N GENE NPH QL NAA+PROBE: DETECTED

## 2022-06-05 ENCOUNTER — RX RENEWAL (OUTPATIENT)
Age: 62
End: 2022-06-05

## 2022-06-13 ENCOUNTER — APPOINTMENT (OUTPATIENT)
Dept: INTERNAL MEDICINE | Facility: CLINIC | Age: 62
End: 2022-06-13
Payer: COMMERCIAL

## 2022-06-13 VITALS
HEART RATE: 73 BPM | WEIGHT: 164 LBS | HEIGHT: 66 IN | SYSTOLIC BLOOD PRESSURE: 132 MMHG | DIASTOLIC BLOOD PRESSURE: 76 MMHG | TEMPERATURE: 98.1 F | BODY MASS INDEX: 26.36 KG/M2 | RESPIRATION RATE: 14 BRPM | OXYGEN SATURATION: 98 %

## 2022-06-13 PROCEDURE — 99214 OFFICE O/P EST MOD 30 MIN: CPT

## 2022-06-13 RX ORDER — AZITHROMYCIN 250 MG/1
250 TABLET, FILM COATED ORAL
Qty: 1 | Refills: 0 | Status: DISCONTINUED | COMMUNITY
Start: 2022-05-04 | End: 2022-06-13

## 2022-06-13 NOTE — HEALTH RISK ASSESSMENT
[Former] : Former [No] : In the past 12 months have you used drugs other than those required for medical reasons? No [No falls in past year] : Patient reported no falls in the past year [0] : 2) Feeling down, depressed, or hopeless: Not at all (0) [PHQ-2 Negative - No further assessment needed] : PHQ-2 Negative - No further assessment needed [[id number]] : 0

## 2022-06-13 NOTE — HISTORY OF PRESENT ILLNESS
[FreeTextEntry1] : follow up  [de-identified] : KIANNA TAFOYA is a 61 year old F who presents today for follow up for cholesterol and DM. Patient has no new complaints\par

## 2022-06-13 NOTE — END OF VISIT
[FreeTextEntry3] : "I, Bessie Pinto, personally scribed the services dictated to me by Dr. Darrin Edwards MD in this documentation on 06/13/2022 " \par \par "I Dr. Darrin Edwards MD, personally performed the services described in this documentation on 06/13/2022 for the patient as scribed by Bessie Pinto in my presence. I have reviewed and verified that all the information is accurate and true."\par

## 2022-06-14 LAB
25(OH)D3 SERPL-MCNC: 38.2 NG/ML
ALBUMIN SERPL ELPH-MCNC: 4.8 G/DL
ALP BLD-CCNC: 92 U/L
ALT SERPL-CCNC: 18 U/L
AMYLASE/CREAT SERPL: 46 U/L
ANION GAP SERPL CALC-SCNC: 13 MMOL/L
AST SERPL-CCNC: 14 U/L
BASOPHILS # BLD AUTO: 0.09 K/UL
BASOPHILS NFR BLD AUTO: 0.8 %
BILIRUB SERPL-MCNC: 0.2 MG/DL
BUN SERPL-MCNC: 26 MG/DL
CALCIUM SERPL-MCNC: 10 MG/DL
CHLORIDE SERPL-SCNC: 104 MMOL/L
CHOLEST SERPL-MCNC: 125 MG/DL
CK SERPL-CCNC: 68 U/L
CO2 SERPL-SCNC: 26 MMOL/L
CREAT SERPL-MCNC: 0.65 MG/DL
EGFR: 101 ML/MIN/1.73M2
EOSINOPHIL # BLD AUTO: 0.26 K/UL
EOSINOPHIL NFR BLD AUTO: 2.4 %
ESTIMATED AVERAGE GLUCOSE: 143 MG/DL
GLUCOSE SERPL-MCNC: 140 MG/DL
HBA1C MFR BLD HPLC: 6.6 %
HCT VFR BLD CALC: 38.6 %
HDLC SERPL-MCNC: 29 MG/DL
HGB BLD-MCNC: 12.3 G/DL
IMM GRANULOCYTES NFR BLD AUTO: 0.5 %
LDLC SERPL CALC-MCNC: 52 MG/DL
LYMPHOCYTES # BLD AUTO: 2.32 K/UL
LYMPHOCYTES NFR BLD AUTO: 21.3 %
MAN DIFF?: NORMAL
MCHC RBC-ENTMCNC: 26.5 PG
MCHC RBC-ENTMCNC: 31.9 GM/DL
MCV RBC AUTO: 83 FL
MONOCYTES # BLD AUTO: 0.76 K/UL
MONOCYTES NFR BLD AUTO: 7 %
NEUTROPHILS # BLD AUTO: 7.43 K/UL
NEUTROPHILS NFR BLD AUTO: 68 %
NONHDLC SERPL-MCNC: 95 MG/DL
PLATELET # BLD AUTO: 347 K/UL
POTASSIUM SERPL-SCNC: 4.6 MMOL/L
PROT SERPL-MCNC: 7.2 G/DL
RBC # BLD: 4.65 M/UL
RBC # FLD: 14.5 %
SODIUM SERPL-SCNC: 143 MMOL/L
TRIGL SERPL-MCNC: 215 MG/DL
TSH SERPL-ACNC: 1.36 UIU/ML
WBC # FLD AUTO: 10.91 K/UL

## 2022-07-24 ENCOUNTER — RX RENEWAL (OUTPATIENT)
Age: 62
End: 2022-07-24

## 2022-08-24 ENCOUNTER — RX RENEWAL (OUTPATIENT)
Age: 62
End: 2022-08-24

## 2022-09-29 ENCOUNTER — RX RENEWAL (OUTPATIENT)
Age: 62
End: 2022-09-29

## 2022-10-17 ENCOUNTER — APPOINTMENT (OUTPATIENT)
Dept: INTERNAL MEDICINE | Facility: CLINIC | Age: 62
End: 2022-10-17

## 2022-10-17 VITALS
RESPIRATION RATE: 14 BRPM | OXYGEN SATURATION: 98 % | HEART RATE: 67 BPM | SYSTOLIC BLOOD PRESSURE: 126 MMHG | TEMPERATURE: 97.5 F | HEIGHT: 66 IN | DIASTOLIC BLOOD PRESSURE: 74 MMHG | WEIGHT: 170 LBS | BODY MASS INDEX: 27.32 KG/M2

## 2022-10-17 PROCEDURE — 99214 OFFICE O/P EST MOD 30 MIN: CPT

## 2022-10-17 RX ORDER — PIOGLITAZONE HYDROCHLORIDE 15 MG/1
15 TABLET ORAL
Qty: 90 | Refills: 1 | Status: DISCONTINUED | COMMUNITY
End: 2022-10-17

## 2022-10-17 RX ORDER — CONJUGATED ESTROGENS 0.62 MG/G
0.62 CREAM VAGINAL
Qty: 30 | Refills: 0 | Status: ACTIVE | COMMUNITY
Start: 2021-11-24

## 2022-10-17 NOTE — HEALTH RISK ASSESSMENT
[Former] : Former [No] : In the past 12 months have you used drugs other than those required for medical reasons? No [No falls in past year] : Patient reported no falls in the past year [0] : 2) Feeling down, depressed, or hopeless: Not at all (0) [PHQ-2 Negative - No further assessment needed] : PHQ-2 Negative - No further assessment needed [YearQuit] : 1990 [QBV1Qgwse] : 0

## 2022-10-17 NOTE — HISTORY OF PRESENT ILLNESS
[FreeTextEntry1] : follow up [de-identified] : KIANNA TAFOYA is a 62 year old F who presents today for DM cholesterol\par saw Endocrinologist\par has head congestion  eyes watery headache

## 2022-10-17 NOTE — PLAN
[FreeTextEntry1] : Continue medications\par start Antibiotic for Sinusitises \par Further instructions pending lab results

## 2022-10-18 LAB
ALBUMIN SERPL ELPH-MCNC: 4.6 G/DL
ALP BLD-CCNC: 85 U/L
ALT SERPL-CCNC: 19 U/L
ANION GAP SERPL CALC-SCNC: 15 MMOL/L
AST SERPL-CCNC: 12 U/L
BILIRUB SERPL-MCNC: 0.3 MG/DL
BUN SERPL-MCNC: 24 MG/DL
CALCIUM SERPL-MCNC: 10.3 MG/DL
CHLORIDE SERPL-SCNC: 104 MMOL/L
CHOLEST SERPL-MCNC: 118 MG/DL
CK SERPL-CCNC: 50 U/L
CO2 SERPL-SCNC: 26 MMOL/L
CREAT SERPL-MCNC: 0.6 MG/DL
EGFR: 102 ML/MIN/1.73M2
ESTIMATED AVERAGE GLUCOSE: 154 MG/DL
GLUCOSE SERPL-MCNC: 112 MG/DL
HBA1C MFR BLD HPLC: 7 %
HDLC SERPL-MCNC: 34 MG/DL
LDLC SERPL CALC-MCNC: 43 MG/DL
NONHDLC SERPL-MCNC: 84 MG/DL
POTASSIUM SERPL-SCNC: 4.6 MMOL/L
PROT SERPL-MCNC: 7.1 G/DL
SODIUM SERPL-SCNC: 144 MMOL/L
TRIGL SERPL-MCNC: 203 MG/DL

## 2022-10-31 RX ORDER — AMOXICILLIN AND CLAVULANATE POTASSIUM 875; 125 MG/1; MG/1
875-125 TABLET, COATED ORAL
Qty: 14 | Refills: 0 | Status: DISCONTINUED | COMMUNITY
Start: 2022-10-17 | End: 2022-10-31

## 2022-11-06 ENCOUNTER — RX RENEWAL (OUTPATIENT)
Age: 62
End: 2022-11-06

## 2022-11-11 ENCOUNTER — APPOINTMENT (OUTPATIENT)
Dept: ORTHOPEDIC SURGERY | Facility: CLINIC | Age: 62
End: 2022-11-11

## 2022-11-26 ENCOUNTER — RX RENEWAL (OUTPATIENT)
Age: 62
End: 2022-11-26

## 2022-12-24 ENCOUNTER — RX RENEWAL (OUTPATIENT)
Age: 62
End: 2022-12-24

## 2023-01-03 ENCOUNTER — NON-APPOINTMENT (OUTPATIENT)
Age: 63
End: 2023-01-03

## 2023-01-28 ENCOUNTER — RX RENEWAL (OUTPATIENT)
Age: 63
End: 2023-01-28

## 2023-02-14 ENCOUNTER — APPOINTMENT (OUTPATIENT)
Dept: INTERNAL MEDICINE | Facility: CLINIC | Age: 63
End: 2023-02-14
Payer: COMMERCIAL

## 2023-02-14 ENCOUNTER — NON-APPOINTMENT (OUTPATIENT)
Age: 63
End: 2023-02-14

## 2023-02-14 VITALS
DIASTOLIC BLOOD PRESSURE: 80 MMHG | RESPIRATION RATE: 14 BRPM | HEART RATE: 69 BPM | OXYGEN SATURATION: 98 % | HEIGHT: 66 IN | SYSTOLIC BLOOD PRESSURE: 126 MMHG | WEIGHT: 174 LBS | TEMPERATURE: 98.2 F | BODY MASS INDEX: 27.97 KG/M2

## 2023-02-14 PROCEDURE — 99396 PREV VISIT EST AGE 40-64: CPT | Mod: 25

## 2023-02-14 PROCEDURE — 93000 ELECTROCARDIOGRAM COMPLETE: CPT | Mod: 59

## 2023-02-14 RX ORDER — ICOSAPENT ETHYL 1 G/1
1 CAPSULE ORAL
Qty: 120 | Refills: 3 | Status: DISCONTINUED | COMMUNITY
Start: 2021-08-11 | End: 2023-02-14

## 2023-02-14 RX ORDER — ICOSAPENT ETHYL 1 G/1
1 CAPSULE ORAL TWICE DAILY
Qty: 120 | Refills: 1 | Status: DISCONTINUED | COMMUNITY
Start: 2021-09-15 | End: 2023-02-14

## 2023-02-14 NOTE — PLAN
[FreeTextEntry1] : Continue medications\par Further instructions pending lab results\par follow up Ophthalmologist

## 2023-02-14 NOTE — HISTORY OF PRESENT ILLNESS
[FreeTextEntry1] : Annual Physical [de-identified] : KIANNA TAFOYA is a 62 year old F who presents today for physical

## 2023-02-14 NOTE — HEALTH RISK ASSESSMENT
[Good] : ~his/her~  mood as  good [No] : In the past 12 months have you used drugs other than those required for medical reasons? No [No falls in past year] : Patient reported no falls in the past year [0] : 2) Feeling down, depressed, or hopeless: Not at all (0) [PHQ-2 Negative - No further assessment needed] : PHQ-2 Negative - No further assessment needed [Former] : Former [> 15 Years] : > 15 Years [[id number]] : 0

## 2023-02-14 NOTE — PHYSICAL EXAM
[No Acute Distress] : no acute distress [Well Nourished] : well nourished [Well Developed] : well developed [Well-Appearing] : well-appearing [Normal Voice/Communication] : normal voice/communication [Normal Sclera/Conjunctiva] : normal sclera/conjunctiva [PERRL] : pupils equal round and reactive to light [EOMI] : extraocular movements intact [Normal Outer Ear/Nose] : the outer ears and nose were normal in appearance [Normal Oropharynx] : the oropharynx was normal [Normal TMs] : both tympanic membranes were normal [No JVD] : no jugular venous distention [No Lymphadenopathy] : no lymphadenopathy [Supple] : supple [Thyroid Normal, No Nodules] : the thyroid was normal and there were no nodules present [No Respiratory Distress] : no respiratory distress  [No Accessory Muscle Use] : no accessory muscle use [Clear to Auscultation] : lungs were clear to auscultation bilaterally [Normal Rate] : normal rate  [Regular Rhythm] : with a regular rhythm [Normal S1, S2] : normal S1 and S2 [No Murmur] : no murmur heard [No Carotid Bruits] : no carotid bruits [No Abdominal Bruit] : a ~M bruit was not heard ~T in the abdomen [No Varicosities] : no varicosities [Pedal Pulses Present] : the pedal pulses are present [No Edema] : there was no peripheral edema [No Palpable Aorta] : no palpable aorta [No Extremity Clubbing/Cyanosis] : no extremity clubbing/cyanosis [Soft] : abdomen soft [Non Tender] : non-tender [Non-distended] : non-distended [No Masses] : no abdominal mass palpated [No HSM] : no HSM [Normal Bowel Sounds] : normal bowel sounds [Declined Rectal Exam] : declined rectal exam [Normal Supraclavicular Nodes] : no supraclavicular lymphadenopathy [Normal Axillary Nodes] : no axillary lymphadenopathy [Normal Posterior Cervical Nodes] : no posterior cervical lymphadenopathy [Normal Anterior Cervical Nodes] : no anterior cervical lymphadenopathy [No CVA Tenderness] : no CVA  tenderness [No Spinal Tenderness] : no spinal tenderness [No Joint Swelling] : no joint swelling [Grossly Normal Strength/Tone] : grossly normal strength/tone [No Rash] : no rash [Coordination Grossly Intact] : coordination grossly intact [No Focal Deficits] : no focal deficits [Normal Gait] : normal gait [Deep Tendon Reflexes (DTR)] : deep tendon reflexes were 2+ and symmetric [Speech Grossly Normal] : speech grossly normal [Memory Grossly Normal] : memory grossly normal [Normal Affect] : the affect was normal [Alert and Oriented x3] : oriented to person, place, and time [Normal Mood] : the mood was normal [Normal Insight/Judgement] : insight and judgment were intact

## 2023-02-15 LAB
ALBUMIN SERPL ELPH-MCNC: 4.6 G/DL
ALP BLD-CCNC: 90 U/L
ALT SERPL-CCNC: 15 U/L
ANION GAP SERPL CALC-SCNC: 14 MMOL/L
APPEARANCE: CLEAR
AST SERPL-CCNC: 11 U/L
BASOPHILS # BLD AUTO: 0.07 K/UL
BASOPHILS NFR BLD AUTO: 0.6 %
BILIRUB SERPL-MCNC: 0.3 MG/DL
BILIRUBIN URINE: NEGATIVE
BLOOD URINE: NEGATIVE
BUN SERPL-MCNC: 25 MG/DL
CALCIUM SERPL-MCNC: 10.3 MG/DL
CHLORIDE SERPL-SCNC: 101 MMOL/L
CHOLEST SERPL-MCNC: 122 MG/DL
CK SERPL-CCNC: 51 U/L
CO2 SERPL-SCNC: 25 MMOL/L
COLOR: YELLOW
CREAT SERPL-MCNC: 0.65 MG/DL
CREAT SPEC-SCNC: 89 MG/DL
EGFR: 100 ML/MIN/1.73M2
EOSINOPHIL # BLD AUTO: 0.15 K/UL
EOSINOPHIL NFR BLD AUTO: 1.3 %
ESTIMATED AVERAGE GLUCOSE: 154 MG/DL
GLUCOSE QUALITATIVE U: NEGATIVE
GLUCOSE SERPL-MCNC: 137 MG/DL
HBA1C MFR BLD HPLC: 7 %
HCT VFR BLD CALC: 39.7 %
HDLC SERPL-MCNC: 32 MG/DL
HGB BLD-MCNC: 12.1 G/DL
IMM GRANULOCYTES NFR BLD AUTO: 0.5 %
KETONES URINE: NEGATIVE
LDLC SERPL CALC-MCNC: 52 MG/DL
LEUKOCYTE ESTERASE URINE: NEGATIVE
LYMPHOCYTES # BLD AUTO: 2.26 K/UL
LYMPHOCYTES NFR BLD AUTO: 19.4 %
MAN DIFF?: NORMAL
MCHC RBC-ENTMCNC: 25.5 PG
MCHC RBC-ENTMCNC: 30.5 GM/DL
MCV RBC AUTO: 83.8 FL
MICROALBUMIN 24H UR DL<=1MG/L-MCNC: 4.4 MG/DL
MICROALBUMIN/CREAT 24H UR-RTO: 49 MG/G
MONOCYTES # BLD AUTO: 0.91 K/UL
MONOCYTES NFR BLD AUTO: 7.8 %
NEUTROPHILS # BLD AUTO: 8.22 K/UL
NEUTROPHILS NFR BLD AUTO: 70.4 %
NITRITE URINE: NEGATIVE
NONHDLC SERPL-MCNC: 90 MG/DL
PH URINE: 6
PLATELET # BLD AUTO: 339 K/UL
POTASSIUM SERPL-SCNC: 4.4 MMOL/L
PROT SERPL-MCNC: 7.1 G/DL
PROTEIN URINE: NORMAL
RBC # BLD: 4.74 M/UL
RBC # FLD: 15.9 %
SODIUM SERPL-SCNC: 140 MMOL/L
SPECIFIC GRAVITY URINE: 1.02
TRIGL SERPL-MCNC: 193 MG/DL
TSH SERPL-ACNC: 1.45 UIU/ML
UROBILINOGEN URINE: NORMAL
WBC # FLD AUTO: 11.67 K/UL

## 2023-02-15 RX ORDER — METFORMIN HYDROCHLORIDE 500 MG/1
500 TABLET, COATED ORAL
Qty: 180 | Refills: 1 | Status: ACTIVE | COMMUNITY
Start: 2020-08-06 | End: 1900-01-01

## 2023-02-17 LAB — HEMOCCULT STL QL IA: NEGATIVE

## 2023-02-17 RX ORDER — METRONIDAZOLE 7.5 MG/G
0.75 GEL VAGINAL
Qty: 70 | Refills: 0 | Status: DISCONTINUED | COMMUNITY
Start: 2023-01-09

## 2023-03-11 ENCOUNTER — APPOINTMENT (OUTPATIENT)
Dept: INTERNAL MEDICINE | Facility: CLINIC | Age: 63
End: 2023-03-11
Payer: COMMERCIAL

## 2023-03-11 PROCEDURE — 99442: CPT | Mod: 95

## 2023-03-11 NOTE — HISTORY OF PRESENT ILLNESS
[Congestion] : congestion [Moderate] : moderate [___ Days ago] : [unfilled] days ago [Episodic] : episodic  [Cough] : cough [Sore Throat] : sore throat [Headache] : headache [Stable] : stable [Wheezing] : no wheezing [Chills] : no chills [Anorexia] : no anorexia [Shortness Of Breath] : no shortness of breath [Earache] : no earache [Fatigue] : not fatigue [Fever] : no fever [FreeTextEntry8] : Two Way Video\par Verbal consent given\par KIANNA TAFOYA is a 62 year old F who presents today for cough congestion post nasal drip\par negative covid

## 2023-03-11 NOTE — HEALTH RISK ASSESSMENT
[No] : In the past 12 months have you used drugs other than those required for medical reasons? No [No falls in past year] : Patient reported no falls in the past year [0] : 2) Feeling down, depressed, or hopeless: Not at all (0) [PHQ-2 Negative - No further assessment needed] : PHQ-2 Negative - No further assessment needed [[id number]] : 0

## 2023-03-13 ENCOUNTER — APPOINTMENT (OUTPATIENT)
Dept: INTERNAL MEDICINE | Facility: CLINIC | Age: 63
End: 2023-03-13
Payer: COMMERCIAL

## 2023-03-13 VITALS
BODY MASS INDEX: 28.28 KG/M2 | HEIGHT: 66 IN | DIASTOLIC BLOOD PRESSURE: 84 MMHG | OXYGEN SATURATION: 98 % | RESPIRATION RATE: 14 BRPM | HEART RATE: 76 BPM | TEMPERATURE: 99 F | SYSTOLIC BLOOD PRESSURE: 126 MMHG | WEIGHT: 176 LBS

## 2023-03-13 DIAGNOSIS — J20.9 ACUTE BRONCHITIS, UNSPECIFIED: ICD-10-CM

## 2023-03-13 PROCEDURE — 99214 OFFICE O/P EST MOD 30 MIN: CPT

## 2023-03-13 RX ORDER — METRONIDAZOLE 500 MG/1
500 TABLET ORAL
Qty: 14 | Refills: 0 | Status: DISCONTINUED | COMMUNITY
Start: 2022-11-21

## 2023-03-13 NOTE — END OF VISIT
[FreeTextEntry3] : "I, Shirley Voss, personally scribed the services dictated to me by Dr. Darrin Edwards MD in this documentation on 03/13/2023 " \par \par "I Dr. Darrin Edwards MD, personally performed the services described in this documentation on 03/13/2023 for the patient as scribed by Shirley Voss in my presence. I have reviewed and verified that all the information is accurate and true."

## 2023-03-13 NOTE — REVIEW OF SYSTEMS
[Fever] : fever [Postnasal Drip] : postnasal drip [Negative] : Heme/Lymph [FreeTextEntry4] : chest congestion

## 2023-03-13 NOTE — PLAN
[FreeTextEntry1] : continue antibiotics \par sent for chest X-ray \par Advised to lose weight \par use Mucinex\par continue Flonase

## 2023-03-13 NOTE — HISTORY OF PRESENT ILLNESS
[Moderate] : moderate [___ Days ago] : [unfilled] days ago [Episodic] : episodic  [Congestion] : congestion [Cough] : cough [Fever] : fever [Stable] : stable [Spouse] : spouse [Sore Throat] : no sore throat [Wheezing] : no wheezing [Chills] : no chills [Anorexia] : no anorexia [Shortness Of Breath] : no shortness of breath [Earache] : no earache [Fatigue] : not fatigue [Headache] : no headache [FreeTextEntry8] : KIANNA TAFOYA is a 62 year old F who presents today for a cough, chest congestion, fever, body ache for 6 days.

## 2023-03-13 NOTE — HEALTH RISK ASSESSMENT
[No] : In the past 12 months have you used drugs other than those required for medical reasons? No [No falls in past year] : Patient reported no falls in the past year [0] : 2) Feeling down, depressed, or hopeless: Not at all (0) [PHQ-2 Negative - No further assessment needed] : PHQ-2 Negative - No further assessment needed [Former] : Former [> 15 Years] : > 15 Years [[id number]] : 0

## 2023-03-15 ENCOUNTER — TRANSCRIPTION ENCOUNTER (OUTPATIENT)
Age: 63
End: 2023-03-15

## 2023-03-17 ENCOUNTER — NON-APPOINTMENT (OUTPATIENT)
Age: 63
End: 2023-03-17

## 2023-03-17 RX ORDER — METHYLPREDNISOLONE 4 MG/1
4 TABLET ORAL
Qty: 1 | Refills: 0 | Status: ACTIVE | COMMUNITY
Start: 2023-03-17 | End: 1900-01-01

## 2023-03-27 DIAGNOSIS — M25.561 PAIN IN RIGHT KNEE: ICD-10-CM
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
## 2023-03-29 RX ORDER — DICLOFENAC SODIUM 100 MG/1
100 TABLET, FILM COATED, EXTENDED RELEASE ORAL
Qty: 30 | Refills: 0 | Status: ACTIVE | COMMUNITY
Start: 2022-04-25 | End: 1900-01-01

## 2023-04-10 ENCOUNTER — APPOINTMENT (OUTPATIENT)
Dept: ENDOCRINOLOGY | Facility: CLINIC | Age: 63
End: 2023-04-10

## 2023-05-03 ENCOUNTER — APPOINTMENT (OUTPATIENT)
Dept: CARDIOLOGY | Facility: CLINIC | Age: 63
End: 2023-05-03

## 2023-05-10 ENCOUNTER — RX RENEWAL (OUTPATIENT)
Age: 63
End: 2023-05-10

## 2023-06-04 ENCOUNTER — NON-APPOINTMENT (OUTPATIENT)
Age: 63
End: 2023-06-04

## 2023-06-05 ENCOUNTER — APPOINTMENT (OUTPATIENT)
Dept: INTERNAL MEDICINE | Facility: CLINIC | Age: 63
End: 2023-06-05
Payer: COMMERCIAL

## 2023-06-05 VITALS — DIASTOLIC BLOOD PRESSURE: 80 MMHG | SYSTOLIC BLOOD PRESSURE: 138 MMHG

## 2023-06-05 VITALS
BODY MASS INDEX: 28.12 KG/M2 | SYSTOLIC BLOOD PRESSURE: 150 MMHG | OXYGEN SATURATION: 97 % | RESPIRATION RATE: 14 BRPM | DIASTOLIC BLOOD PRESSURE: 76 MMHG | HEART RATE: 77 BPM | WEIGHT: 175 LBS | HEIGHT: 66 IN | TEMPERATURE: 97.9 F

## 2023-06-05 PROCEDURE — 99214 OFFICE O/P EST MOD 30 MIN: CPT

## 2023-06-05 NOTE — HISTORY OF PRESENT ILLNESS
[FreeTextEntry1] : follow up  [de-identified] : KIANNA TAFOYA is a 62 year old F who presents today for follow up for blood pressure, blood sugar, and cholesterol. Pt recently saw Endocrinologist. Patient has no new complaints.

## 2023-06-05 NOTE — HEALTH RISK ASSESSMENT
[Never (0 pts)] : Never (0 points) - - - [No] : In the past 12 months have you used drugs other than those required for medical reasons? No [No falls in past year] : Patient reported no falls in the past year [0] : 2) Feeling down, depressed, or hopeless: Not at all (0) [PHQ-2 Negative - No further assessment needed] : PHQ-2 Negative - No further assessment needed [Never] : Never [FGI3Evxyn] : 0

## 2023-06-05 NOTE — END OF VISIT
[FreeTextEntry3] : "I, Maximilian Weir, personally scribed the services dictated to me by Dr. Darrin Edwards MD in this documentation on 06/05/2023 " \par \par "I Dr. Darrin Edwards MD, personally performed the services described in this documentation on 06/05/2023 for the patient as scribed by Maximilian Weir in my presence. I have reviewed and verified that all the information is accurate and true."

## 2023-06-06 LAB
25(OH)D3 SERPL-MCNC: 33.8 NG/ML
ALBUMIN SERPL ELPH-MCNC: 4.4 G/DL
ALP BLD-CCNC: 92 U/L
ALT SERPL-CCNC: 16 U/L
ANION GAP SERPL CALC-SCNC: 12 MMOL/L
AST SERPL-CCNC: 12 U/L
BILIRUB SERPL-MCNC: 0.2 MG/DL
BUN SERPL-MCNC: 24 MG/DL
CALCIUM SERPL-MCNC: 9.9 MG/DL
CHLORIDE SERPL-SCNC: 105 MMOL/L
CHOLEST SERPL-MCNC: 102 MG/DL
CK SERPL-CCNC: 80 U/L
CO2 SERPL-SCNC: 26 MMOL/L
CREAT SERPL-MCNC: 0.63 MG/DL
EGFR: 101 ML/MIN/1.73M2
ESTIMATED AVERAGE GLUCOSE: 160 MG/DL
GLUCOSE SERPL-MCNC: 126 MG/DL
HBA1C MFR BLD HPLC: 7.2 %
HDLC SERPL-MCNC: 29 MG/DL
LDLC SERPL CALC-MCNC: 29 MG/DL
NONHDLC SERPL-MCNC: 73 MG/DL
POTASSIUM SERPL-SCNC: 4.5 MMOL/L
PROT SERPL-MCNC: 6.7 G/DL
SODIUM SERPL-SCNC: 143 MMOL/L
TRIGL SERPL-MCNC: 219 MG/DL
TSH SERPL-ACNC: 1.11 UIU/ML

## 2023-06-06 RX ORDER — METFORMIN ER 500 MG 500 MG/1
500 TABLET ORAL DAILY
Qty: 270 | Refills: 0 | Status: ACTIVE | COMMUNITY
Start: 2023-05-18

## 2023-06-07 ENCOUNTER — TRANSCRIPTION ENCOUNTER (OUTPATIENT)
Age: 63
End: 2023-06-07

## 2023-06-08 ENCOUNTER — TRANSCRIPTION ENCOUNTER (OUTPATIENT)
Age: 63
End: 2023-06-08

## 2023-08-05 ENCOUNTER — RX RENEWAL (OUTPATIENT)
Age: 63
End: 2023-08-05

## 2023-08-07 ENCOUNTER — RX RENEWAL (OUTPATIENT)
Age: 63
End: 2023-08-07

## 2023-08-10 ENCOUNTER — APPOINTMENT (OUTPATIENT)
Dept: DERMATOLOGY | Facility: CLINIC | Age: 63
End: 2023-08-10
Payer: COMMERCIAL

## 2023-08-10 DIAGNOSIS — R23.8 OTHER SKIN CHANGES: ICD-10-CM

## 2023-08-10 DIAGNOSIS — H02.79: ICD-10-CM

## 2023-08-10 PROCEDURE — 99204 OFFICE O/P NEW MOD 45 MIN: CPT

## 2023-08-10 RX ORDER — BIMATOPROST 0.3 MG/ML
0.03 SOLUTION/ DROPS OPHTHALMIC
Qty: 1 | Refills: 1 | Status: ACTIVE | COMMUNITY
Start: 2023-08-10 | End: 1900-01-01

## 2023-08-10 RX ORDER — CLINDAMYCIN PHOSPHATE 1 G/10ML
1 GEL TOPICAL TWICE DAILY
Qty: 1 | Refills: 0 | Status: ACTIVE | COMMUNITY
Start: 2023-08-10 | End: 1900-01-01

## 2023-08-10 NOTE — HISTORY OF PRESENT ILLNESS
[FreeTextEntry1] : bump on the R breast; eyebrow hair loss [de-identified] : 62F here for  1) bump on the R breast x 3 months. Was more red but now a blemish. Asymptomatic 2) Loss of eyebrow hair x years. No hx of thyroid disease. No hx of alopecia in the scalp

## 2023-08-10 NOTE — PHYSICAL EXAM
[Alert] : alert [Oriented x 3] : ~L oriented x 3 [Well Nourished] : well nourished [Conjunctiva Non-injected] : conjunctiva non-injected [No Visual Lymphadenopathy] : no visual  lymphadenopathy [No Clubbing] : no clubbing [No Edema] : no edema [No Bromhidrosis] : no bromhidrosis [No Chromhidrosis] : no chromhidrosis [FreeTextEntry3] : Hyperpigmented pink mobile papule on the R breast Madarosis of the eyebrows bilaterally

## 2023-08-18 ENCOUNTER — APPOINTMENT (OUTPATIENT)
Dept: INTERNAL MEDICINE | Facility: CLINIC | Age: 63
End: 2023-08-18
Payer: COMMERCIAL

## 2023-08-18 VITALS
WEIGHT: 175 LBS | TEMPERATURE: 98.7 F | HEIGHT: 66 IN | HEART RATE: 69 BPM | BODY MASS INDEX: 28.12 KG/M2 | DIASTOLIC BLOOD PRESSURE: 84 MMHG | SYSTOLIC BLOOD PRESSURE: 136 MMHG | RESPIRATION RATE: 14 BRPM | OXYGEN SATURATION: 96 %

## 2023-08-18 DIAGNOSIS — R25.2 CRAMP AND SPASM: ICD-10-CM

## 2023-08-18 PROCEDURE — 99214 OFFICE O/P EST MOD 30 MIN: CPT

## 2023-08-18 NOTE — END OF VISIT
[FreeTextEntry3] : "I, Maximilian Weir, personally scribed the services dictated to me by Dr. Darrin Edwards MD in this documentation on 08/18/2023 "   "I Dr. Darrin Edwards MD, personally performed the services described in this documentation on 08/18/2023 for the patient as scribed by Maximilian Weir in my presence. I have reviewed and verified that all the information is accurate and true."

## 2023-08-18 NOTE — HISTORY OF PRESENT ILLNESS
[FreeTextEntry1] : follow up  [de-identified] : KIANNA TAFOYA is a 62 year old F who presents today for follow up for blood pressure, blood sugar, and cholesterol. Pt complains of leg cramps. History of Pancreatitis

## 2023-08-18 NOTE — PLAN
[FreeTextEntry1] : continue medications for HTN, HLD and DM Further instructions pending lab results  Advised to lose weight

## 2023-08-18 NOTE — HEALTH RISK ASSESSMENT
[Never (0 pts)] : Never (0 points) [No] : In the past 12 months have you used drugs other than those required for medical reasons? No [No falls in past year] : Patient reported no falls in the past year [0] : 2) Feeling down, depressed, or hopeless: Not at all (0) [PHQ-2 Negative - No further assessment needed] : PHQ-2 Negative - No further assessment needed [Former] : Former [[id number]] : 0

## 2023-08-19 LAB
ALBUMIN SERPL ELPH-MCNC: 4.8 G/DL
ALP BLD-CCNC: 90 U/L
ALT SERPL-CCNC: 21 U/L
ANION GAP SERPL CALC-SCNC: 13 MMOL/L
AST SERPL-CCNC: 13 U/L
BILIRUB SERPL-MCNC: 0.2 MG/DL
BUN SERPL-MCNC: 21 MG/DL
CALCIUM SERPL-MCNC: 10.1 MG/DL
CHLORIDE SERPL-SCNC: 103 MMOL/L
CHOLEST SERPL-MCNC: 116 MG/DL
CK SERPL-CCNC: 52 U/L
CO2 SERPL-SCNC: 26 MMOL/L
CREAT SERPL-MCNC: 0.57 MG/DL
CREAT SPEC-SCNC: 37 MG/DL
EGFR: 103 ML/MIN/1.73M2
ESTIMATED AVERAGE GLUCOSE: 154 MG/DL
GLUCOSE SERPL-MCNC: 139 MG/DL
HBA1C MFR BLD HPLC: 7 %
HDLC SERPL-MCNC: 34 MG/DL
LDLC SERPL CALC-MCNC: 44 MG/DL
MICROALBUMIN 24H UR DL<=1MG/L-MCNC: 2.6 MG/DL
MICROALBUMIN/CREAT 24H UR-RTO: 70 MG/G
NONHDLC SERPL-MCNC: 82 MG/DL
POTASSIUM SERPL-SCNC: 4.3 MMOL/L
PROT SERPL-MCNC: 7.1 G/DL
SODIUM SERPL-SCNC: 141 MMOL/L
TRIGL SERPL-MCNC: 245 MG/DL
TSH SERPL-ACNC: 1.24 UIU/ML

## 2023-08-20 ENCOUNTER — RX RENEWAL (OUTPATIENT)
Age: 63
End: 2023-08-20

## 2023-08-24 ENCOUNTER — RX RENEWAL (OUTPATIENT)
Age: 63
End: 2023-08-24

## 2023-08-24 RX ORDER — PIOGLITAZONE HYDROCHLORIDE 30 MG/1
30 TABLET ORAL
Qty: 90 | Refills: 1 | Status: ACTIVE | COMMUNITY
Start: 2022-10-13 | End: 1900-01-01

## 2023-10-06 NOTE — ED ADULT TRIAGE NOTE - HEIGHT IN FEET
Spoke to patient and wife. Advised Dr Riggins tried to contact him last night regarding his critically elevated glucose and he recommended he go to the ED for fluids. . Patient reports that he had not taken his diabetes medications in a week but restarted them on yesterday.   Reports that he feels fine this morning and his blood sugar is 340 currently.   Dr Riggins advised patient needs a BMP this morning. Liliana has been advised and after much debate has decided To go to [facility] to have lab drawn. Patient will call back with fax number to have order sent to lab.    5

## 2023-10-26 ENCOUNTER — RX RENEWAL (OUTPATIENT)
Age: 63
End: 2023-10-26

## 2023-10-26 RX ORDER — FENOFIBRATE 160 MG/1
160 TABLET ORAL
Qty: 90 | Refills: 1 | Status: ACTIVE | COMMUNITY
Start: 2020-09-13 | End: 1900-01-01

## 2023-11-16 NOTE — PROGRESS NOTE ADULT - PROBLEM SELECTOR PLAN 3
Con fenofibrate , high tg
Con fenofibrate , high tg
no bm, +flatus  miralax ordered by primary team  increase bowel regimen, coalce/senna/glycerin suppos  monitor for bms
No

## 2023-11-17 ENCOUNTER — RX RENEWAL (OUTPATIENT)
Age: 63
End: 2023-11-17

## 2023-11-21 ENCOUNTER — APPOINTMENT (OUTPATIENT)
Dept: INTERNAL MEDICINE | Facility: CLINIC | Age: 63
End: 2023-11-21
Payer: COMMERCIAL

## 2023-11-21 VITALS
SYSTOLIC BLOOD PRESSURE: 138 MMHG | WEIGHT: 175 LBS | TEMPERATURE: 98 F | HEART RATE: 71 BPM | DIASTOLIC BLOOD PRESSURE: 82 MMHG | OXYGEN SATURATION: 96 % | BODY MASS INDEX: 28.12 KG/M2 | HEIGHT: 66 IN | RESPIRATION RATE: 14 BRPM

## 2023-11-21 DIAGNOSIS — J01.00 ACUTE MAXILLARY SINUSITIS, UNSPECIFIED: ICD-10-CM

## 2023-11-21 DIAGNOSIS — E78.5 HYPERLIPIDEMIA, UNSPECIFIED: ICD-10-CM

## 2023-11-21 PROCEDURE — 99214 OFFICE O/P EST MOD 30 MIN: CPT

## 2023-11-21 RX ORDER — AMOXICILLIN AND CLAVULANATE POTASSIUM 875; 125 MG/1; MG/1
875-125 TABLET, COATED ORAL
Qty: 14 | Refills: 0 | Status: ACTIVE | COMMUNITY
Start: 2022-05-09 | End: 1900-01-01

## 2023-11-25 LAB
ALBUMIN SERPL ELPH-MCNC: 4.4 G/DL
ALP BLD-CCNC: 87 U/L
ALT SERPL-CCNC: 20 U/L
ANION GAP SERPL CALC-SCNC: 12 MMOL/L
AST SERPL-CCNC: 15 U/L
BASOPHILS # BLD AUTO: 0.08 K/UL
BASOPHILS NFR BLD AUTO: 0.7 %
BILIRUB SERPL-MCNC: 0.2 MG/DL
BUN SERPL-MCNC: 20 MG/DL
CALCIUM SERPL-MCNC: 10.2 MG/DL
CHLORIDE SERPL-SCNC: 106 MMOL/L
CHOLEST SERPL-MCNC: 122 MG/DL
CK SERPL-CCNC: 34 U/L
CO2 SERPL-SCNC: 28 MMOL/L
CREAT SERPL-MCNC: 0.59 MG/DL
EGFR: 102 ML/MIN/1.73M2
EOSINOPHIL # BLD AUTO: 0.16 K/UL
EOSINOPHIL NFR BLD AUTO: 1.4 %
ESTIMATED AVERAGE GLUCOSE: 148 MG/DL
GLUCOSE SERPL-MCNC: 102 MG/DL
HBA1C MFR BLD HPLC: 6.8 %
HCT VFR BLD CALC: 38.9 %
HDLC SERPL-MCNC: 29 MG/DL
HGB BLD-MCNC: 11.8 G/DL
IMM GRANULOCYTES NFR BLD AUTO: 0.5 %
LDLC SERPL CALC-MCNC: 52 MG/DL
LYMPHOCYTES # BLD AUTO: 2.32 K/UL
LYMPHOCYTES NFR BLD AUTO: 20.1 %
MAN DIFF?: NORMAL
MCHC RBC-ENTMCNC: 25.1 PG
MCHC RBC-ENTMCNC: 30.3 GM/DL
MCV RBC AUTO: 82.6 FL
MONOCYTES # BLD AUTO: 0.97 K/UL
MONOCYTES NFR BLD AUTO: 8.4 %
NEUTROPHILS # BLD AUTO: 7.98 K/UL
NEUTROPHILS NFR BLD AUTO: 68.9 %
NONHDLC SERPL-MCNC: 92 MG/DL
PLATELET # BLD AUTO: 381 K/UL
POTASSIUM SERPL-SCNC: 4.4 MMOL/L
PROT SERPL-MCNC: 6.8 G/DL
RBC # BLD: 4.71 M/UL
RBC # FLD: 16.2 %
SODIUM SERPL-SCNC: 145 MMOL/L
TRIGL SERPL-MCNC: 251 MG/DL
WBC # FLD AUTO: 11.57 K/UL

## 2023-12-02 ENCOUNTER — NON-APPOINTMENT (OUTPATIENT)
Age: 63
End: 2023-12-02

## 2023-12-11 ENCOUNTER — RX RENEWAL (OUTPATIENT)
Age: 63
End: 2023-12-11

## 2023-12-17 NOTE — ED PROVIDER NOTE - TEMPLATE, MLM
General no abnormalities noted on exam but bf notes that she appears to be responding to internal stimuli sometimes

## 2024-01-04 NOTE — ED PROVIDER NOTE - IV ALTEPLASE EXCL ABS HIDDEN
Health Maintenance Due   Topic Date Due   • Hepatitis B Vaccine (1 of 3 - 3-dose series) Never done   • COVID-19 Vaccine (4 - Booster for Moderna series) 03/04/2022   • Influenza Vaccine (1) 09/01/2022   • Traditional Medicare- Medicare Wellness Visit  03/09/2023   • Depression Screening  03/09/2023       Patient is due for topics listed above, he wishes to proceed with Immunization(s) Influenza, but is not proceeding with Immunization(s) COVID-19 and Hep B at this time.   
Patient with URI symptoms including congestion, sore throat, ear pain and subjective fevers/chills.
show

## 2024-01-08 NOTE — PROVIDER CONTACT NOTE (CRITICAL VALUE NOTIFICATION) - DATE AND TIME:
I called mobile number on file to offer family behavior therapy. Left my call back number.    23-Feb-2022 21:42 23-Feb-2022 21:41

## 2024-01-18 ENCOUNTER — APPOINTMENT (OUTPATIENT)
Dept: DERMATOLOGY | Facility: CLINIC | Age: 64
End: 2024-01-18

## 2024-01-31 ENCOUNTER — RX RENEWAL (OUTPATIENT)
Age: 64
End: 2024-01-31

## 2024-03-04 ENCOUNTER — NON-APPOINTMENT (OUTPATIENT)
Age: 64
End: 2024-03-04

## 2024-03-04 ENCOUNTER — APPOINTMENT (OUTPATIENT)
Dept: INTERNAL MEDICINE | Facility: CLINIC | Age: 64
End: 2024-03-04
Payer: COMMERCIAL

## 2024-03-04 ENCOUNTER — LABORATORY RESULT (OUTPATIENT)
Age: 64
End: 2024-03-04

## 2024-03-04 VITALS
WEIGHT: 180 LBS | HEART RATE: 66 BPM | TEMPERATURE: 97.8 F | DIASTOLIC BLOOD PRESSURE: 64 MMHG | BODY MASS INDEX: 28.93 KG/M2 | RESPIRATION RATE: 14 BRPM | SYSTOLIC BLOOD PRESSURE: 102 MMHG | OXYGEN SATURATION: 96 % | HEIGHT: 66 IN

## 2024-03-04 DIAGNOSIS — I10 ESSENTIAL (PRIMARY) HYPERTENSION: ICD-10-CM

## 2024-03-04 DIAGNOSIS — K52.9 NONINFECTIVE GASTROENTERITIS AND COLITIS, UNSPECIFIED: ICD-10-CM

## 2024-03-04 DIAGNOSIS — Z00.00 ENCOUNTER FOR GENERAL ADULT MEDICAL EXAMINATION W/OUT ABNORMAL FINDINGS: ICD-10-CM

## 2024-03-04 DIAGNOSIS — E11.9 TYPE 2 DIABETES MELLITUS W/OUT COMPLICATIONS: ICD-10-CM

## 2024-03-04 DIAGNOSIS — K51.219 ULCERATIVE (CHRONIC) PROCTITIS WITH UNSPECIFIED COMPLICATIONS: ICD-10-CM

## 2024-03-04 PROCEDURE — 93000 ELECTROCARDIOGRAM COMPLETE: CPT

## 2024-03-04 PROCEDURE — 99396 PREV VISIT EST AGE 40-64: CPT

## 2024-03-04 NOTE — PLAN
[FreeTextEntry1] : continue medications Atorvastatin Metformin Atenolol  Further instructions pending lab results  Follow up with GI  chronic medical conditions HTN HLD DM

## 2024-03-04 NOTE — END OF VISIT
[FreeTextEntry3] : "I, Mamadou Javed, personally scribed the services dictated to me by Dr. Darrin Edwards MD in this documentation on 03/04/2024"   "I Dr. Darrin Edwards MD, personally performed the services described in this documentation on 03/04/2024 for the patient as scribed by Mamadou Javed in my presence. I have reviewed and verified that all the information is accurate and true."

## 2024-03-04 NOTE — COUNSELING
[Benefits of weight loss discussed] : Benefits of weight loss discussed [Potential consequences of obesity discussed] : Potential consequences of obesity discussed [Encouraged to increase physical activity] : Encouraged to increase physical activity [Decrease Portions] : decrease portions [Good understanding] : Patient has a good understanding of disease, goals and obesity follow-up plan

## 2024-03-04 NOTE — HISTORY OF PRESENT ILLNESS
[Spouse] : spouse [FreeTextEntry1] : annual physical  [de-identified] : KIANNA TAFOYA is a 63 year old F who presents today for her annual physical. Pt has a hx of DM, HTN and HLD. Pt has a hx of Pt last had a mammogram and pap test in 11/23. Pt recently had a colonoscopy done last week that revealed possible Crohn's or colitis. Pt complains of ringing in her ears. Pt is moving to [place] at the end of the month.

## 2024-03-04 NOTE — PHYSICAL EXAM
[No Acute Distress] : no acute distress [Well Nourished] : well nourished [Well-Appearing] : well-appearing [Well Developed] : well developed [Normal Voice/Communication] : normal voice/communication [Normal Sclera/Conjunctiva] : normal sclera/conjunctiva [PERRL] : pupils equal round and reactive to light [EOMI] : extraocular movements intact [Normal Oropharynx] : the oropharynx was normal [Normal Outer Ear/Nose] : the outer ears and nose were normal in appearance [Normal TMs] : both tympanic membranes were normal [No JVD] : no jugular venous distention [No Lymphadenopathy] : no lymphadenopathy [Supple] : supple [Thyroid Normal, No Nodules] : the thyroid was normal and there were no nodules present [No Respiratory Distress] : no respiratory distress  [No Accessory Muscle Use] : no accessory muscle use [Clear to Auscultation] : lungs were clear to auscultation bilaterally [Normal Rate] : normal rate  [Regular Rhythm] : with a regular rhythm [Normal S1, S2] : normal S1 and S2 [No Murmur] : no murmur heard [No Carotid Bruits] : no carotid bruits [No Abdominal Bruit] : a ~M bruit was not heard ~T in the abdomen [Pedal Pulses Present] : the pedal pulses are present [No Varicosities] : no varicosities [No Edema] : there was no peripheral edema [No Extremity Clubbing/Cyanosis] : no extremity clubbing/cyanosis [No Palpable Aorta] : no palpable aorta [Soft] : abdomen soft [Declined Breast Exam] : declined breast exam  [Non-distended] : non-distended [Non Tender] : non-tender [No Masses] : no abdominal mass palpated [No HSM] : no HSM [Normal Bowel Sounds] : normal bowel sounds [Declined Rectal Exam] : declined rectal exam [Normal Supraclavicular Nodes] : no supraclavicular lymphadenopathy [Normal Anterior Cervical Nodes] : no anterior cervical lymphadenopathy [Normal Posterior Cervical Nodes] : no posterior cervical lymphadenopathy [No CVA Tenderness] : no CVA  tenderness [No Joint Swelling] : no joint swelling [No Spinal Tenderness] : no spinal tenderness [Grossly Normal Strength/Tone] : grossly normal strength/tone [No Rash] : no rash [Coordination Grossly Intact] : coordination grossly intact [No Focal Deficits] : no focal deficits [Normal Gait] : normal gait [Deep Tendon Reflexes (DTR)] : deep tendon reflexes were 2+ and symmetric [Speech Grossly Normal] : speech grossly normal [Memory Grossly Normal] : memory grossly normal [Normal Affect] : the affect was normal [Alert and Oriented x3] : oriented to person, place, and time [Normal Mood] : the mood was normal [Normal Insight/Judgement] : insight and judgment were intact

## 2024-03-04 NOTE — HEALTH RISK ASSESSMENT
[Good] : ~his/her~  mood as  good [Never (0 pts)] : Never (0 points) [No] : In the past 12 months have you used drugs other than those required for medical reasons? No [No falls in past year] : Patient reported no falls in the past year [Little interest or pleasure doing things] : 1) Little interest or pleasure doing things [Feeling down, depressed, or hopeless] : 2) Feeling down, depressed, or hopeless [0] : 2) Feeling down, depressed, or hopeless: Not at all (0) [PHQ-2 Negative - No further assessment needed] : PHQ-2 Negative - No further assessment needed [Former] : Former [> 15 Years] : > 15 Years [[id number]] : 0

## 2024-03-07 LAB
25(OH)D3 SERPL-MCNC: 29.8 NG/ML
ALBUMIN SERPL ELPH-MCNC: 4.6 G/DL
ALP BLD-CCNC: 99 U/L
ALT SERPL-CCNC: 16 U/L
ANION GAP SERPL CALC-SCNC: 18 MMOL/L
APPEARANCE: CLEAR
AST SERPL-CCNC: 12 U/L
BACTERIA: NEGATIVE /HPF
BAKER'S YEAST AB QL: 5.7 UNITS
BAKER'S YEAST IGA QL IA: <5 UNITS
BAKER'S YEAST IGA QN IA: NEGATIVE
BAKER'S YEAST IGG QN IA: NEGATIVE
BASOPHILS # BLD AUTO: 0.08 K/UL
BASOPHILS NFR BLD AUTO: 0.7 %
BILIRUB SERPL-MCNC: 0.3 MG/DL
BILIRUBIN URINE: NEGATIVE
BLOOD URINE: NEGATIVE
BUN SERPL-MCNC: 21 MG/DL
CALCIUM SERPL-MCNC: 10.1 MG/DL
CAST: 0 /LPF
CHLORIDE SERPL-SCNC: 102 MMOL/L
CHOLEST SERPL-MCNC: 146 MG/DL
CK SERPL-CCNC: 41 U/L
CO2 SERPL-SCNC: 22 MMOL/L
COLOR: YELLOW
CREAT SERPL-MCNC: 0.49 MG/DL
CRP SERPL-MCNC: 13 MG/L
EGFR: 106 ML/MIN/1.73M2
EOSINOPHIL # BLD AUTO: 0.38 K/UL
EOSINOPHIL NFR BLD AUTO: 3.1 %
EPITHELIAL CELLS: 2 /HPF
ESTIMATED AVERAGE GLUCOSE: 151 MG/DL
GLUCOSE QUALITATIVE U: NEGATIVE MG/DL
GLUCOSE SERPL-MCNC: 96 MG/DL
HBA1C MFR BLD HPLC: 6.9 %
HCT VFR BLD CALC: 41.1 %
HDLC SERPL-MCNC: 34 MG/DL
HGB BLD-MCNC: 12.5 G/DL
IMM GRANULOCYTES NFR BLD AUTO: 0.5 %
KETONES URINE: NEGATIVE MG/DL
LDLC SERPL CALC-MCNC: 68 MG/DL
LEUKOCYTE ESTERASE URINE: NEGATIVE
LYMPHOCYTES # BLD AUTO: 2.11 K/UL
LYMPHOCYTES NFR BLD AUTO: 17.5 %
MAN DIFF?: NORMAL
MCHC RBC-ENTMCNC: 25.1 PG
MCHC RBC-ENTMCNC: 30.4 GM/DL
MCV RBC AUTO: 82.5 FL
MICROSCOPIC-UA: NORMAL
MONOCYTES # BLD AUTO: 0.96 K/UL
MONOCYTES NFR BLD AUTO: 8 %
NEUTROPHILS # BLD AUTO: 8.48 K/UL
NEUTROPHILS NFR BLD AUTO: 70.2 %
NITRITE URINE: NEGATIVE
NONHDLC SERPL-MCNC: 112 MG/DL
PH URINE: 6
PLATELET # BLD AUTO: 357 K/UL
POTASSIUM SERPL-SCNC: 4.6 MMOL/L
PROT SERPL-MCNC: 7.2 G/DL
PROTEIN URINE: NEGATIVE MG/DL
RBC # BLD: 4.98 M/UL
RBC # FLD: 17.2 %
RED BLOOD CELLS URINE: 0 /HPF
SODIUM SERPL-SCNC: 142 MMOL/L
SPECIFIC GRAVITY URINE: 1.02
TRIGL SERPL-MCNC: 272 MG/DL
TSH SERPL-ACNC: 1.13 UIU/ML
UROBILINOGEN URINE: 0.2 MG/DL
WBC # FLD AUTO: 12.07 K/UL
WHITE BLOOD CELLS URINE: 0 /HPF

## 2024-03-07 RX ORDER — ATORVASTATIN CALCIUM 20 MG/1
20 TABLET, FILM COATED ORAL
Qty: 90 | Refills: 1 | Status: ACTIVE | COMMUNITY
Start: 2021-08-24 | End: 1900-01-01

## 2024-03-08 ENCOUNTER — TRANSCRIPTION ENCOUNTER (OUTPATIENT)
Age: 64
End: 2024-03-08

## 2025-04-21 NOTE — ED ADULT TRIAGE NOTE - ACCOMPANIED BY
Medication:   Name from pharmacy: GLIPIZIDE ER 5MG TABLETS          Will file in chart as: glipiZIDE (GLUCOTROL XL) 5 MG 24 hr tablet    Sig: Take 1 tablet by mouth daily.    Original sig: TAKE 1 TABLET BY MOUTH DAILY    Disp: 90 tablet    Refills: 3 (Pharmacy requested: Not specified)    Start: 4/20/2025    Class: Eprescribe    Non-formulary For: Type 2 diabetes mellitus without complication, without long-term current use of insulin (CMD)    Last ordered: 1 year ago (4/17/2024) by Sunny Akbar MD    Last refill: 1/19/2025    Rx #: [id number]    Sulfonylurea Antihyperglycemics Refill Protocol - 12 Month Protocol [id number]/2025 03:06 AM   Protocol Details eGFR resulted within last 12 months -- IF CRITERIA FAILED REFER TO PROTOCOL DETAILS    eGFR greater than 29 within last 12 months looking at last value    Hgb A1c less than 8 within last 6 months looking at last value -- IF CRITERIA FAILED REFER TO PROTOCOL DETAILS    Seen by prescribing provider or same department within the last 12 months or has a future appt in 3 months - IF FAILED PLEASE LOOK AT CHART REVIEW FOR LAST VISIT AND PROCEED ACCORDINGLY    Lipid Panel resulted within last 15 months    Medication (including dose and sig) on current meds list      To be filled at: [id number] - [place], [place] - [address] [facility] [address]     Last office visit date: 4/17/24  Medication Refill Protocol Failed.  Failed criteria: labs. Sent to clinician to review.   
Immediate family member